# Patient Record
Sex: MALE | Race: WHITE | NOT HISPANIC OR LATINO
[De-identification: names, ages, dates, MRNs, and addresses within clinical notes are randomized per-mention and may not be internally consistent; named-entity substitution may affect disease eponyms.]

---

## 2017-01-09 ENCOUNTER — APPOINTMENT (OUTPATIENT)
Dept: SURGICAL ONCOLOGY | Facility: CLINIC | Age: 57
End: 2017-01-09

## 2017-01-09 VITALS
HEART RATE: 79 BPM | HEIGHT: 72 IN | DIASTOLIC BLOOD PRESSURE: 92 MMHG | WEIGHT: 200 LBS | SYSTOLIC BLOOD PRESSURE: 144 MMHG | BODY MASS INDEX: 27.09 KG/M2 | RESPIRATION RATE: 15 BRPM

## 2017-01-17 ENCOUNTER — APPOINTMENT (OUTPATIENT)
Dept: INTERNAL MEDICINE | Facility: CLINIC | Age: 57
End: 2017-01-17

## 2017-01-17 ENCOUNTER — NON-APPOINTMENT (OUTPATIENT)
Age: 57
End: 2017-01-17

## 2017-01-17 ENCOUNTER — FORM ENCOUNTER (OUTPATIENT)
Age: 57
End: 2017-01-17

## 2017-01-17 VITALS — DIASTOLIC BLOOD PRESSURE: 70 MMHG | SYSTOLIC BLOOD PRESSURE: 118 MMHG

## 2017-01-18 ENCOUNTER — OUTPATIENT (OUTPATIENT)
Dept: OUTPATIENT SERVICES | Facility: HOSPITAL | Age: 57
LOS: 1 days | End: 2017-01-18
Payer: MEDICAID

## 2017-01-18 ENCOUNTER — APPOINTMENT (OUTPATIENT)
Dept: ULTRASOUND IMAGING | Facility: CLINIC | Age: 57
End: 2017-01-18

## 2017-01-18 ENCOUNTER — RESULT REVIEW (OUTPATIENT)
Age: 57
End: 2017-01-18

## 2017-01-18 DIAGNOSIS — R19.00 INTRA-ABDOMINAL AND PELVIC SWELLING, MASS AND LUMP, UNSPECIFIED SITE: ICD-10-CM

## 2017-01-18 LAB
ALBUMIN SERPL ELPH-MCNC: 3.9 G/DL
ALP BLD-CCNC: 75 U/L
ALT SERPL-CCNC: 11 U/L
ANION GAP SERPL CALC-SCNC: 14 MMOL/L
AST SERPL-CCNC: 17 U/L
BASOPHILS # BLD AUTO: 0.04 K/UL
BASOPHILS NFR BLD AUTO: 0.5 %
BILIRUB SERPL-MCNC: 0.3 MG/DL
BUN SERPL-MCNC: 17 MG/DL
CALCIUM SERPL-MCNC: 9.7 MG/DL
CHLORIDE SERPL-SCNC: 101 MMOL/L
CO2 SERPL-SCNC: 26 MMOL/L
CREAT SERPL-MCNC: 1.02 MG/DL
EOSINOPHIL # BLD AUTO: 0.13 K/UL
EOSINOPHIL NFR BLD AUTO: 1.5 %
GLUCOSE SERPL-MCNC: 117 MG/DL
HCT VFR BLD CALC: 43.2 %
HGB BLD-MCNC: 14.3 G/DL
IMM GRANULOCYTES NFR BLD AUTO: 0.2 %
LYMPHOCYTES # BLD AUTO: 2.08 K/UL
LYMPHOCYTES NFR BLD AUTO: 24.2 %
MAN DIFF?: NORMAL
MCHC RBC-ENTMCNC: 31 PG
MCHC RBC-ENTMCNC: 33.1 GM/DL
MCV RBC AUTO: 93.5 FL
MONOCYTES # BLD AUTO: 0.65 K/UL
MONOCYTES NFR BLD AUTO: 7.5 %
NEUTROPHILS # BLD AUTO: 5.69 K/UL
NEUTROPHILS NFR BLD AUTO: 66.1 %
PLATELET # BLD AUTO: 367 K/UL
POTASSIUM SERPL-SCNC: 4.6 MMOL/L
PROT SERPL-MCNC: 6.3 G/DL
RBC # BLD: 4.62 M/UL
RBC # FLD: 12.7 %
SODIUM SERPL-SCNC: 141 MMOL/L
WBC # FLD AUTO: 8.61 K/UL

## 2017-01-18 PROCEDURE — 76705 ECHO EXAM OF ABDOMEN: CPT

## 2017-01-18 PROCEDURE — 76857 US EXAM PELVIC LIMITED: CPT

## 2017-01-23 ENCOUNTER — NON-APPOINTMENT (OUTPATIENT)
Age: 57
End: 2017-01-23

## 2017-02-02 ENCOUNTER — RX RENEWAL (OUTPATIENT)
Age: 57
End: 2017-02-02

## 2017-03-22 ENCOUNTER — APPOINTMENT (OUTPATIENT)
Dept: INTERNAL MEDICINE | Facility: CLINIC | Age: 57
End: 2017-03-22

## 2017-03-22 ENCOUNTER — OUTPATIENT (OUTPATIENT)
Dept: OUTPATIENT SERVICES | Facility: HOSPITAL | Age: 57
LOS: 1 days | End: 2017-03-22
Payer: MEDICAID

## 2017-03-22 VITALS — SYSTOLIC BLOOD PRESSURE: 130 MMHG | DIASTOLIC BLOOD PRESSURE: 80 MMHG

## 2017-03-22 VITALS
DIASTOLIC BLOOD PRESSURE: 97 MMHG | RESPIRATION RATE: 16 BRPM | TEMPERATURE: 98 F | SYSTOLIC BLOOD PRESSURE: 142 MMHG | WEIGHT: 203.05 LBS | HEIGHT: 71.5 IN

## 2017-03-22 DIAGNOSIS — K02.9 DENTAL CARIES, UNSPECIFIED: ICD-10-CM

## 2017-03-22 DIAGNOSIS — K63.5 POLYP OF COLON: ICD-10-CM

## 2017-03-22 DIAGNOSIS — R06.83 SNORING: ICD-10-CM

## 2017-03-22 DIAGNOSIS — N46.9 MALE INFERTILITY, UNSPECIFIED: Chronic | ICD-10-CM

## 2017-03-22 DIAGNOSIS — I10 ESSENTIAL (PRIMARY) HYPERTENSION: ICD-10-CM

## 2017-03-22 DIAGNOSIS — F10.129: ICD-10-CM

## 2017-03-22 DIAGNOSIS — F10.10 ALCOHOL ABUSE, UNCOMPLICATED: ICD-10-CM

## 2017-03-22 DIAGNOSIS — K63.5 POLYP OF COLON: Chronic | ICD-10-CM

## 2017-03-22 LAB
ALBUMIN SERPL ELPH-MCNC: 4.3 G/DL — SIGNIFICANT CHANGE UP (ref 3.3–5)
ALP SERPL-CCNC: 92 U/L — SIGNIFICANT CHANGE UP (ref 40–120)
ALT FLD-CCNC: 13 U/L — SIGNIFICANT CHANGE UP (ref 4–41)
APTT BLD: 32.2 SEC — SIGNIFICANT CHANGE UP (ref 27.5–37.4)
AST SERPL-CCNC: 17 U/L — SIGNIFICANT CHANGE UP (ref 4–40)
BASOPHILS # BLD AUTO: 0.05 K/UL — SIGNIFICANT CHANGE UP (ref 0–0.2)
BASOPHILS NFR BLD AUTO: 0.6 % — SIGNIFICANT CHANGE UP (ref 0–2)
BILIRUB DIRECT SERPL-MCNC: 0.1 MG/DL — SIGNIFICANT CHANGE UP (ref 0.1–0.2)
BILIRUB SERPL-MCNC: 0.5 MG/DL — SIGNIFICANT CHANGE UP (ref 0.2–1.2)
BLD GP AB SCN SERPL QL: NEGATIVE — SIGNIFICANT CHANGE UP
BUN SERPL-MCNC: 14 MG/DL — SIGNIFICANT CHANGE UP (ref 7–23)
CALCIUM SERPL-MCNC: 9.8 MG/DL — SIGNIFICANT CHANGE UP (ref 8.4–10.5)
CHLORIDE SERPL-SCNC: 103 MMOL/L — SIGNIFICANT CHANGE UP (ref 98–107)
CO2 SERPL-SCNC: 26 MMOL/L — SIGNIFICANT CHANGE UP (ref 22–31)
CREAT SERPL-MCNC: 1.14 MG/DL — SIGNIFICANT CHANGE UP (ref 0.5–1.3)
EOSINOPHIL # BLD AUTO: 0.13 K/UL — SIGNIFICANT CHANGE UP (ref 0–0.5)
EOSINOPHIL NFR BLD AUTO: 1.6 % — SIGNIFICANT CHANGE UP (ref 0–6)
GLUCOSE SERPL-MCNC: 102 MG/DL — HIGH (ref 70–99)
HCT VFR BLD CALC: 41.2 % — SIGNIFICANT CHANGE UP (ref 39–50)
HGB BLD-MCNC: 14.2 G/DL — SIGNIFICANT CHANGE UP (ref 13–17)
IMM GRANULOCYTES NFR BLD AUTO: 0.5 % — SIGNIFICANT CHANGE UP (ref 0–1.5)
INR BLD: 0.98 — SIGNIFICANT CHANGE UP (ref 0.88–1.17)
LYMPHOCYTES # BLD AUTO: 2.03 K/UL — SIGNIFICANT CHANGE UP (ref 1–3.3)
LYMPHOCYTES # BLD AUTO: 25 % — SIGNIFICANT CHANGE UP (ref 13–44)
MCHC RBC-ENTMCNC: 30.9 PG — SIGNIFICANT CHANGE UP (ref 27–34)
MCHC RBC-ENTMCNC: 34.5 % — SIGNIFICANT CHANGE UP (ref 32–36)
MCV RBC AUTO: 89.6 FL — SIGNIFICANT CHANGE UP (ref 80–100)
MONOCYTES # BLD AUTO: 0.7 K/UL — SIGNIFICANT CHANGE UP (ref 0–0.9)
MONOCYTES NFR BLD AUTO: 8.6 % — SIGNIFICANT CHANGE UP (ref 2–14)
NEUTROPHILS # BLD AUTO: 5.16 K/UL — SIGNIFICANT CHANGE UP (ref 1.8–7.4)
NEUTROPHILS NFR BLD AUTO: 63.7 % — SIGNIFICANT CHANGE UP (ref 43–77)
PLATELET # BLD AUTO: 311 K/UL — SIGNIFICANT CHANGE UP (ref 150–400)
PMV BLD: 10.6 FL — SIGNIFICANT CHANGE UP (ref 7–13)
POTASSIUM SERPL-MCNC: 4.3 MMOL/L — SIGNIFICANT CHANGE UP (ref 3.5–5.3)
POTASSIUM SERPL-SCNC: 4.3 MMOL/L — SIGNIFICANT CHANGE UP (ref 3.5–5.3)
PROT SERPL-MCNC: 6.7 G/DL — SIGNIFICANT CHANGE UP (ref 6–8.3)
PROTHROM AB SERPL-ACNC: 11 SEC — SIGNIFICANT CHANGE UP (ref 9.8–13.1)
RBC # BLD: 4.6 M/UL — SIGNIFICANT CHANGE UP (ref 4.2–5.8)
RBC # FLD: 13.3 % — SIGNIFICANT CHANGE UP (ref 10.3–14.5)
RH IG SCN BLD-IMP: POSITIVE — SIGNIFICANT CHANGE UP
SODIUM SERPL-SCNC: 141 MMOL/L — SIGNIFICANT CHANGE UP (ref 135–145)
WBC # BLD: 8.11 K/UL — SIGNIFICANT CHANGE UP (ref 3.8–10.5)
WBC # FLD AUTO: 8.11 K/UL — SIGNIFICANT CHANGE UP (ref 3.8–10.5)

## 2017-03-22 PROCEDURE — 93010 ELECTROCARDIOGRAM REPORT: CPT

## 2017-03-22 RX ORDER — SODIUM CHLORIDE 9 MG/ML
1000 INJECTION, SOLUTION INTRAVENOUS
Qty: 0 | Refills: 0 | Status: DISCONTINUED | OUTPATIENT
Start: 2017-03-28 | End: 2017-03-28

## 2017-03-22 NOTE — H&P PST ADULT - ATTENDING COMMENTS
Planned procedure including risks and benefits discussed with patient.      Past Medical History:  Alcohol abuse  relapse and last drink was 3-14-17. Had been sober for 168 days prior to drink on 3-14-17  Colon polyp  multiple  Hypertension  "labile" -- not on medication as of 30-22-17  Snoring  ANSON precautions -- responds affirmatively to STOP BANG questionnaire -- admits to loud snoring; age > 50; gender, male; elevated BP at PAST office.    Past Surgical History:  Colon polyp  colonoscopy -- negative biopsies in 2017  Infertility, male, severe  vascular procedure for infertility.

## 2017-03-22 NOTE — H&P PST ADULT - PROBLEM SELECTOR PLAN 1
This is a 57 y/o male who is scheduled for colonoscopy, lap robotic assisted extended right colectomy possible open on 3-28-17  * Given scrub cleanser This is a 55 y/o male who is scheduled for colonoscopy, lap robotic assisted extended right colectomy possible open on 3-28-17  * Given scrub cleanser  * Instructed to take normal am dose of disulfiram the am of surgery

## 2017-03-22 NOTE — H&P PST ADULT - LYMPHATIC
anterior cervical R/supraclavicular R/supraclavicular L/posterior cervical R/anterior cervical L/posterior cervical L

## 2017-03-22 NOTE — H&P PST ADULT - NSANTHOSAYNRD_GEN_A_CORE
No. ANSON screening performed.  STOP BANG Legend: 0-2 = LOW Risk; 3-4 = INTERMEDIATE Risk; 5-8 = HIGH Risk

## 2017-03-22 NOTE — H&P PST ADULT - PMH
Alcohol abuse  relapse and last drink was 3-14-17. Had been sober for 168 days prior to drink on 3-14-17  Colon polyp  multiple  Hypertension  "labile" -- not on medication as of 30-22-17  Snoring  ANSON precautions -- responds affirmatively to STOP BANG questionnaire -- admits to loud snoring; age > 50; gender, male; elevated BP at PAST office

## 2017-03-22 NOTE — H&P PST ADULT - HISTORY OF PRESENT ILLNESS
This is a 57 y/o male who presents with recent routine colonoscopy which revealed multiple polyps .Biopsies negative. Due to vast number, surgical intervention recommended. Scheduled for lap robotic assisted extended right colectomy, poss open on 3-28-17

## 2017-03-22 NOTE — H&P PST ADULT - PROBLEM SELECTOR PLAN 4
Await medical clearance from pcp due to elevated BP at PAST office  * Await old ekg for comparison  * Need to notify surgeon of pre op medical clearance Await medical clearance from pcp due to elevated BP at PAST office  * Await old ekg for comparison  * Need to notify surgeon of pre op medical clearance--notified Naomi at surgeon's service [phone]

## 2017-03-22 NOTE — H&P PST ADULT - PSH
Colon polyp  colonoscopy -- negative biopsies in 2017  Infertility, male, severe  vascular procedure for infertility

## 2017-03-22 NOTE — H&P PST ADULT - PROBLEM SELECTOR PLAN 5
Await dental clearance due to 4 loose bottom teeth  * Need to notify surgeon of pre op dental clearance Await dental clearance due to 4 loose bottom teeth  * Need to notify surgeon of pre op dental clearance--notified Naomi at surgeon's service [phone]

## 2017-03-28 ENCOUNTER — APPOINTMENT (OUTPATIENT)
Dept: SURGICAL ONCOLOGY | Facility: HOSPITAL | Age: 57
End: 2017-03-28

## 2017-03-28 ENCOUNTER — INPATIENT (INPATIENT)
Facility: HOSPITAL | Age: 57
LOS: 0 days | Discharge: HOME | End: 2017-03-28
Attending: SPECIALIST | Admitting: SPECIALIST

## 2017-03-28 VITALS
OXYGEN SATURATION: 97 % | HEIGHT: 71 IN | SYSTOLIC BLOOD PRESSURE: 126 MMHG | DIASTOLIC BLOOD PRESSURE: 91 MMHG | HEART RATE: 75 BPM | RESPIRATION RATE: 15 BRPM | WEIGHT: 203.05 LBS | TEMPERATURE: 98 F

## 2017-03-28 DIAGNOSIS — K63.5 POLYP OF COLON: Chronic | ICD-10-CM

## 2017-03-28 DIAGNOSIS — K63.5 POLYP OF COLON: ICD-10-CM

## 2017-03-28 DIAGNOSIS — N46.9 MALE INFERTILITY, UNSPECIFIED: Chronic | ICD-10-CM

## 2017-03-28 LAB — RH IG SCN BLD-IMP: POSITIVE — SIGNIFICANT CHANGE UP

## 2017-03-28 RX ORDER — HEPARIN SODIUM 5000 [USP'U]/ML
5000 INJECTION INTRAVENOUS; SUBCUTANEOUS ONCE
Qty: 0 | Refills: 0 | Status: COMPLETED | OUTPATIENT
Start: 2017-03-28 | End: 2017-03-28

## 2017-03-28 RX ADMIN — HEPARIN SODIUM 5000 UNIT(S): 5000 INJECTION INTRAVENOUS; SUBCUTANEOUS at 12:14

## 2017-03-28 NOTE — ASU PREOP CHECKLIST - TO WHOM
surgery cancelled by Dr. Cooley at 12:35 pm surgery cancelled by Dr. Cooley at 12:35 pm due to a family emergency

## 2017-04-06 ENCOUNTER — RESULT REVIEW (OUTPATIENT)
Age: 57
End: 2017-04-06

## 2017-04-07 ENCOUNTER — INPATIENT (INPATIENT)
Facility: HOSPITAL | Age: 57
LOS: 6 days | Discharge: HOME | DRG: 330 | End: 2017-04-14
Attending: SPECIALIST | Admitting: SPECIALIST
Payer: MEDICAID

## 2017-04-07 ENCOUNTER — APPOINTMENT (OUTPATIENT)
Dept: SURGICAL ONCOLOGY | Facility: HOSPITAL | Age: 57
End: 2017-04-07

## 2017-04-07 VITALS
OXYGEN SATURATION: 96 % | TEMPERATURE: 100 F | WEIGHT: 203.05 LBS | HEIGHT: 72 IN | HEART RATE: 94 BPM | SYSTOLIC BLOOD PRESSURE: 147 MMHG | DIASTOLIC BLOOD PRESSURE: 98 MMHG | RESPIRATION RATE: 18 BRPM

## 2017-04-07 DIAGNOSIS — K63.5 POLYP OF COLON: ICD-10-CM

## 2017-04-07 DIAGNOSIS — N46.9 MALE INFERTILITY, UNSPECIFIED: Chronic | ICD-10-CM

## 2017-04-07 DIAGNOSIS — K63.5 POLYP OF COLON: Chronic | ICD-10-CM

## 2017-04-07 LAB
ANION GAP SERPL CALC-SCNC: 13 MMOL/L — SIGNIFICANT CHANGE UP (ref 5–17)
BLD GP AB SCN SERPL QL: NEGATIVE — SIGNIFICANT CHANGE UP
BUN SERPL-MCNC: 8 MG/DL — SIGNIFICANT CHANGE UP (ref 7–23)
CALCIUM SERPL-MCNC: 8.3 MG/DL — LOW (ref 8.4–10.5)
CHLORIDE SERPL-SCNC: 103 MMOL/L — SIGNIFICANT CHANGE UP (ref 96–108)
CO2 SERPL-SCNC: 22 MMOL/L — SIGNIFICANT CHANGE UP (ref 22–31)
CREAT SERPL-MCNC: 1.36 MG/DL — HIGH (ref 0.5–1.3)
GLUCOSE SERPL-MCNC: 154 MG/DL — HIGH (ref 70–99)
HCT VFR BLD CALC: 41 % — SIGNIFICANT CHANGE UP (ref 39–50)
HGB BLD-MCNC: 13.8 G/DL — SIGNIFICANT CHANGE UP (ref 13–17)
MCHC RBC-ENTMCNC: 31.8 PG — SIGNIFICANT CHANGE UP (ref 27–34)
MCHC RBC-ENTMCNC: 33.7 GM/DL — SIGNIFICANT CHANGE UP (ref 32–36)
MCV RBC AUTO: 94.5 FL — SIGNIFICANT CHANGE UP (ref 80–100)
PLATELET # BLD AUTO: 288 K/UL — SIGNIFICANT CHANGE UP (ref 150–400)
POTASSIUM SERPL-MCNC: 4.3 MMOL/L — SIGNIFICANT CHANGE UP (ref 3.5–5.3)
POTASSIUM SERPL-SCNC: 4.3 MMOL/L — SIGNIFICANT CHANGE UP (ref 3.5–5.3)
RBC # BLD: 4.34 M/UL — SIGNIFICANT CHANGE UP (ref 4.2–5.8)
RBC # FLD: 12.4 % — SIGNIFICANT CHANGE UP (ref 10.3–14.5)
RH IG SCN BLD-IMP: POSITIVE — SIGNIFICANT CHANGE UP
RH IG SCN BLD-IMP: POSITIVE — SIGNIFICANT CHANGE UP
SODIUM SERPL-SCNC: 138 MMOL/L — SIGNIFICANT CHANGE UP (ref 135–145)
WBC # BLD: 13 K/UL — HIGH (ref 3.8–10.5)
WBC # FLD AUTO: 13 K/UL — HIGH (ref 3.8–10.5)

## 2017-04-07 PROCEDURE — 93010 ELECTROCARDIOGRAM REPORT: CPT

## 2017-04-07 PROCEDURE — 45381 COLONOSCOPY SUBMUCOUS NJX: CPT

## 2017-04-07 PROCEDURE — 44205 LAP COLECTOMY PART W/ILEUM: CPT

## 2017-04-07 PROCEDURE — 38570 LAPAROSCOPY LYMPH NODE BIOP: CPT

## 2017-04-07 RX ORDER — HEPARIN SODIUM 5000 [USP'U]/ML
5000 INJECTION INTRAVENOUS; SUBCUTANEOUS EVERY 8 HOURS
Qty: 0 | Refills: 0 | Status: DISCONTINUED | OUTPATIENT
Start: 2017-04-07 | End: 2017-04-07

## 2017-04-07 RX ORDER — NALOXONE HYDROCHLORIDE 4 MG/.1ML
0.1 SPRAY NASAL
Qty: 0 | Refills: 0 | Status: DISCONTINUED | OUTPATIENT
Start: 2017-04-07 | End: 2017-04-09

## 2017-04-07 RX ORDER — ACETAMINOPHEN 500 MG
1000 TABLET ORAL EVERY 6 HOURS
Qty: 0 | Refills: 0 | Status: COMPLETED | OUTPATIENT
Start: 2017-04-08 | End: 2017-04-08

## 2017-04-07 RX ORDER — HEPARIN SODIUM 5000 [USP'U]/ML
5000 INJECTION INTRAVENOUS; SUBCUTANEOUS ONCE
Qty: 0 | Refills: 0 | Status: COMPLETED | OUTPATIENT
Start: 2017-04-07 | End: 2017-04-07

## 2017-04-07 RX ORDER — METOPROLOL TARTRATE 50 MG
5 TABLET ORAL ONCE
Qty: 0 | Refills: 0 | Status: COMPLETED | OUTPATIENT
Start: 2017-04-07 | End: 2017-04-07

## 2017-04-07 RX ORDER — ENOXAPARIN SODIUM 100 MG/ML
40 INJECTION SUBCUTANEOUS DAILY
Qty: 0 | Refills: 0 | Status: DISCONTINUED | OUTPATIENT
Start: 2017-04-07 | End: 2017-04-14

## 2017-04-07 RX ORDER — ONDANSETRON 8 MG/1
4 TABLET, FILM COATED ORAL EVERY 6 HOURS
Qty: 0 | Refills: 0 | Status: DISCONTINUED | OUTPATIENT
Start: 2017-04-07 | End: 2017-04-09

## 2017-04-07 RX ORDER — HYDROMORPHONE HYDROCHLORIDE 2 MG/ML
30 INJECTION INTRAMUSCULAR; INTRAVENOUS; SUBCUTANEOUS
Qty: 0 | Refills: 0 | Status: DISCONTINUED | OUTPATIENT
Start: 2017-04-07 | End: 2017-04-09

## 2017-04-07 RX ORDER — LABETALOL HCL 100 MG
10 TABLET ORAL ONCE
Qty: 0 | Refills: 0 | Status: COMPLETED | OUTPATIENT
Start: 2017-04-07 | End: 2017-04-07

## 2017-04-07 RX ORDER — ALVIMOPAN 12 MG/1
12 CAPSULE ORAL ONCE
Qty: 0 | Refills: 0 | Status: COMPLETED | OUTPATIENT
Start: 2017-04-07 | End: 2017-04-07

## 2017-04-07 RX ORDER — SODIUM CHLORIDE 9 MG/ML
1000 INJECTION, SOLUTION INTRAVENOUS
Qty: 0 | Refills: 0 | Status: DISCONTINUED | OUTPATIENT
Start: 2017-04-07 | End: 2017-04-09

## 2017-04-07 RX ORDER — METOPROLOL TARTRATE 50 MG
5 TABLET ORAL EVERY 8 HOURS
Qty: 0 | Refills: 0 | Status: COMPLETED | OUTPATIENT
Start: 2017-04-07 | End: 2017-04-07

## 2017-04-07 RX ORDER — ONDANSETRON 8 MG/1
4 TABLET, FILM COATED ORAL
Qty: 0 | Refills: 0 | Status: DISCONTINUED | OUTPATIENT
Start: 2017-04-07 | End: 2017-04-07

## 2017-04-07 RX ORDER — HYDROMORPHONE HYDROCHLORIDE 2 MG/ML
1 INJECTION INTRAMUSCULAR; INTRAVENOUS; SUBCUTANEOUS
Qty: 0 | Refills: 0 | Status: DISCONTINUED | OUTPATIENT
Start: 2017-04-07 | End: 2017-04-07

## 2017-04-07 RX ORDER — ACETAMINOPHEN 500 MG
1000 TABLET ORAL ONCE
Qty: 0 | Refills: 0 | Status: COMPLETED | OUTPATIENT
Start: 2017-04-07 | End: 2017-04-07

## 2017-04-07 RX ORDER — ACETAMINOPHEN 500 MG
1000 TABLET ORAL EVERY 6 HOURS
Qty: 0 | Refills: 0 | Status: COMPLETED | OUTPATIENT
Start: 2017-04-07 | End: 2017-04-08

## 2017-04-07 RX ORDER — HYDROMORPHONE HYDROCHLORIDE 2 MG/ML
0.5 INJECTION INTRAMUSCULAR; INTRAVENOUS; SUBCUTANEOUS
Qty: 0 | Refills: 0 | Status: DISCONTINUED | OUTPATIENT
Start: 2017-04-07 | End: 2017-04-09

## 2017-04-07 RX ORDER — CEFOTETAN DISODIUM 1 G
2 VIAL (EA) INJECTION ONCE
Qty: 0 | Refills: 0 | Status: DISCONTINUED | OUTPATIENT
Start: 2017-04-07 | End: 2017-04-07

## 2017-04-07 RX ORDER — ALVIMOPAN 12 MG/1
12 CAPSULE ORAL
Qty: 0 | Refills: 0 | Status: DISCONTINUED | OUTPATIENT
Start: 2017-04-07 | End: 2017-04-10

## 2017-04-07 RX ADMIN — ALVIMOPAN 12 MILLIGRAM(S): 12 CAPSULE ORAL at 18:16

## 2017-04-07 RX ADMIN — HYDROMORPHONE HYDROCHLORIDE 1 MILLIGRAM(S): 2 INJECTION INTRAMUSCULAR; INTRAVENOUS; SUBCUTANEOUS at 13:40

## 2017-04-07 RX ADMIN — HYDROMORPHONE HYDROCHLORIDE 1 MILLIGRAM(S): 2 INJECTION INTRAMUSCULAR; INTRAVENOUS; SUBCUTANEOUS at 15:59

## 2017-04-07 RX ADMIN — Medication 400 MILLIGRAM(S): at 18:54

## 2017-04-07 RX ADMIN — SODIUM CHLORIDE 125 MILLILITER(S): 9 INJECTION, SOLUTION INTRAVENOUS at 15:34

## 2017-04-07 RX ADMIN — HYDROMORPHONE HYDROCHLORIDE 1 MILLIGRAM(S): 2 INJECTION INTRAMUSCULAR; INTRAVENOUS; SUBCUTANEOUS at 15:34

## 2017-04-07 RX ADMIN — Medication 110 MILLIGRAM(S): at 15:59

## 2017-04-07 RX ADMIN — HYDROMORPHONE HYDROCHLORIDE 1 MILLIGRAM(S): 2 INJECTION INTRAMUSCULAR; INTRAVENOUS; SUBCUTANEOUS at 14:00

## 2017-04-07 RX ADMIN — HYDROMORPHONE HYDROCHLORIDE 1 MILLIGRAM(S): 2 INJECTION INTRAMUSCULAR; INTRAVENOUS; SUBCUTANEOUS at 14:20

## 2017-04-07 RX ADMIN — ALVIMOPAN 12 MILLIGRAM(S): 12 CAPSULE ORAL at 08:30

## 2017-04-07 RX ADMIN — Medication 5 MILLIGRAM(S): at 18:04

## 2017-04-07 RX ADMIN — HYDROMORPHONE HYDROCHLORIDE 30 MILLILITER(S): 2 INJECTION INTRAMUSCULAR; INTRAVENOUS; SUBCUTANEOUS at 16:21

## 2017-04-07 RX ADMIN — HEPARIN SODIUM 5000 UNIT(S): 5000 INJECTION INTRAVENOUS; SUBCUTANEOUS at 08:30

## 2017-04-07 RX ADMIN — HYDROMORPHONE HYDROCHLORIDE 1 MILLIGRAM(S): 2 INJECTION INTRAMUSCULAR; INTRAVENOUS; SUBCUTANEOUS at 14:25

## 2017-04-07 RX ADMIN — HYDROMORPHONE HYDROCHLORIDE 0.5 MILLIGRAM(S): 2 INJECTION INTRAMUSCULAR; INTRAVENOUS; SUBCUTANEOUS at 17:16

## 2017-04-07 RX ADMIN — Medication 10 MILLIGRAM(S): at 15:00

## 2017-04-07 NOTE — H&P ADULT. - ASSESSMENT
55y/o male for robotic Right hemicolectomy for colonic dysplasia    - Patient for OR today; PST done previously at [facility], now scheduled for [facility]

## 2017-04-07 NOTE — H&P ADULT. - HISTORY OF PRESENT ILLNESS
This is a 57 y/o male who presents with recent routine colonoscopy which revealed multiple polyps .Biopsies negative. Due to vast number, surgical intervention recommended. Scheduled for lap robotic assisted extended right colectomy, poss open on 3-28-17. Patient initially scheduled at [facility]; rescheduled for surgery at [facility].

## 2017-04-07 NOTE — BRIEF OPERATIVE NOTE - OPERATION/FINDINGS
Robotic Extended Right tracie-colectomy for colonic dysplasia    Robot docked; cecum & Right ureter identified. Right colon mobilized from cecum to mid-transverse colon. Appropriate proximal & distal margins for resection identified, mesocolon then divided with vessel sealer. Intra-corporeal robotic resection & ileo-colonic hand sewn anastomosis performed. The robot was then undocked & specimen was removed through a small extended midline incision. Port sites closed; midline wound closed with staples & yon left in place. Patient brought to PACU extubated with bloom in place

## 2017-04-08 LAB
ANION GAP SERPL CALC-SCNC: 12 MMOL/L — SIGNIFICANT CHANGE UP (ref 5–17)
ANION GAP SERPL CALC-SCNC: 12 MMOL/L — SIGNIFICANT CHANGE UP (ref 5–17)
BASOPHILS # BLD AUTO: 0.01 K/UL — SIGNIFICANT CHANGE UP (ref 0–0.2)
BASOPHILS NFR BLD AUTO: 0.2 % — SIGNIFICANT CHANGE UP (ref 0–2)
BUN SERPL-MCNC: 9 MG/DL — SIGNIFICANT CHANGE UP (ref 7–23)
BUN SERPL-MCNC: 9 MG/DL — SIGNIFICANT CHANGE UP (ref 7–23)
CALCIUM SERPL-MCNC: 8.4 MG/DL — SIGNIFICANT CHANGE UP (ref 8.4–10.5)
CALCIUM SERPL-MCNC: 8.5 MG/DL — SIGNIFICANT CHANGE UP (ref 8.4–10.5)
CHLORIDE SERPL-SCNC: 96 MMOL/L — SIGNIFICANT CHANGE UP (ref 96–108)
CHLORIDE SERPL-SCNC: 97 MMOL/L — SIGNIFICANT CHANGE UP (ref 96–108)
CO2 SERPL-SCNC: 24 MMOL/L — SIGNIFICANT CHANGE UP (ref 22–31)
CO2 SERPL-SCNC: 24 MMOL/L — SIGNIFICANT CHANGE UP (ref 22–31)
CREAT SERPL-MCNC: 1.11 MG/DL — SIGNIFICANT CHANGE UP (ref 0.5–1.3)
CREAT SERPL-MCNC: 1.14 MG/DL — SIGNIFICANT CHANGE UP (ref 0.5–1.3)
EOSINOPHIL # BLD AUTO: 0 K/UL — SIGNIFICANT CHANGE UP (ref 0–0.5)
EOSINOPHIL NFR BLD AUTO: 0 % — SIGNIFICANT CHANGE UP (ref 0–6)
GAS PNL BLDA: SIGNIFICANT CHANGE UP
GLUCOSE SERPL-MCNC: 116 MG/DL — HIGH (ref 70–99)
GLUCOSE SERPL-MCNC: 116 MG/DL — HIGH (ref 70–99)
HCT VFR BLD CALC: 38.3 % — LOW (ref 39–50)
HGB BLD-MCNC: 13 G/DL — SIGNIFICANT CHANGE UP (ref 13–17)
IMM GRANULOCYTES NFR BLD AUTO: 0.2 % — SIGNIFICANT CHANGE UP (ref 0–1.5)
LYMPHOCYTES # BLD AUTO: 0.76 K/UL — LOW (ref 1–3.3)
LYMPHOCYTES # BLD AUTO: 12 % — LOW (ref 13–44)
MAGNESIUM SERPL-MCNC: 1.7 MG/DL — SIGNIFICANT CHANGE UP (ref 1.6–2.6)
MCHC RBC-ENTMCNC: 31.3 PG — SIGNIFICANT CHANGE UP (ref 27–34)
MCHC RBC-ENTMCNC: 33.9 GM/DL — SIGNIFICANT CHANGE UP (ref 32–36)
MCV RBC AUTO: 92.1 FL — SIGNIFICANT CHANGE UP (ref 80–100)
MONOCYTES # BLD AUTO: 0.4 K/UL — SIGNIFICANT CHANGE UP (ref 0–0.9)
MONOCYTES NFR BLD AUTO: 6.3 % — SIGNIFICANT CHANGE UP (ref 2–14)
NEUTROPHILS # BLD AUTO: 5.14 K/UL — SIGNIFICANT CHANGE UP (ref 1.8–7.4)
NEUTROPHILS NFR BLD AUTO: 81.3 % — HIGH (ref 43–77)
PHOSPHATE SERPL-MCNC: 3.8 MG/DL — SIGNIFICANT CHANGE UP (ref 2.5–4.5)
PLATELET # BLD AUTO: 266 K/UL — SIGNIFICANT CHANGE UP (ref 150–400)
POTASSIUM SERPL-MCNC: 4.4 MMOL/L — SIGNIFICANT CHANGE UP (ref 3.5–5.3)
POTASSIUM SERPL-MCNC: 4.4 MMOL/L — SIGNIFICANT CHANGE UP (ref 3.5–5.3)
POTASSIUM SERPL-SCNC: 4.4 MMOL/L — SIGNIFICANT CHANGE UP (ref 3.5–5.3)
POTASSIUM SERPL-SCNC: 4.4 MMOL/L — SIGNIFICANT CHANGE UP (ref 3.5–5.3)
RBC # BLD: 4.16 M/UL — LOW (ref 4.2–5.8)
RBC # FLD: 14 % — SIGNIFICANT CHANGE UP (ref 10.3–14.5)
SODIUM SERPL-SCNC: 132 MMOL/L — LOW (ref 135–145)
SODIUM SERPL-SCNC: 133 MMOL/L — LOW (ref 135–145)
WBC # BLD: 6.32 K/UL — SIGNIFICANT CHANGE UP (ref 3.8–10.5)
WBC # FLD AUTO: 6.32 K/UL — SIGNIFICANT CHANGE UP (ref 3.8–10.5)

## 2017-04-08 PROCEDURE — 93010 ELECTROCARDIOGRAM REPORT: CPT

## 2017-04-08 PROCEDURE — 99291 CRITICAL CARE FIRST HOUR: CPT

## 2017-04-08 RX ORDER — METOPROLOL TARTRATE 50 MG
5 TABLET ORAL ONCE
Qty: 0 | Refills: 0 | Status: COMPLETED | OUTPATIENT
Start: 2017-04-08 | End: 2017-04-08

## 2017-04-08 RX ORDER — ACETAMINOPHEN 500 MG
1000 TABLET ORAL ONCE
Qty: 0 | Refills: 0 | Status: COMPLETED | OUTPATIENT
Start: 2017-04-08 | End: 2017-04-09

## 2017-04-08 RX ORDER — MAGNESIUM SULFATE 500 MG/ML
2 VIAL (ML) INJECTION ONCE
Qty: 0 | Refills: 0 | Status: COMPLETED | OUTPATIENT
Start: 2017-04-08 | End: 2017-04-09

## 2017-04-08 RX ADMIN — ENOXAPARIN SODIUM 40 MILLIGRAM(S): 100 INJECTION SUBCUTANEOUS at 12:07

## 2017-04-08 RX ADMIN — Medication 2 MILLIGRAM(S): at 22:33

## 2017-04-08 RX ADMIN — Medication 400 MILLIGRAM(S): at 12:07

## 2017-04-08 RX ADMIN — ALVIMOPAN 12 MILLIGRAM(S): 12 CAPSULE ORAL at 06:40

## 2017-04-08 RX ADMIN — HYDROMORPHONE HYDROCHLORIDE 30 MILLILITER(S): 2 INJECTION INTRAMUSCULAR; INTRAVENOUS; SUBCUTANEOUS at 07:49

## 2017-04-08 RX ADMIN — Medication 400 MILLIGRAM(S): at 00:21

## 2017-04-08 RX ADMIN — ALVIMOPAN 12 MILLIGRAM(S): 12 CAPSULE ORAL at 18:39

## 2017-04-08 RX ADMIN — Medication 400 MILLIGRAM(S): at 06:39

## 2017-04-08 RX ADMIN — Medication 5 MILLIGRAM(S): at 00:21

## 2017-04-09 LAB
ALBUMIN SERPL ELPH-MCNC: 3.6 G/DL — SIGNIFICANT CHANGE UP (ref 3.3–5)
ALP SERPL-CCNC: 74 U/L — SIGNIFICANT CHANGE UP (ref 40–120)
ALT FLD-CCNC: 15 U/L RC — SIGNIFICANT CHANGE UP (ref 10–45)
ANION GAP SERPL CALC-SCNC: 13 MMOL/L — SIGNIFICANT CHANGE UP (ref 5–17)
ANION GAP SERPL CALC-SCNC: 15 MMOL/L — SIGNIFICANT CHANGE UP (ref 5–17)
ANION GAP SERPL CALC-SCNC: 15 MMOL/L — SIGNIFICANT CHANGE UP (ref 5–17)
APPEARANCE UR: CLEAR — SIGNIFICANT CHANGE UP
AST SERPL-CCNC: 18 U/L — SIGNIFICANT CHANGE UP (ref 10–40)
BASOPHILS # BLD AUTO: 0 K/UL — SIGNIFICANT CHANGE UP (ref 0–0.2)
BASOPHILS NFR BLD AUTO: 0.2 % — SIGNIFICANT CHANGE UP (ref 0–2)
BILIRUB DIRECT SERPL-MCNC: 0.1 MG/DL — SIGNIFICANT CHANGE UP (ref 0–0.2)
BILIRUB INDIRECT FLD-MCNC: 0.3 MG/DL — SIGNIFICANT CHANGE UP (ref 0.2–1)
BILIRUB SERPL-MCNC: 0.4 MG/DL — SIGNIFICANT CHANGE UP (ref 0.2–1.2)
BILIRUB UR-MCNC: NEGATIVE — SIGNIFICANT CHANGE UP
BUN SERPL-MCNC: 8 MG/DL — SIGNIFICANT CHANGE UP (ref 7–23)
BUN SERPL-MCNC: 8 MG/DL — SIGNIFICANT CHANGE UP (ref 7–23)
BUN SERPL-MCNC: 9 MG/DL — SIGNIFICANT CHANGE UP (ref 7–23)
CALCIUM SERPL-MCNC: 8.3 MG/DL — LOW (ref 8.4–10.5)
CALCIUM SERPL-MCNC: 8.4 MG/DL — SIGNIFICANT CHANGE UP (ref 8.4–10.5)
CALCIUM SERPL-MCNC: 8.6 MG/DL — SIGNIFICANT CHANGE UP (ref 8.4–10.5)
CHLORIDE SERPL-SCNC: 89 MMOL/L — LOW (ref 96–108)
CHLORIDE SERPL-SCNC: 92 MMOL/L — LOW (ref 96–108)
CHLORIDE SERPL-SCNC: 96 MMOL/L — SIGNIFICANT CHANGE UP (ref 96–108)
CK MB CFR SERPL CALC: 1 NG/ML — SIGNIFICANT CHANGE UP (ref 0–6.7)
CK MB CFR SERPL CALC: 1 NG/ML — SIGNIFICANT CHANGE UP (ref 0–6.7)
CO2 SERPL-SCNC: 25 MMOL/L — SIGNIFICANT CHANGE UP (ref 22–31)
CO2 SERPL-SCNC: 26 MMOL/L — SIGNIFICANT CHANGE UP (ref 22–31)
CO2 SERPL-SCNC: 26 MMOL/L — SIGNIFICANT CHANGE UP (ref 22–31)
COLOR SPEC: SIGNIFICANT CHANGE UP
CREAT ?TM UR-MCNC: 13 MG/DL — SIGNIFICANT CHANGE UP
CREAT ?TM UR-MCNC: 48 MG/DL — SIGNIFICANT CHANGE UP
CREAT SERPL-MCNC: 0.91 MG/DL — SIGNIFICANT CHANGE UP (ref 0.5–1.3)
CREAT SERPL-MCNC: 1.02 MG/DL — SIGNIFICANT CHANGE UP (ref 0.5–1.3)
CREAT SERPL-MCNC: 1.11 MG/DL — SIGNIFICANT CHANGE UP (ref 0.5–1.3)
DIFF PNL FLD: ABNORMAL
EOSINOPHIL # BLD AUTO: 0 K/UL — SIGNIFICANT CHANGE UP (ref 0–0.5)
EOSINOPHIL NFR BLD AUTO: 0.3 % — SIGNIFICANT CHANGE UP (ref 0–6)
GAS PNL BLDA: SIGNIFICANT CHANGE UP
GLUCOSE SERPL-MCNC: 118 MG/DL — HIGH (ref 70–99)
GLUCOSE SERPL-MCNC: 120 MG/DL — HIGH (ref 70–99)
GLUCOSE SERPL-MCNC: 92 MG/DL — SIGNIFICANT CHANGE UP (ref 70–99)
GLUCOSE UR QL: NEGATIVE — SIGNIFICANT CHANGE UP
HCT VFR BLD CALC: 39.5 % — SIGNIFICANT CHANGE UP (ref 39–50)
HCT VFR BLD CALC: 40.3 % — SIGNIFICANT CHANGE UP (ref 39–50)
HGB BLD-MCNC: 13.6 G/DL — SIGNIFICANT CHANGE UP (ref 13–17)
HGB BLD-MCNC: 13.7 G/DL — SIGNIFICANT CHANGE UP (ref 13–17)
KETONES UR-MCNC: ABNORMAL
LACTATE SERPL-SCNC: 1.1 MMOL/L — SIGNIFICANT CHANGE UP (ref 0.7–2)
LEUKOCYTE ESTERASE UR-ACNC: NEGATIVE — SIGNIFICANT CHANGE UP
LYMPHOCYTES # BLD AUTO: 0.6 K/UL — LOW (ref 1–3.3)
LYMPHOCYTES # BLD AUTO: 6.8 % — LOW (ref 13–44)
MAGNESIUM SERPL-MCNC: 1.5 MG/DL — LOW (ref 1.6–2.6)
MAGNESIUM SERPL-MCNC: 2.4 MG/DL — SIGNIFICANT CHANGE UP (ref 1.6–2.6)
MAGNESIUM SERPL-MCNC: 2.7 MG/DL — HIGH (ref 1.6–2.6)
MCHC RBC-ENTMCNC: 31.1 PG — SIGNIFICANT CHANGE UP (ref 27–34)
MCHC RBC-ENTMCNC: 32.2 PG — SIGNIFICANT CHANGE UP (ref 27–34)
MCHC RBC-ENTMCNC: 33.7 GM/DL — SIGNIFICANT CHANGE UP (ref 32–36)
MCHC RBC-ENTMCNC: 34.8 GM/DL — SIGNIFICANT CHANGE UP (ref 32–36)
MCV RBC AUTO: 92.3 FL — SIGNIFICANT CHANGE UP (ref 80–100)
MCV RBC AUTO: 92.6 FL — SIGNIFICANT CHANGE UP (ref 80–100)
MONOCYTES # BLD AUTO: 0.4 K/UL — SIGNIFICANT CHANGE UP (ref 0–0.9)
MONOCYTES NFR BLD AUTO: 5 % — SIGNIFICANT CHANGE UP (ref 2–14)
NEUTROPHILS # BLD AUTO: 7.5 K/UL — HIGH (ref 1.8–7.4)
NEUTROPHILS NFR BLD AUTO: 87.7 % — HIGH (ref 43–77)
NITRITE UR-MCNC: NEGATIVE — SIGNIFICANT CHANGE UP
OSMOLALITY SERPL: 269 MOS/KG — LOW (ref 275–300)
OSMOLALITY SERPL: 282 MOS/KG — SIGNIFICANT CHANGE UP (ref 275–300)
OSMOLALITY UR: 285 MOS/KG — LOW (ref 300–900)
OSMOLALITY UR: 72 MOS/KG — LOW (ref 300–900)
PH UR: 6.5 — SIGNIFICANT CHANGE UP (ref 4.8–8)
PHOSPHATE SERPL-MCNC: 1.9 MG/DL — LOW (ref 2.5–4.5)
PHOSPHATE SERPL-MCNC: 2.9 MG/DL — SIGNIFICANT CHANGE UP (ref 2.5–4.5)
PHOSPHATE SERPL-MCNC: 4 MG/DL — SIGNIFICANT CHANGE UP (ref 2.5–4.5)
PLATELET # BLD AUTO: 205 K/UL — SIGNIFICANT CHANGE UP (ref 150–400)
PLATELET # BLD AUTO: 222 K/UL — SIGNIFICANT CHANGE UP (ref 150–400)
POTASSIUM SERPL-MCNC: 3.8 MMOL/L — SIGNIFICANT CHANGE UP (ref 3.5–5.3)
POTASSIUM SERPL-MCNC: 4 MMOL/L — SIGNIFICANT CHANGE UP (ref 3.5–5.3)
POTASSIUM SERPL-MCNC: 4.3 MMOL/L — SIGNIFICANT CHANGE UP (ref 3.5–5.3)
POTASSIUM SERPL-SCNC: 3.8 MMOL/L — SIGNIFICANT CHANGE UP (ref 3.5–5.3)
POTASSIUM SERPL-SCNC: 4 MMOL/L — SIGNIFICANT CHANGE UP (ref 3.5–5.3)
POTASSIUM SERPL-SCNC: 4.3 MMOL/L — SIGNIFICANT CHANGE UP (ref 3.5–5.3)
PROT SERPL-MCNC: 6.1 G/DL — SIGNIFICANT CHANGE UP (ref 6–8.3)
PROT UR-MCNC: 30 MG/DL
RBC # BLD: 4.27 M/UL — SIGNIFICANT CHANGE UP (ref 4.2–5.8)
RBC # BLD: 4.37 M/UL — SIGNIFICANT CHANGE UP (ref 4.2–5.8)
RBC # FLD: 12.1 % — SIGNIFICANT CHANGE UP (ref 10.3–14.5)
RBC # FLD: 12.4 % — SIGNIFICANT CHANGE UP (ref 10.3–14.5)
SODIUM SERPL-SCNC: 129 MMOL/L — LOW (ref 135–145)
SODIUM SERPL-SCNC: 133 MMOL/L — LOW (ref 135–145)
SODIUM SERPL-SCNC: 135 MMOL/L — SIGNIFICANT CHANGE UP (ref 135–145)
SODIUM UR-SCNC: 65 MMOL/L — SIGNIFICANT CHANGE UP
SODIUM UR-SCNC: <20 MMOL/L — SIGNIFICANT CHANGE UP
SP GR SPEC: 1.02 — SIGNIFICANT CHANGE UP (ref 1.01–1.02)
TROPONIN T SERPL-MCNC: <0.01 NG/ML — SIGNIFICANT CHANGE UP (ref 0–0.06)
TROPONIN T SERPL-MCNC: <0.01 NG/ML — SIGNIFICANT CHANGE UP (ref 0–0.06)
UROBILINOGEN FLD QL: NEGATIVE — SIGNIFICANT CHANGE UP
WBC # BLD: 6.6 K/UL — SIGNIFICANT CHANGE UP (ref 3.8–10.5)
WBC # BLD: 8.6 K/UL — SIGNIFICANT CHANGE UP (ref 3.8–10.5)
WBC # FLD AUTO: 6.6 K/UL — SIGNIFICANT CHANGE UP (ref 3.8–10.5)
WBC # FLD AUTO: 8.6 K/UL — SIGNIFICANT CHANGE UP (ref 3.8–10.5)

## 2017-04-09 PROCEDURE — 99291 CRITICAL CARE FIRST HOUR: CPT

## 2017-04-09 PROCEDURE — 93010 ELECTROCARDIOGRAM REPORT: CPT

## 2017-04-09 PROCEDURE — 71010: CPT | Mod: 26

## 2017-04-09 RX ORDER — HYDROMORPHONE HYDROCHLORIDE 2 MG/ML
0.5 INJECTION INTRAMUSCULAR; INTRAVENOUS; SUBCUTANEOUS
Qty: 0 | Refills: 0 | Status: DISCONTINUED | OUTPATIENT
Start: 2017-04-09 | End: 2017-04-10

## 2017-04-09 RX ORDER — ACETAMINOPHEN 500 MG
1000 TABLET ORAL ONCE
Qty: 0 | Refills: 0 | Status: COMPLETED | OUTPATIENT
Start: 2017-04-09 | End: 2017-04-09

## 2017-04-09 RX ORDER — SODIUM CHLORIDE 9 MG/ML
1000 INJECTION INTRAMUSCULAR; INTRAVENOUS; SUBCUTANEOUS
Qty: 0 | Refills: 0 | Status: DISCONTINUED | OUTPATIENT
Start: 2017-04-09 | End: 2017-04-09

## 2017-04-09 RX ORDER — SODIUM CHLORIDE 9 MG/ML
1000 INJECTION, SOLUTION INTRAVENOUS
Qty: 0 | Refills: 0 | Status: DISCONTINUED | OUTPATIENT
Start: 2017-04-09 | End: 2017-04-10

## 2017-04-09 RX ORDER — METOPROLOL TARTRATE 50 MG
5 TABLET ORAL EVERY 4 HOURS
Qty: 0 | Refills: 0 | Status: DISCONTINUED | OUTPATIENT
Start: 2017-04-09 | End: 2017-04-10

## 2017-04-09 RX ORDER — ACETAMINOPHEN 500 MG
1000 TABLET ORAL ONCE
Qty: 0 | Refills: 0 | Status: DISCONTINUED | OUTPATIENT
Start: 2017-04-09 | End: 2017-04-09

## 2017-04-09 RX ORDER — SODIUM CHLORIDE 9 MG/ML
1000 INJECTION INTRAMUSCULAR; INTRAVENOUS; SUBCUTANEOUS ONCE
Qty: 0 | Refills: 0 | Status: COMPLETED | OUTPATIENT
Start: 2017-04-09 | End: 2017-04-09

## 2017-04-09 RX ORDER — CALCIUM GLUCONATE 100 MG/ML
1 VIAL (ML) INTRAVENOUS ONCE
Qty: 0 | Refills: 0 | Status: COMPLETED | OUTPATIENT
Start: 2017-04-09 | End: 2017-04-09

## 2017-04-09 RX ORDER — METOPROLOL TARTRATE 50 MG
5 TABLET ORAL EVERY 6 HOURS
Qty: 0 | Refills: 0 | Status: DISCONTINUED | OUTPATIENT
Start: 2017-04-09 | End: 2017-04-09

## 2017-04-09 RX ORDER — POTASSIUM PHOSPHATE, MONOBASIC POTASSIUM PHOSPHATE, DIBASIC 236; 224 MG/ML; MG/ML
15 INJECTION, SOLUTION INTRAVENOUS ONCE
Qty: 0 | Refills: 0 | Status: COMPLETED | OUTPATIENT
Start: 2017-04-09 | End: 2017-04-09

## 2017-04-09 RX ORDER — HYDROMORPHONE HYDROCHLORIDE 2 MG/ML
0.5 INJECTION INTRAMUSCULAR; INTRAVENOUS; SUBCUTANEOUS ONCE
Qty: 0 | Refills: 0 | Status: DISCONTINUED | OUTPATIENT
Start: 2017-04-09 | End: 2017-04-09

## 2017-04-09 RX ORDER — MAGNESIUM SULFATE 500 MG/ML
2 VIAL (ML) INJECTION ONCE
Qty: 0 | Refills: 0 | Status: COMPLETED | OUTPATIENT
Start: 2017-04-09 | End: 2017-04-09

## 2017-04-09 RX ADMIN — SODIUM CHLORIDE 125 MILLILITER(S): 9 INJECTION INTRAMUSCULAR; INTRAVENOUS; SUBCUTANEOUS at 09:30

## 2017-04-09 RX ADMIN — ALVIMOPAN 12 MILLIGRAM(S): 12 CAPSULE ORAL at 17:39

## 2017-04-09 RX ADMIN — HYDROMORPHONE HYDROCHLORIDE 0.5 MILLIGRAM(S): 2 INJECTION INTRAMUSCULAR; INTRAVENOUS; SUBCUTANEOUS at 09:10

## 2017-04-09 RX ADMIN — Medication 5 MILLIGRAM(S): at 12:14

## 2017-04-09 RX ADMIN — Medication 50 GRAM(S): at 00:10

## 2017-04-09 RX ADMIN — Medication 1000 MILLIGRAM(S): at 17:10

## 2017-04-09 RX ADMIN — Medication 200 GRAM(S): at 09:32

## 2017-04-09 RX ADMIN — POTASSIUM PHOSPHATE, MONOBASIC POTASSIUM PHOSPHATE, DIBASIC 62.5 MILLIMOLE(S): 236; 224 INJECTION, SOLUTION INTRAVENOUS at 01:16

## 2017-04-09 RX ADMIN — Medication 2 MILLIGRAM(S): at 04:05

## 2017-04-09 RX ADMIN — HYDROMORPHONE HYDROCHLORIDE 0.5 MILLIGRAM(S): 2 INJECTION INTRAMUSCULAR; INTRAVENOUS; SUBCUTANEOUS at 11:20

## 2017-04-09 RX ADMIN — Medication 400 MILLIGRAM(S): at 16:50

## 2017-04-09 RX ADMIN — Medication 2 MILLIGRAM(S): at 12:14

## 2017-04-09 RX ADMIN — HYDROMORPHONE HYDROCHLORIDE 30 MILLILITER(S): 2 INJECTION INTRAMUSCULAR; INTRAVENOUS; SUBCUTANEOUS at 01:07

## 2017-04-09 RX ADMIN — Medication 400 MILLIGRAM(S): at 01:07

## 2017-04-09 RX ADMIN — Medication 50 GRAM(S): at 01:14

## 2017-04-09 RX ADMIN — ALVIMOPAN 12 MILLIGRAM(S): 12 CAPSULE ORAL at 05:57

## 2017-04-09 RX ADMIN — Medication 5 MILLIGRAM(S): at 17:41

## 2017-04-09 RX ADMIN — SODIUM CHLORIDE 2000 MILLILITER(S): 9 INJECTION INTRAMUSCULAR; INTRAVENOUS; SUBCUTANEOUS at 11:30

## 2017-04-09 RX ADMIN — Medication 400 MILLIGRAM(S): at 05:19

## 2017-04-09 RX ADMIN — Medication 2 MILLIGRAM(S): at 01:00

## 2017-04-09 RX ADMIN — ENOXAPARIN SODIUM 40 MILLIGRAM(S): 100 INJECTION SUBCUTANEOUS at 12:18

## 2017-04-09 RX ADMIN — HYDROMORPHONE HYDROCHLORIDE 0.5 MILLIGRAM(S): 2 INJECTION INTRAMUSCULAR; INTRAVENOUS; SUBCUTANEOUS at 11:50

## 2017-04-09 RX ADMIN — SODIUM CHLORIDE 125 MILLILITER(S): 9 INJECTION, SOLUTION INTRAVENOUS at 01:32

## 2017-04-10 LAB
ANION GAP SERPL CALC-SCNC: 15 MMOL/L — SIGNIFICANT CHANGE UP (ref 5–17)
APTT BLD: 32.9 SEC — SIGNIFICANT CHANGE UP (ref 27.5–37.4)
BUN SERPL-MCNC: 9 MG/DL — SIGNIFICANT CHANGE UP (ref 7–23)
CALCIUM SERPL-MCNC: 8.1 MG/DL — LOW (ref 8.4–10.5)
CHLORIDE SERPL-SCNC: 96 MMOL/L — SIGNIFICANT CHANGE UP (ref 96–108)
CO2 SERPL-SCNC: 22 MMOL/L — SIGNIFICANT CHANGE UP (ref 22–31)
CREAT SERPL-MCNC: 0.86 MG/DL — SIGNIFICANT CHANGE UP (ref 0.5–1.3)
GLUCOSE SERPL-MCNC: 133 MG/DL — HIGH (ref 70–99)
HCT VFR BLD CALC: 38.7 % — LOW (ref 39–50)
HGB BLD-MCNC: 13.7 G/DL — SIGNIFICANT CHANGE UP (ref 13–17)
INR BLD: 1.19 RATIO — HIGH (ref 0.88–1.16)
MAGNESIUM SERPL-MCNC: 1.8 MG/DL — SIGNIFICANT CHANGE UP (ref 1.6–2.6)
MCHC RBC-ENTMCNC: 32.3 PG — SIGNIFICANT CHANGE UP (ref 27–34)
MCHC RBC-ENTMCNC: 35.3 GM/DL — SIGNIFICANT CHANGE UP (ref 32–36)
MCV RBC AUTO: 91.5 FL — SIGNIFICANT CHANGE UP (ref 80–100)
PHOSPHATE SERPL-MCNC: 1.5 MG/DL — LOW (ref 2.5–4.5)
PLATELET # BLD AUTO: 187 K/UL — SIGNIFICANT CHANGE UP (ref 150–400)
POTASSIUM SERPL-MCNC: 3.8 MMOL/L — SIGNIFICANT CHANGE UP (ref 3.5–5.3)
POTASSIUM SERPL-SCNC: 3.8 MMOL/L — SIGNIFICANT CHANGE UP (ref 3.5–5.3)
PROTHROM AB SERPL-ACNC: 12.9 SEC — HIGH (ref 9.8–12.7)
RBC # BLD: 4.23 M/UL — SIGNIFICANT CHANGE UP (ref 4.2–5.8)
RBC # FLD: 12.2 % — SIGNIFICANT CHANGE UP (ref 10.3–14.5)
SODIUM SERPL-SCNC: 133 MMOL/L — LOW (ref 135–145)
WBC # BLD: 7 K/UL — SIGNIFICANT CHANGE UP (ref 3.8–10.5)
WBC # FLD AUTO: 7 K/UL — SIGNIFICANT CHANGE UP (ref 3.8–10.5)

## 2017-04-10 PROCEDURE — 99233 SBSQ HOSP IP/OBS HIGH 50: CPT

## 2017-04-10 PROCEDURE — 71010: CPT | Mod: 26

## 2017-04-10 RX ORDER — ACETAMINOPHEN 500 MG
650 TABLET ORAL ONCE
Qty: 0 | Refills: 0 | Status: COMPLETED | OUTPATIENT
Start: 2017-04-10 | End: 2017-04-10

## 2017-04-10 RX ORDER — MAGNESIUM SULFATE 500 MG/ML
2 VIAL (ML) INJECTION ONCE
Qty: 0 | Refills: 0 | Status: COMPLETED | OUTPATIENT
Start: 2017-04-10 | End: 2017-04-10

## 2017-04-10 RX ORDER — THIAMINE MONONITRATE (VIT B1) 100 MG
100 TABLET ORAL DAILY
Qty: 0 | Refills: 0 | Status: DISCONTINUED | OUTPATIENT
Start: 2017-04-10 | End: 2017-04-11

## 2017-04-10 RX ORDER — POTASSIUM PHOSPHATE, MONOBASIC POTASSIUM PHOSPHATE, DIBASIC 236; 224 MG/ML; MG/ML
15 INJECTION, SOLUTION INTRAVENOUS ONCE
Qty: 0 | Refills: 0 | Status: COMPLETED | OUTPATIENT
Start: 2017-04-10 | End: 2017-04-10

## 2017-04-10 RX ORDER — OXYCODONE HYDROCHLORIDE 5 MG/1
10 TABLET ORAL EVERY 4 HOURS
Qty: 0 | Refills: 0 | Status: DISCONTINUED | OUTPATIENT
Start: 2017-04-10 | End: 2017-04-14

## 2017-04-10 RX ORDER — ACETAMINOPHEN 500 MG
1000 TABLET ORAL ONCE
Qty: 0 | Refills: 0 | Status: COMPLETED | OUTPATIENT
Start: 2017-04-10 | End: 2017-04-10

## 2017-04-10 RX ORDER — FOLIC ACID 0.8 MG
1 TABLET ORAL DAILY
Qty: 0 | Refills: 0 | Status: DISCONTINUED | OUTPATIENT
Start: 2017-04-10 | End: 2017-04-11

## 2017-04-10 RX ORDER — OXYCODONE HYDROCHLORIDE 5 MG/1
5 TABLET ORAL EVERY 4 HOURS
Qty: 0 | Refills: 0 | Status: DISCONTINUED | OUTPATIENT
Start: 2017-04-10 | End: 2017-04-14

## 2017-04-10 RX ORDER — METOPROLOL TARTRATE 50 MG
25 TABLET ORAL EVERY 12 HOURS
Qty: 0 | Refills: 0 | Status: DISCONTINUED | OUTPATIENT
Start: 2017-04-10 | End: 2017-04-10

## 2017-04-10 RX ORDER — HYDROMORPHONE HYDROCHLORIDE 2 MG/ML
0.5 INJECTION INTRAMUSCULAR; INTRAVENOUS; SUBCUTANEOUS
Qty: 0 | Refills: 0 | Status: DISCONTINUED | OUTPATIENT
Start: 2017-04-10 | End: 2017-04-13

## 2017-04-10 RX ADMIN — POTASSIUM PHOSPHATE, MONOBASIC POTASSIUM PHOSPHATE, DIBASIC 62.5 MILLIMOLE(S): 236; 224 INJECTION, SOLUTION INTRAVENOUS at 01:57

## 2017-04-10 RX ADMIN — Medication 400 MILLIGRAM(S): at 00:56

## 2017-04-10 RX ADMIN — Medication 50 GRAM(S): at 01:57

## 2017-04-10 RX ADMIN — OXYCODONE HYDROCHLORIDE 5 MILLIGRAM(S): 5 TABLET ORAL at 16:50

## 2017-04-10 RX ADMIN — Medication 255 MILLIMOLE(S): at 02:22

## 2017-04-10 RX ADMIN — OXYCODONE HYDROCHLORIDE 5 MILLIGRAM(S): 5 TABLET ORAL at 17:49

## 2017-04-10 RX ADMIN — Medication 650 MILLIGRAM(S): at 23:27

## 2017-04-10 RX ADMIN — OXYCODONE HYDROCHLORIDE 5 MILLIGRAM(S): 5 TABLET ORAL at 10:45

## 2017-04-10 RX ADMIN — Medication 1 MILLIGRAM(S): at 11:33

## 2017-04-10 RX ADMIN — Medication 100 MILLIGRAM(S): at 11:33

## 2017-04-10 RX ADMIN — Medication 5 MILLIGRAM(S): at 04:27

## 2017-04-10 RX ADMIN — Medication 650 MILLIGRAM(S): at 10:45

## 2017-04-10 RX ADMIN — ENOXAPARIN SODIUM 40 MILLIGRAM(S): 100 INJECTION SUBCUTANEOUS at 11:33

## 2017-04-10 RX ADMIN — Medication 2 MILLIGRAM(S): at 03:00

## 2017-04-10 RX ADMIN — ALVIMOPAN 12 MILLIGRAM(S): 12 CAPSULE ORAL at 06:41

## 2017-04-10 RX ADMIN — OXYCODONE HYDROCHLORIDE 5 MILLIGRAM(S): 5 TABLET ORAL at 10:37

## 2017-04-10 RX ADMIN — Medication 650 MILLIGRAM(S): at 10:00

## 2017-04-10 RX ADMIN — Medication 255 MILLIMOLE(S): at 04:23

## 2017-04-10 RX ADMIN — Medication 1 TABLET(S): at 11:33

## 2017-04-10 RX ADMIN — SODIUM CHLORIDE 50 MILLILITER(S): 9 INJECTION, SOLUTION INTRAVENOUS at 11:33

## 2017-04-10 NOTE — DIETITIAN INITIAL EVALUATION ADULT. - NS AS NUTRI INTERV MEDICAL AND FOOD SUPPLEMENTS
Recommend provided Promote x 3 daily to promote PO intake Recommend provided Promote x 3 daily to promote po intake

## 2017-04-10 NOTE — DIETITIAN INITIAL EVALUATION ADULT. - PERTINENT MEDS FT
Dextrose 5% + Sodium Chloride 0.9% @ 50mL/hour, Folic Acid, Dilaudid prn, Lopressor, MVI, Thiamine, Ativan prn

## 2017-04-10 NOTE — DIETITIAN INITIAL EVALUATION ADULT. - OTHER INFO
Pt seen for length of stay on SICU. Reports lack of appetite currently due to constant pain from surgical incision and some dizziness. Denies nausea/emesis. Pt hasn't started clear liquid tray however tried Promote shake at time of interview and is amenable to receiving 3 x Promote daily while appetite is poor. BM x1 this morning. Reports NKFA. Pt seen for length of stay on SICU. Reports lack of appetite currently due to constant pain from surgical incision and some dizziness. Denies nausea/emesis. Pt hasn't started first clear liquid tray however tried Promote shake at time of interview and is amenable to receiving 3 x Promote shakes daily while appetite is poor. BM x1 this morning. Reports NKFA.

## 2017-04-10 NOTE — DIETITIAN INITIAL EVALUATION ADULT. - NS AS NUTRI INTERV FEED ASSISTANCE
Provide pt preferences when compliant with diet. Continue to monitor weight, labs, PO intake, and diet tolerance. RD remains available as needed. Provide pt preferences when compliant with diet. Continue to monitor weight, labs, po intake, and diet tolerance. RD remains available as needed.

## 2017-04-10 NOTE — DIETITIAN INITIAL EVALUATION ADULT. - NS FNS WEIGHT USED FOR CALC
dosing/Dosing weight (202.6 pounds) used for energy and fluid needs; ideal body weight (178 pounds) used for protein needs

## 2017-04-10 NOTE — DIETITIAN INITIAL EVALUATION ADULT. - ORAL INTAKE PTA
Pt reports good appetite PTA consuming at least 3 meals daily. Pt experiencing pain at time of interview and declines providing diet recall. Pt noted with ETOH abuse. Reports taking MVI daily./good

## 2017-04-10 NOTE — DIETITIAN INITIAL EVALUATION ADULT. - NS AS NUTRI INTERV ED CONTENT
Encouraged PO intake of clear liquid tray and advised pt to drink Promote shake when appetite is poor. RD remains available to provide Low Fiber nutrition education when appropriate. Encouraged po intake of clear liquid tray and advised pt to drink Promote shake when appetite is poor. RD remains available to provide Low Fiber nutrition education when appropriate.

## 2017-04-10 NOTE — DIETITIAN INITIAL EVALUATION ADULT. - SOURCE
other (specify)/patient/Comprehensive medical record review; discussed at rounds patient/Comprehensive medical record review; pt discussed at rounds/other (specify)

## 2017-04-10 NOTE — DIETITIAN INITIAL EVALUATION ADULT. - ENERGY NEEDS
Height: 72 inches, Weight: 202.6 pounds, BMI: 27.5, IBW: 178 pounds (+/-10%), 114%IBW  Pertinent Information: Height: 72 inches, Weight: 202.6 pounds, BMI: 27.5, IBW: 178 pounds (+/-10%), 114%IBW  Pertinent Information:  S/p Laparoscopic robotic assisted extended right hemicolectomy for colonic dysplasia POD#3. Pt with history of HTN and ETOH abuse; transferred to SICU for alcohol withdrawal.  No edema or pressure ulcers noted at this time. Height: 72 inches, Weight: 202.6 pounds, BMI: 27.5, IBW: 178 pounds (+/-10%), 114%IBW  Pertinent Information:  S/P Laparoscopic robotic assisted extended right hemicolectomy for colonic dysplasia POD#3. Pt with history of HTN and ETOH abuse; transferred to SICU for alcohol withdrawal.  No edema or pressure ulcers noted at this time.

## 2017-04-11 LAB
ANION GAP SERPL CALC-SCNC: 13 MMOL/L — SIGNIFICANT CHANGE UP (ref 5–17)
APPEARANCE UR: CLEAR — SIGNIFICANT CHANGE UP
BILIRUB UR-MCNC: NEGATIVE — SIGNIFICANT CHANGE UP
BUN SERPL-MCNC: 10 MG/DL — SIGNIFICANT CHANGE UP (ref 7–23)
CALCIUM SERPL-MCNC: 8.7 MG/DL — SIGNIFICANT CHANGE UP (ref 8.4–10.5)
CHLORIDE SERPL-SCNC: 97 MMOL/L — SIGNIFICANT CHANGE UP (ref 96–108)
CO2 SERPL-SCNC: 26 MMOL/L — SIGNIFICANT CHANGE UP (ref 22–31)
COLOR SPEC: YELLOW — SIGNIFICANT CHANGE UP
CREAT SERPL-MCNC: 1.1 MG/DL — SIGNIFICANT CHANGE UP (ref 0.5–1.3)
CULTURE RESULTS: NO GROWTH — SIGNIFICANT CHANGE UP
DIFF PNL FLD: NEGATIVE — SIGNIFICANT CHANGE UP
GLUCOSE SERPL-MCNC: 107 MG/DL — HIGH (ref 70–99)
GLUCOSE UR QL: NEGATIVE — SIGNIFICANT CHANGE UP
HCT VFR BLD CALC: 40.7 % — SIGNIFICANT CHANGE UP (ref 39–50)
HGB BLD-MCNC: 14.1 G/DL — SIGNIFICANT CHANGE UP (ref 13–17)
KETONES UR-MCNC: ABNORMAL
LACTATE BLDV-MCNC: 1.2 MMOL/L — SIGNIFICANT CHANGE UP (ref 0.7–2)
LEUKOCYTE ESTERASE UR-ACNC: NEGATIVE — SIGNIFICANT CHANGE UP
MAGNESIUM SERPL-MCNC: 2.3 MG/DL — SIGNIFICANT CHANGE UP (ref 1.6–2.6)
MCHC RBC-ENTMCNC: 32 PG — SIGNIFICANT CHANGE UP (ref 27–34)
MCHC RBC-ENTMCNC: 34.7 GM/DL — SIGNIFICANT CHANGE UP (ref 32–36)
MCV RBC AUTO: 92.3 FL — SIGNIFICANT CHANGE UP (ref 80–100)
NITRITE UR-MCNC: NEGATIVE — SIGNIFICANT CHANGE UP
PH UR: 6 — SIGNIFICANT CHANGE UP (ref 4.8–8)
PHOSPHATE SERPL-MCNC: 3.1 MG/DL — SIGNIFICANT CHANGE UP (ref 2.5–4.5)
PLATELET # BLD AUTO: 174 K/UL — SIGNIFICANT CHANGE UP (ref 150–400)
POTASSIUM SERPL-MCNC: 3.7 MMOL/L — SIGNIFICANT CHANGE UP (ref 3.5–5.3)
POTASSIUM SERPL-SCNC: 3.7 MMOL/L — SIGNIFICANT CHANGE UP (ref 3.5–5.3)
PROT UR-MCNC: 100 MG/DL
RBC # BLD: 4.41 M/UL — SIGNIFICANT CHANGE UP (ref 4.2–5.8)
RBC # FLD: 12.6 % — SIGNIFICANT CHANGE UP (ref 10.3–14.5)
RBC CASTS # UR COMP ASSIST: SIGNIFICANT CHANGE UP /HPF (ref 0–2)
SODIUM SERPL-SCNC: 136 MMOL/L — SIGNIFICANT CHANGE UP (ref 135–145)
SP GR SPEC: 1.02 — SIGNIFICANT CHANGE UP (ref 1.01–1.02)
SPECIMEN SOURCE: SIGNIFICANT CHANGE UP
UROBILINOGEN FLD QL: 1
WBC # BLD: 6 K/UL — SIGNIFICANT CHANGE UP (ref 3.8–10.5)
WBC # FLD AUTO: 6 K/UL — SIGNIFICANT CHANGE UP (ref 3.8–10.5)
WBC UR QL: SIGNIFICANT CHANGE UP /HPF (ref 0–5)

## 2017-04-11 PROCEDURE — 71010: CPT | Mod: 26

## 2017-04-11 PROCEDURE — 99233 SBSQ HOSP IP/OBS HIGH 50: CPT

## 2017-04-11 PROCEDURE — 74177 CT ABD & PELVIS W/CONTRAST: CPT | Mod: 26

## 2017-04-11 RX ORDER — ACETAMINOPHEN 500 MG
650 TABLET ORAL EVERY 6 HOURS
Qty: 0 | Refills: 0 | Status: DISCONTINUED | OUTPATIENT
Start: 2017-04-11 | End: 2017-04-14

## 2017-04-11 RX ORDER — POTASSIUM CHLORIDE 20 MEQ
20 PACKET (EA) ORAL
Qty: 0 | Refills: 0 | Status: DISCONTINUED | OUTPATIENT
Start: 2017-04-11 | End: 2017-04-11

## 2017-04-11 RX ORDER — ACETAMINOPHEN 500 MG
650 TABLET ORAL ONCE
Qty: 0 | Refills: 0 | Status: COMPLETED | OUTPATIENT
Start: 2017-04-11 | End: 2017-04-11

## 2017-04-11 RX ORDER — ACETAMINOPHEN 500 MG
650 TABLET ORAL EVERY 6 HOURS
Qty: 0 | Refills: 0 | Status: DISCONTINUED | OUTPATIENT
Start: 2017-04-11 | End: 2017-04-12

## 2017-04-11 RX ORDER — POTASSIUM CHLORIDE 20 MEQ
20 PACKET (EA) ORAL
Qty: 0 | Refills: 0 | Status: COMPLETED | OUTPATIENT
Start: 2017-04-11 | End: 2017-04-11

## 2017-04-11 RX ADMIN — Medication 650 MILLIGRAM(S): at 06:45

## 2017-04-11 RX ADMIN — Medication 650 MILLIGRAM(S): at 00:00

## 2017-04-11 RX ADMIN — ENOXAPARIN SODIUM 40 MILLIGRAM(S): 100 INJECTION SUBCUTANEOUS at 12:23

## 2017-04-11 RX ADMIN — Medication 1 TABLET(S): at 12:23

## 2017-04-11 RX ADMIN — OXYCODONE HYDROCHLORIDE 5 MILLIGRAM(S): 5 TABLET ORAL at 12:45

## 2017-04-11 RX ADMIN — OXYCODONE HYDROCHLORIDE 10 MILLIGRAM(S): 5 TABLET ORAL at 18:15

## 2017-04-11 RX ADMIN — Medication 20 MILLIEQUIVALENT(S): at 07:59

## 2017-04-11 RX ADMIN — OXYCODONE HYDROCHLORIDE 10 MILLIGRAM(S): 5 TABLET ORAL at 17:45

## 2017-04-11 RX ADMIN — OXYCODONE HYDROCHLORIDE 5 MILLIGRAM(S): 5 TABLET ORAL at 08:00

## 2017-04-11 RX ADMIN — Medication 20 MILLIEQUIVALENT(S): at 06:42

## 2017-04-11 RX ADMIN — OXYCODONE HYDROCHLORIDE 5 MILLIGRAM(S): 5 TABLET ORAL at 12:15

## 2017-04-11 RX ADMIN — Medication 20 MILLIEQUIVALENT(S): at 11:48

## 2017-04-11 RX ADMIN — Medication 650 MILLIGRAM(S): at 19:30

## 2017-04-11 RX ADMIN — OXYCODONE HYDROCHLORIDE 5 MILLIGRAM(S): 5 TABLET ORAL at 08:30

## 2017-04-11 RX ADMIN — Medication 650 MILLIGRAM(S): at 14:51

## 2017-04-11 RX ADMIN — Medication 650 MILLIGRAM(S): at 07:15

## 2017-04-12 LAB
ALBUMIN SERPL ELPH-MCNC: 3.5 G/DL — SIGNIFICANT CHANGE UP (ref 3.3–5)
ALP SERPL-CCNC: 50 U/L — SIGNIFICANT CHANGE UP (ref 40–120)
ALT FLD-CCNC: 10 U/L RC — SIGNIFICANT CHANGE UP (ref 10–45)
AMYLASE P1 CFR SERPL: 49 U/L — SIGNIFICANT CHANGE UP (ref 25–125)
ANION GAP SERPL CALC-SCNC: 13 MMOL/L — SIGNIFICANT CHANGE UP (ref 5–17)
AST SERPL-CCNC: 18 U/L — SIGNIFICANT CHANGE UP (ref 10–40)
BILIRUB DIRECT SERPL-MCNC: 0.2 MG/DL — SIGNIFICANT CHANGE UP (ref 0–0.2)
BILIRUB INDIRECT FLD-MCNC: 0.4 MG/DL — SIGNIFICANT CHANGE UP (ref 0.2–1)
BILIRUB SERPL-MCNC: 0.6 MG/DL — SIGNIFICANT CHANGE UP (ref 0.2–1.2)
BUN SERPL-MCNC: 12 MG/DL — SIGNIFICANT CHANGE UP (ref 7–23)
CALCIUM SERPL-MCNC: 8.4 MG/DL — SIGNIFICANT CHANGE UP (ref 8.4–10.5)
CHLORIDE SERPL-SCNC: 95 MMOL/L — LOW (ref 96–108)
CO2 SERPL-SCNC: 24 MMOL/L — SIGNIFICANT CHANGE UP (ref 22–31)
CREAT SERPL-MCNC: 1.1 MG/DL — SIGNIFICANT CHANGE UP (ref 0.5–1.3)
GAS PNL BLDV: SIGNIFICANT CHANGE UP
GLUCOSE SERPL-MCNC: 117 MG/DL — HIGH (ref 70–99)
HCT VFR BLD CALC: 37 % — LOW (ref 39–50)
HGB BLD-MCNC: 12.9 G/DL — LOW (ref 13–17)
LIDOCAIN IGE QN: 40 U/L — SIGNIFICANT CHANGE UP (ref 7–60)
MAGNESIUM SERPL-MCNC: 1.7 MG/DL — SIGNIFICANT CHANGE UP (ref 1.6–2.6)
MCHC RBC-ENTMCNC: 31.8 PG — SIGNIFICANT CHANGE UP (ref 27–34)
MCHC RBC-ENTMCNC: 35 GM/DL — SIGNIFICANT CHANGE UP (ref 32–36)
MCV RBC AUTO: 90.9 FL — SIGNIFICANT CHANGE UP (ref 80–100)
PHOSPHATE SERPL-MCNC: 2.4 MG/DL — LOW (ref 2.5–4.5)
PLATELET # BLD AUTO: 164 K/UL — SIGNIFICANT CHANGE UP (ref 150–400)
POTASSIUM SERPL-MCNC: 4 MMOL/L — SIGNIFICANT CHANGE UP (ref 3.5–5.3)
POTASSIUM SERPL-SCNC: 4 MMOL/L — SIGNIFICANT CHANGE UP (ref 3.5–5.3)
PROCALCITONIN SERPL-MCNC: 1.2 NG/ML — HIGH (ref 0–0.05)
PROT SERPL-MCNC: 6 G/DL — SIGNIFICANT CHANGE UP (ref 6–8.3)
RBC # BLD: 4.07 M/UL — LOW (ref 4.2–5.8)
RBC # FLD: 12.3 % — SIGNIFICANT CHANGE UP (ref 10.3–14.5)
SODIUM SERPL-SCNC: 132 MMOL/L — LOW (ref 135–145)
SURGICAL PATHOLOGY STUDY: SIGNIFICANT CHANGE UP
WBC # BLD: 5.1 K/UL — SIGNIFICANT CHANGE UP (ref 3.8–10.5)
WBC # FLD AUTO: 5.1 K/UL — SIGNIFICANT CHANGE UP (ref 3.8–10.5)

## 2017-04-12 PROCEDURE — 93970 EXTREMITY STUDY: CPT | Mod: 26

## 2017-04-12 PROCEDURE — 99233 SBSQ HOSP IP/OBS HIGH 50: CPT

## 2017-04-12 RX ORDER — MAGNESIUM SULFATE 500 MG/ML
2 VIAL (ML) INJECTION ONCE
Qty: 0 | Refills: 0 | Status: COMPLETED | OUTPATIENT
Start: 2017-04-12 | End: 2017-04-12

## 2017-04-12 RX ADMIN — OXYCODONE HYDROCHLORIDE 5 MILLIGRAM(S): 5 TABLET ORAL at 08:45

## 2017-04-12 RX ADMIN — Medication 127.5 MILLIMOLE(S): at 06:29

## 2017-04-12 RX ADMIN — OXYCODONE HYDROCHLORIDE 10 MILLIGRAM(S): 5 TABLET ORAL at 00:32

## 2017-04-12 RX ADMIN — Medication 650 MILLIGRAM(S): at 07:55

## 2017-04-12 RX ADMIN — OXYCODONE HYDROCHLORIDE 10 MILLIGRAM(S): 5 TABLET ORAL at 21:30

## 2017-04-12 RX ADMIN — OXYCODONE HYDROCHLORIDE 10 MILLIGRAM(S): 5 TABLET ORAL at 16:45

## 2017-04-12 RX ADMIN — Medication 650 MILLIGRAM(S): at 01:10

## 2017-04-12 RX ADMIN — OXYCODONE HYDROCHLORIDE 10 MILLIGRAM(S): 5 TABLET ORAL at 22:00

## 2017-04-12 RX ADMIN — OXYCODONE HYDROCHLORIDE 5 MILLIGRAM(S): 5 TABLET ORAL at 07:55

## 2017-04-12 RX ADMIN — OXYCODONE HYDROCHLORIDE 10 MILLIGRAM(S): 5 TABLET ORAL at 16:03

## 2017-04-12 RX ADMIN — OXYCODONE HYDROCHLORIDE 10 MILLIGRAM(S): 5 TABLET ORAL at 00:02

## 2017-04-12 RX ADMIN — ENOXAPARIN SODIUM 40 MILLIGRAM(S): 100 INJECTION SUBCUTANEOUS at 11:37

## 2017-04-12 RX ADMIN — Medication 1 TABLET(S): at 11:37

## 2017-04-12 RX ADMIN — Medication 127.5 MILLIMOLE(S): at 07:47

## 2017-04-12 RX ADMIN — Medication 50 GRAM(S): at 05:34

## 2017-04-13 LAB
ANION GAP SERPL CALC-SCNC: 16 MMOL/L — SIGNIFICANT CHANGE UP (ref 5–17)
BUN SERPL-MCNC: 15 MG/DL — SIGNIFICANT CHANGE UP (ref 7–23)
CALCIUM SERPL-MCNC: 8.6 MG/DL — SIGNIFICANT CHANGE UP (ref 8.4–10.5)
CHLORIDE SERPL-SCNC: 96 MMOL/L — SIGNIFICANT CHANGE UP (ref 96–108)
CO2 SERPL-SCNC: 22 MMOL/L — SIGNIFICANT CHANGE UP (ref 22–31)
CREAT SERPL-MCNC: 0.95 MG/DL — SIGNIFICANT CHANGE UP (ref 0.5–1.3)
GLUCOSE SERPL-MCNC: 115 MG/DL — HIGH (ref 70–99)
HCT VFR BLD CALC: 37.7 % — LOW (ref 39–50)
HGB BLD-MCNC: 13 G/DL — SIGNIFICANT CHANGE UP (ref 13–17)
MAGNESIUM SERPL-MCNC: 2.2 MG/DL — SIGNIFICANT CHANGE UP (ref 1.6–2.6)
MCHC RBC-ENTMCNC: 31.7 PG — SIGNIFICANT CHANGE UP (ref 27–34)
MCHC RBC-ENTMCNC: 34.6 GM/DL — SIGNIFICANT CHANGE UP (ref 32–36)
MCV RBC AUTO: 91.6 FL — SIGNIFICANT CHANGE UP (ref 80–100)
PHOSPHATE SERPL-MCNC: 3.8 MG/DL — SIGNIFICANT CHANGE UP (ref 2.5–4.5)
PLATELET # BLD AUTO: 172 K/UL — SIGNIFICANT CHANGE UP (ref 150–400)
POTASSIUM SERPL-MCNC: 3.9 MMOL/L — SIGNIFICANT CHANGE UP (ref 3.5–5.3)
POTASSIUM SERPL-SCNC: 3.9 MMOL/L — SIGNIFICANT CHANGE UP (ref 3.5–5.3)
RBC # BLD: 4.11 M/UL — LOW (ref 4.2–5.8)
RBC # FLD: 12.8 % — SIGNIFICANT CHANGE UP (ref 10.3–14.5)
SODIUM SERPL-SCNC: 134 MMOL/L — LOW (ref 135–145)
WBC # BLD: 6.4 K/UL — SIGNIFICANT CHANGE UP (ref 3.8–10.5)
WBC # FLD AUTO: 6.4 K/UL — SIGNIFICANT CHANGE UP (ref 3.8–10.5)

## 2017-04-13 PROCEDURE — 99233 SBSQ HOSP IP/OBS HIGH 50: CPT

## 2017-04-13 RX ORDER — BENZOCAINE AND MENTHOL 5; 1 G/100ML; G/100ML
1 LIQUID ORAL EVERY 4 HOURS
Qty: 0 | Refills: 0 | Status: DISCONTINUED | OUTPATIENT
Start: 2017-04-13 | End: 2017-04-14

## 2017-04-13 RX ORDER — DOCUSATE SODIUM 100 MG
100 CAPSULE ORAL THREE TIMES A DAY
Qty: 0 | Refills: 0 | Status: DISCONTINUED | OUTPATIENT
Start: 2017-04-13 | End: 2017-04-14

## 2017-04-13 RX ADMIN — OXYCODONE HYDROCHLORIDE 10 MILLIGRAM(S): 5 TABLET ORAL at 01:09

## 2017-04-13 RX ADMIN — ENOXAPARIN SODIUM 40 MILLIGRAM(S): 100 INJECTION SUBCUTANEOUS at 11:33

## 2017-04-13 RX ADMIN — BENZOCAINE AND MENTHOL 1 LOZENGE: 5; 1 LIQUID ORAL at 20:01

## 2017-04-13 RX ADMIN — OXYCODONE HYDROCHLORIDE 10 MILLIGRAM(S): 5 TABLET ORAL at 20:12

## 2017-04-13 RX ADMIN — OXYCODONE HYDROCHLORIDE 10 MILLIGRAM(S): 5 TABLET ORAL at 01:39

## 2017-04-13 RX ADMIN — Medication 1 TABLET(S): at 11:33

## 2017-04-13 RX ADMIN — OXYCODONE HYDROCHLORIDE 10 MILLIGRAM(S): 5 TABLET ORAL at 19:42

## 2017-04-13 RX ADMIN — OXYCODONE HYDROCHLORIDE 10 MILLIGRAM(S): 5 TABLET ORAL at 08:00

## 2017-04-13 RX ADMIN — OXYCODONE HYDROCHLORIDE 10 MILLIGRAM(S): 5 TABLET ORAL at 12:00

## 2017-04-13 RX ADMIN — OXYCODONE HYDROCHLORIDE 10 MILLIGRAM(S): 5 TABLET ORAL at 08:48

## 2017-04-14 ENCOUNTER — TRANSCRIPTION ENCOUNTER (OUTPATIENT)
Age: 57
End: 2017-04-14

## 2017-04-14 VITALS
DIASTOLIC BLOOD PRESSURE: 82 MMHG | HEART RATE: 99 BPM | RESPIRATION RATE: 18 BRPM | SYSTOLIC BLOOD PRESSURE: 125 MMHG | OXYGEN SATURATION: 96 % | TEMPERATURE: 99 F

## 2017-04-14 LAB
ANION GAP SERPL CALC-SCNC: 15 MMOL/L — SIGNIFICANT CHANGE UP (ref 5–17)
BUN SERPL-MCNC: 16 MG/DL — SIGNIFICANT CHANGE UP (ref 7–23)
CA-I BLD-SCNC: 1.17 MMOL/L — SIGNIFICANT CHANGE UP (ref 1.05–1.34)
CALCIUM SERPL-MCNC: 8.9 MG/DL — SIGNIFICANT CHANGE UP (ref 8.4–10.5)
CHLORIDE SERPL-SCNC: 92 MMOL/L — LOW (ref 96–108)
CO2 SERPL-SCNC: 26 MMOL/L — SIGNIFICANT CHANGE UP (ref 22–31)
CREAT SERPL-MCNC: 1.02 MG/DL — SIGNIFICANT CHANGE UP (ref 0.5–1.3)
CULTURE RESULTS: SIGNIFICANT CHANGE UP
GLUCOSE SERPL-MCNC: 102 MG/DL — HIGH (ref 70–99)
HCT VFR BLD CALC: 37.7 % — LOW (ref 39–50)
HGB BLD-MCNC: 12.8 G/DL — LOW (ref 13–17)
MAGNESIUM SERPL-MCNC: 2.2 MG/DL — SIGNIFICANT CHANGE UP (ref 1.6–2.6)
MCHC RBC-ENTMCNC: 30 PG — SIGNIFICANT CHANGE UP (ref 27–34)
MCHC RBC-ENTMCNC: 34 GM/DL — SIGNIFICANT CHANGE UP (ref 32–36)
MCV RBC AUTO: 88.3 FL — SIGNIFICANT CHANGE UP (ref 80–100)
PHOSPHATE SERPL-MCNC: 3.2 MG/DL — SIGNIFICANT CHANGE UP (ref 2.5–4.5)
PLATELET # BLD AUTO: 253 K/UL — SIGNIFICANT CHANGE UP (ref 150–400)
POTASSIUM SERPL-MCNC: 4.1 MMOL/L — SIGNIFICANT CHANGE UP (ref 3.5–5.3)
POTASSIUM SERPL-SCNC: 4.1 MMOL/L — SIGNIFICANT CHANGE UP (ref 3.5–5.3)
RBC # BLD: 4.27 M/UL — SIGNIFICANT CHANGE UP (ref 4.2–5.8)
RBC # FLD: 13.8 % — SIGNIFICANT CHANGE UP (ref 10.3–14.5)
SODIUM SERPL-SCNC: 133 MMOL/L — LOW (ref 135–145)
SPECIMEN SOURCE: SIGNIFICANT CHANGE UP
WBC # BLD: 6.31 K/UL — SIGNIFICANT CHANGE UP (ref 3.8–10.5)
WBC # FLD AUTO: 6.31 K/UL — SIGNIFICANT CHANGE UP (ref 3.8–10.5)

## 2017-04-14 PROCEDURE — 83690 ASSAY OF LIPASE: CPT

## 2017-04-14 PROCEDURE — 85610 PROTHROMBIN TIME: CPT

## 2017-04-14 PROCEDURE — 83735 ASSAY OF MAGNESIUM: CPT

## 2017-04-14 PROCEDURE — 74177 CT ABD & PELVIS W/CONTRAST: CPT

## 2017-04-14 PROCEDURE — 86901 BLOOD TYPING SEROLOGIC RH(D): CPT

## 2017-04-14 PROCEDURE — 82435 ASSAY OF BLOOD CHLORIDE: CPT

## 2017-04-14 PROCEDURE — 82947 ASSAY GLUCOSE BLOOD QUANT: CPT

## 2017-04-14 PROCEDURE — 86850 RBC ANTIBODY SCREEN: CPT

## 2017-04-14 PROCEDURE — 84100 ASSAY OF PHOSPHORUS: CPT

## 2017-04-14 PROCEDURE — 82150 ASSAY OF AMYLASE: CPT

## 2017-04-14 PROCEDURE — 85027 COMPLETE CBC AUTOMATED: CPT

## 2017-04-14 PROCEDURE — 86923 COMPATIBILITY TEST ELECTRIC: CPT

## 2017-04-14 PROCEDURE — 82553 CREATINE MB FRACTION: CPT

## 2017-04-14 PROCEDURE — 80048 BASIC METABOLIC PNL TOTAL CA: CPT

## 2017-04-14 PROCEDURE — 80076 HEPATIC FUNCTION PANEL: CPT

## 2017-04-14 PROCEDURE — 81001 URINALYSIS AUTO W/SCOPE: CPT

## 2017-04-14 PROCEDURE — 87040 BLOOD CULTURE FOR BACTERIA: CPT

## 2017-04-14 PROCEDURE — 83605 ASSAY OF LACTIC ACID: CPT

## 2017-04-14 PROCEDURE — 93005 ELECTROCARDIOGRAM TRACING: CPT

## 2017-04-14 PROCEDURE — 83930 ASSAY OF BLOOD OSMOLALITY: CPT

## 2017-04-14 PROCEDURE — 85730 THROMBOPLASTIN TIME PARTIAL: CPT

## 2017-04-14 PROCEDURE — 84145 PROCALCITONIN (PCT): CPT

## 2017-04-14 PROCEDURE — 84300 ASSAY OF URINE SODIUM: CPT

## 2017-04-14 PROCEDURE — 82330 ASSAY OF CALCIUM: CPT

## 2017-04-14 PROCEDURE — 82570 ASSAY OF URINE CREATININE: CPT

## 2017-04-14 PROCEDURE — 84295 ASSAY OF SERUM SODIUM: CPT

## 2017-04-14 PROCEDURE — 99221 1ST HOSP IP/OBS SF/LOW 40: CPT

## 2017-04-14 PROCEDURE — 87086 URINE CULTURE/COLONY COUNT: CPT

## 2017-04-14 PROCEDURE — 71045 X-RAY EXAM CHEST 1 VIEW: CPT

## 2017-04-14 PROCEDURE — 84484 ASSAY OF TROPONIN QUANT: CPT

## 2017-04-14 PROCEDURE — 84132 ASSAY OF SERUM POTASSIUM: CPT

## 2017-04-14 PROCEDURE — 85014 HEMATOCRIT: CPT

## 2017-04-14 PROCEDURE — 82803 BLOOD GASES ANY COMBINATION: CPT

## 2017-04-14 PROCEDURE — 83935 ASSAY OF URINE OSMOLALITY: CPT

## 2017-04-14 PROCEDURE — P9016: CPT

## 2017-04-14 PROCEDURE — 93970 EXTREMITY STUDY: CPT

## 2017-04-14 PROCEDURE — 86900 BLOOD TYPING SEROLOGIC ABO: CPT

## 2017-04-14 RX ORDER — PANTOPRAZOLE SODIUM 20 MG/1
1 TABLET, DELAYED RELEASE ORAL
Qty: 0 | Refills: 0 | COMMUNITY

## 2017-04-14 RX ORDER — PANTOPRAZOLE SODIUM 20 MG/1
1 TABLET, DELAYED RELEASE ORAL
Qty: 30 | Refills: 0 | OUTPATIENT
Start: 2017-04-14

## 2017-04-14 RX ORDER — OXYCODONE HYDROCHLORIDE 5 MG/1
1 TABLET ORAL
Qty: 0 | Refills: 0 | COMMUNITY
Start: 2017-04-14

## 2017-04-14 RX ORDER — ACETAMINOPHEN 500 MG
2 TABLET ORAL
Qty: 0 | Refills: 0 | COMMUNITY
Start: 2017-04-14

## 2017-04-14 RX ORDER — DOCUSATE SODIUM 100 MG
1 CAPSULE ORAL
Qty: 0 | Refills: 0 | COMMUNITY
Start: 2017-04-14

## 2017-04-14 RX ORDER — PANTOPRAZOLE SODIUM 20 MG/1
40 TABLET, DELAYED RELEASE ORAL
Qty: 0 | Refills: 0 | Status: DISCONTINUED | OUTPATIENT
Start: 2017-04-14 | End: 2017-04-14

## 2017-04-14 RX ORDER — ENOXAPARIN SODIUM 100 MG/ML
40 INJECTION SUBCUTANEOUS
Qty: 56 | Refills: 0 | OUTPATIENT
Start: 2017-04-14

## 2017-04-14 RX ORDER — DIPHENHYDRAMINE HYDROCHLORIDE AND LIDOCAINE HYDROCHLORIDE AND ALUMINUM HYDROXIDE AND MAGNESIUM HYDRO
30 KIT
Qty: 300 | Refills: 0 | OUTPATIENT
Start: 2017-04-14 | End: 2017-04-21

## 2017-04-14 RX ORDER — OXYCODONE HYDROCHLORIDE 5 MG/1
1 TABLET ORAL
Qty: 30 | Refills: 0 | OUTPATIENT
Start: 2017-04-14

## 2017-04-14 RX ORDER — DIPHENHYDRAMINE HYDROCHLORIDE AND LIDOCAINE HYDROCHLORIDE AND ALUMINUM HYDROXIDE AND MAGNESIUM HYDRO
30 KIT THREE TIMES A DAY
Qty: 0 | Refills: 0 | Status: DISCONTINUED | OUTPATIENT
Start: 2017-04-14 | End: 2017-04-14

## 2017-04-14 RX ADMIN — Medication 1 TABLET(S): at 11:21

## 2017-04-14 RX ADMIN — PANTOPRAZOLE SODIUM 40 MILLIGRAM(S): 20 TABLET, DELAYED RELEASE ORAL at 09:01

## 2017-04-14 RX ADMIN — ENOXAPARIN SODIUM 40 MILLIGRAM(S): 100 INJECTION SUBCUTANEOUS at 11:21

## 2017-04-14 RX ADMIN — Medication 30 MILLILITER(S): at 09:44

## 2017-04-14 RX ADMIN — DIPHENHYDRAMINE HYDROCHLORIDE AND LIDOCAINE HYDROCHLORIDE AND ALUMINUM HYDROXIDE AND MAGNESIUM HYDRO 30 MILLILITER(S): KIT at 13:44

## 2017-04-14 NOTE — PROVIDER CONTACT NOTE (OTHER) - BACKGROUND
Pt states, last drink was "a couple days ago." s/p lap R colon resxn post opt day #1
s/p lap R colon rsxn post opt day #1 PMH of ETOH - last drink as per patient, "a couple days ago."
4/7- lap right colon rsxn
Pt s/p laparoscopic procedure with left hemicolectomy.
S/ P Rt hemicolectomy

## 2017-04-14 NOTE — DISCHARGE NOTE ADULT - PLAN OF CARE
return to daily living activity prior to surgery, postoperative recovery WOUND CARE:   BATHING: Please do not submerge wound underwater. You may shower and/or sponge bathe.  ACTIVITY: No heavy lifting or straining. Otherwise, you may return to your usual level of physical activity. If you are taking narcotic pain medication (such as Percocet), do NOT drive a car, operate machinery or make important decisions.  DIET: Return to your usual diet.  NOTIFY YOUR SURGEON IF: You have any bleeding that does not stop, any pus draining from your wound, any fever (over 100.4 F) or chills, persistent nausea/vomiting, persistent diarrhea, or if your pain is not controlled on your discharge pain medications.  FOLLOW-UP:  1. Please call to make a follow-up appointment within one week of discharge   2. Please follow up with your primary care physician in one week regarding your hospitalization. WOUND CARE: Staples will be removed at follow up office visit.    BATHING: Please do not submerge wound underwater. You may shower and/or sponge bathe.  ACTIVITY: No heavy lifting or straining. Otherwise, you may return to your usual level of physical activity. If you are taking narcotic pain medication (such as Percocet), do NOT drive a car, operate machinery or make important decisions.  DIET: Low residue diet  NOTIFY YOUR SURGEON IF: You have any bleeding that does not stop, any pus draining from your wound, any fever (over 100.4 F) or chills, persistent nausea/vomiting, persistent diarrhea, or if your pain is not controlled on your discharge pain medications.  FOLLOW-UP:  1. Please call to make a follow-up appointment within one week of discharge with Dr. Cooley (See below for office information to call for an appointment)   2. Please follow up with your primary care physician in one week regarding your hospitalization.

## 2017-04-14 NOTE — PROVIDER CONTACT NOTE (OTHER) - SITUATION
Pt w/high temp of 101.5 + high blood pressure of 192/120 w/hr of 113. Pt on CIWA, 0 until 2200 - scoring 28 - 2mg ativan given iv push.
Pt with high temp of 102.5, HR of 117, BP of 162/92
Pt /101 electronic, 160/100 manual, , Temp 100.6 degrees F. Pt does c/o abd pain 6/10 w/ occasional grimacing. [facility] any chest pain, HA, dizziness, SOB. Denies feeling hot or chills
Pt c/o of sore throat and pain inside the mouth
Pt has an oral temp. of 102.8 F. Pt is requiring opioids for pain relief in LLQ abdomen which he has been refusing. Pt is tachycardic when sleeping with HR between 115-120 beats/minute.

## 2017-04-14 NOTE — PROVIDER CONTACT NOTE (OTHER) - ASSESSMENT
Pt agitated, pulled out 2 iv's, sweating profusely, states "I don't feel good"
pt very restless, attempting to take off gown, moderately sweating, denies hallucinations or headache
Pt /101 electronic, 160/100 manual, , Temp 100.6 degrees F. Pt does c/o abd pain 6/10 w/ occasional grimacing. [facility] any chest pain, HA, dizziness, SOB. Denies chills or feeling hot. No perspiration noticed
Pt has an oral temp. of 102.8 F. Pt is requiring opioids for pain relief in LLQ abdomen which he has been refusing. Pt is tachycardic when sleeping with HR between 115-120 beats/minute.
Pt with no redness or swelling inside the mouth.

## 2017-04-14 NOTE — DISCHARGE NOTE ADULT - NS AS ACTIVITY OBS
Do not make important decisions/not while taking narcotic pain medication/Do not drive or operate machinery/No Heavy lifting/straining

## 2017-04-14 NOTE — DISCHARGE NOTE ADULT - CARE PROVIDER_API CALL
Dean Cooley), ColonRectal Surgery; Surgery  [address] [place] [address]  Phone: [phone]  Fax: [phone]

## 2017-04-14 NOTE — DISCHARGE NOTE ADULT - HOSPITAL COURSE
55 y/o male who recently had a routine colonoscopy which revealed multiple polyps underwent a robotic extended right colectomy. The patient tolerated the procedure well (see operative report for full details). Postoperatively, he was transferred to PACU in stable condition.  He had a PCA for pain control. He was given sips of clears liquids.  He was on CIWA protocol for history of alcohol abuse.  He went to the SICU for monitoring after ativan requirements increased.  He also had afib with RVR which responded to beta blockers.  He had hypophosphatemia and hyponatremia and was repleted and treated appropriately. 55 y/o male who recently had a routine colonoscopy which revealed multiple polyps underwent a robotic extended right colectomy. The patient tolerated the procedure well (see operative report for full details). Postoperatively, he was transferred to PACU in stable condition.  He had a PCA for pain control. He was given sips of clears liquids.  He was on CIWA protocol for history of alcohol abuse.  He went to the SICU for monitoring after ativan requirements increased.  He also had afib with RVR which responded to beta blockers.  He had hypophosphatemia and hyponatremia and was repleted and treated appropriately.  He was febrile and underwent a CT scan which was negative for collection.  He also had a lower extremity duplex which was negative for DVT.  Once he stabilized, he was transferred to a regular floor. Diet was advanced as tolerated.  He complained of sores on the roof of his mouth and ENT was consulted and recommended....  On day of discharge, the patient was tolerating diet, ambulating well and pain controlled. Social work followed patient during admission and he will follow up with Dr. Cooley in 1 week and then return to his inpatient alcohol facility.

## 2017-04-14 NOTE — PROVIDER CONTACT NOTE (OTHER) - ACTION/TREATMENT ORDERED:
MD came to bedside, ICU consult completed - C7, lactate, blood cx, UA sent. Pt transferred to MICU. Pt w/IV tylenol ordered, as per MD Tovar, does not want to administer.
MD made aware. Md will consult SICU. Will reassess vital signs Q1 as per CIWA.
Blood cultures x 2, tylenol for febrile temperature, UA, and will order CT of abdomen. Will contact primary team as well and come see patient.
MD Tovar aware. Will order 5mg Lopressor IVP, reassess in 30mins, encourage incentive spirometer for low grade fever. Will also admin IV Tylenol as ordered for pain. Will continue to monitor.
will continue to monitor

## 2017-04-14 NOTE — DISCHARGE NOTE ADULT - CARE PLAN
Principal Discharge DX:	Colon polyp  Goal:	return to daily living activity prior to surgery, postoperative recovery  Instructions for follow-up, activity and diet:	WOUND CARE:   BATHING: Please do not submerge wound underwater. You may shower and/or sponge bathe.  ACTIVITY: No heavy lifting or straining. Otherwise, you may return to your usual level of physical activity. If you are taking narcotic pain medication (such as Percocet), do NOT drive a car, operate machinery or make important decisions.  DIET: Return to your usual diet.  NOTIFY YOUR SURGEON IF: You have any bleeding that does not stop, any pus draining from your wound, any fever (over 100.4 F) or chills, persistent nausea/vomiting, persistent diarrhea, or if your pain is not controlled on your discharge pain medications.  FOLLOW-UP:  1. Please call to make a follow-up appointment within one week of discharge   2. Please follow up with your primary care physician in one week regarding your hospitalization. Principal Discharge DX:	Colon polyp  Goal:	return to daily living activity prior to surgery, postoperative recovery  Instructions for follow-up, activity and diet:	WOUND CARE: Staples will be removed at follow up office visit.    BATHING: Please do not submerge wound underwater. You may shower and/or sponge bathe.  ACTIVITY: No heavy lifting or straining. Otherwise, you may return to your usual level of physical activity. If you are taking narcotic pain medication (such as Percocet), do NOT drive a car, operate machinery or make important decisions.  DIET: Low residue diet  NOTIFY YOUR SURGEON IF: You have any bleeding that does not stop, any pus draining from your wound, any fever (over 100.4 F) or chills, persistent nausea/vomiting, persistent diarrhea, or if your pain is not controlled on your discharge pain medications.  FOLLOW-UP:  1. Please call to make a follow-up appointment within one week of discharge with Dr. Cooley (See below for office information to call for an appointment)   2. Please follow up with your primary care physician in one week regarding your hospitalization.

## 2017-04-14 NOTE — DISCHARGE NOTE ADULT - MEDICATION SUMMARY - MEDICATIONS TO TAKE
I will START or STAY ON the medications listed below when I get home from the hospital:    enoxaparin 40 mg/0.4 mL injectable solution  -- 40 milligram(s) injectable once a day  -- It is very important that you take or use this exactly as directed.  Do not skip doses or discontinue unless directed by your doctor.    -- Indication: For Prevent clot    oxyCODONE 5 mg oral tablet  -- 1-2  tab(s) by mouth every 4 hours, As needed, Moderate Pain (4 - 6) MDD:6  -- Indication: For Pain    acetaminophen 325 mg oral tablet  -- 2 tab(s) by mouth every 6 hours, As needed, Mild Pain (1 - 3) and/or headache  -- Indication: For Pain    aluminum hydroxide-magnesium hydroxide 200 mg-200 mg/5 mL oral suspension  -- 30 milliliter(s) by mouth every 6 hours, As needed, Dyspepsia  -- Indication: For reflux    disulfiram 250 mg oral tablet  -- 1 tab(s) by mouth once a day in the morning  -- Indication: For alcohol abuse    disulfiram 250 mg oral tablet  -- 1 tab(s) by mouth once a day  -- Indication: For alcohol abuse    docusate sodium 100 mg oral capsule  -- 1 cap(s) by mouth 3 times a day  -- Indication: For Prevent constipation     Protonix 40 mg oral delayed release tablet  -- 1 tab(s) by mouth once a day  -- Indication: For reflux    multivitamin  --   once a day  -- Indication: For Supplement I will START or STAY ON the medications listed below when I get home from the hospital:    enoxaparin 40 mg/0.4 mL injectable solution  -- 40 milligram(s) injectable once a day  -- It is very important that you take or use this exactly as directed.  Do not skip doses or discontinue unless directed by your doctor.    -- Indication: For Prevent clot    oxyCODONE 5 mg oral tablet  -- 1-2  tab(s) by mouth every 4 hours, As needed, Moderate Pain (4 - 6) MDD:6  -- Indication: For Pain    acetaminophen 325 mg oral tablet  -- 2 tab(s) by mouth every 6 hours, As needed, Mild Pain (1 - 3) and/or headache  -- Indication: For Pain    disulfiram 250 mg oral tablet  -- 1 tab(s) by mouth once a day in the morning  -- Indication: For alcohol abuse    disulfiram 250 mg oral tablet  -- 1 tab(s) by mouth once a day  -- Indication: For alcohol abuse    docusate sodium 100 mg oral capsule  -- 1 cap(s) by mouth 3 times a day  -- Indication: For Prevent constipation     aluminum hydroxide/diphenhydrAMINE/lidocaine/MgOH/simethicone mucous membrane suspension  -- 30 milliliter(s) mucous membrane 3 times a day, As Needed for 7 days  -- Indication: For oral sores    Protonix 40 mg oral delayed release tablet  -- 1 tab(s) by mouth once a day  -- Indication: For reflux    multivitamin  --   once a day  -- Indication: For Supplement

## 2017-04-14 NOTE — DISCHARGE NOTE ADULT - CARE PROVIDERS DIRECT ADDRESSES
[email or web address]

## 2017-04-14 NOTE — PROVIDER CONTACT NOTE (OTHER) - RECOMMENDATIONS
recommend MICU transfer
recommend Md come assess pt.
Blood cultures x 2, tylenol for febrile temperature and CT scan.
MD Tovar notified
Throat lozenger  was given

## 2017-04-14 NOTE — DISCHARGE NOTE ADULT - PATIENT PORTAL LINK FT
“You can access the Follow[facility] Patient Portal, offered by [facility], by registering with the following website: [email or web address]”

## 2017-04-15 LAB
CULTURE RESULTS: SIGNIFICANT CHANGE UP
CULTURE RESULTS: SIGNIFICANT CHANGE UP
SPECIMEN SOURCE: SIGNIFICANT CHANGE UP
SPECIMEN SOURCE: SIGNIFICANT CHANGE UP

## 2017-04-18 ENCOUNTER — TRANSCRIPTION ENCOUNTER (OUTPATIENT)
Age: 57
End: 2017-04-18

## 2017-04-27 ENCOUNTER — APPOINTMENT (OUTPATIENT)
Dept: SURGICAL ONCOLOGY | Facility: CLINIC | Age: 57
End: 2017-04-27

## 2017-04-27 VITALS
HEART RATE: 87 BPM | BODY MASS INDEX: 25.87 KG/M2 | WEIGHT: 191 LBS | RESPIRATION RATE: 14 BRPM | DIASTOLIC BLOOD PRESSURE: 84 MMHG | TEMPERATURE: 98.2 F | HEIGHT: 72 IN | SYSTOLIC BLOOD PRESSURE: 128 MMHG

## 2017-05-16 ENCOUNTER — EMERGENCY (EMERGENCY)
Facility: HOSPITAL | Age: 57
LOS: 1 days | Discharge: HOME | End: 2017-05-16
Attending: EMERGENCY MEDICINE | Admitting: EMERGENCY MEDICINE
Payer: MEDICAID

## 2017-05-16 VITALS
OXYGEN SATURATION: 97 % | TEMPERATURE: 98 F | HEART RATE: 113 BPM | SYSTOLIC BLOOD PRESSURE: 140 MMHG | DIASTOLIC BLOOD PRESSURE: 94 MMHG

## 2017-05-16 DIAGNOSIS — K63.5 POLYP OF COLON: Chronic | ICD-10-CM

## 2017-05-16 DIAGNOSIS — F10.129 ALCOHOL ABUSE WITH INTOXICATION, UNSPECIFIED: ICD-10-CM

## 2017-05-16 DIAGNOSIS — N46.9 MALE INFERTILITY, UNSPECIFIED: Chronic | ICD-10-CM

## 2017-05-16 LAB
ALBUMIN SERPL ELPH-MCNC: 4.4 G/DL — SIGNIFICANT CHANGE UP (ref 3.3–5)
ALP SERPL-CCNC: 80 U/L — SIGNIFICANT CHANGE UP (ref 40–120)
ALT FLD-CCNC: 16 U/L RC — SIGNIFICANT CHANGE UP (ref 10–45)
ANION GAP SERPL CALC-SCNC: 21 MMOL/L — HIGH (ref 5–17)
AST SERPL-CCNC: 16 U/L — SIGNIFICANT CHANGE UP (ref 10–40)
BASOPHILS # BLD AUTO: 0.1 K/UL — SIGNIFICANT CHANGE UP (ref 0–0.2)
BASOPHILS NFR BLD AUTO: 0.5 % — SIGNIFICANT CHANGE UP (ref 0–2)
BILIRUB DIRECT SERPL-MCNC: 0.1 MG/DL — SIGNIFICANT CHANGE UP (ref 0–0.2)
BILIRUB INDIRECT FLD-MCNC: 0.2 MG/DL — SIGNIFICANT CHANGE UP (ref 0.2–1)
BILIRUB SERPL-MCNC: 0.3 MG/DL — SIGNIFICANT CHANGE UP (ref 0.2–1.2)
BUN SERPL-MCNC: 15 MG/DL — SIGNIFICANT CHANGE UP (ref 7–23)
CALCIUM SERPL-MCNC: 8.9 MG/DL — SIGNIFICANT CHANGE UP (ref 8.4–10.5)
CHLORIDE SERPL-SCNC: 101 MMOL/L — SIGNIFICANT CHANGE UP (ref 96–108)
CO2 SERPL-SCNC: 22 MMOL/L — SIGNIFICANT CHANGE UP (ref 22–31)
CREAT SERPL-MCNC: 1.09 MG/DL — SIGNIFICANT CHANGE UP (ref 0.5–1.3)
EOSINOPHIL # BLD AUTO: 0.1 K/UL — SIGNIFICANT CHANGE UP (ref 0–0.5)
EOSINOPHIL NFR BLD AUTO: 0.5 % — SIGNIFICANT CHANGE UP (ref 0–6)
ETHANOL SERPL-MCNC: 154 MG/DL — HIGH (ref 0–10)
ETHANOL SERPL-MCNC: 343 MG/DL — HIGH (ref 0–10)
GLUCOSE SERPL-MCNC: 142 MG/DL — HIGH (ref 70–99)
HCT VFR BLD CALC: 43.6 % — SIGNIFICANT CHANGE UP (ref 39–50)
HGB BLD-MCNC: 15.2 G/DL — SIGNIFICANT CHANGE UP (ref 13–17)
LYMPHOCYTES # BLD AUTO: 3.7 K/UL — HIGH (ref 1–3.3)
LYMPHOCYTES # BLD AUTO: 34.5 % — SIGNIFICANT CHANGE UP (ref 13–44)
MAGNESIUM SERPL-MCNC: 2 MG/DL — SIGNIFICANT CHANGE UP (ref 1.6–2.6)
MCHC RBC-ENTMCNC: 33 PG — SIGNIFICANT CHANGE UP (ref 27–34)
MCHC RBC-ENTMCNC: 34.9 GM/DL — SIGNIFICANT CHANGE UP (ref 32–36)
MCV RBC AUTO: 94.3 FL — SIGNIFICANT CHANGE UP (ref 80–100)
MONOCYTES # BLD AUTO: 0.4 K/UL — SIGNIFICANT CHANGE UP (ref 0–0.9)
MONOCYTES NFR BLD AUTO: 3.9 % — SIGNIFICANT CHANGE UP (ref 2–14)
NEUTROPHILS # BLD AUTO: 6.6 K/UL — SIGNIFICANT CHANGE UP (ref 1.8–7.4)
NEUTROPHILS NFR BLD AUTO: 60.6 % — SIGNIFICANT CHANGE UP (ref 43–77)
PHOSPHATE SERPL-MCNC: 2.2 MG/DL — LOW (ref 2.5–4.5)
PLATELET # BLD AUTO: 340 K/UL — SIGNIFICANT CHANGE UP (ref 150–400)
POTASSIUM SERPL-MCNC: 3.9 MMOL/L — SIGNIFICANT CHANGE UP (ref 3.5–5.3)
POTASSIUM SERPL-SCNC: 3.9 MMOL/L — SIGNIFICANT CHANGE UP (ref 3.5–5.3)
PROT SERPL-MCNC: 7.2 G/DL — SIGNIFICANT CHANGE UP (ref 6–8.3)
RBC # BLD: 4.62 M/UL — SIGNIFICANT CHANGE UP (ref 4.2–5.8)
RBC # FLD: 12.9 % — SIGNIFICANT CHANGE UP (ref 10.3–14.5)
SODIUM SERPL-SCNC: 144 MMOL/L — SIGNIFICANT CHANGE UP (ref 135–145)
WBC # BLD: 10.8 K/UL — HIGH (ref 3.8–10.5)
WBC # FLD AUTO: 10.8 K/UL — HIGH (ref 3.8–10.5)

## 2017-05-16 PROCEDURE — 99285 EMERGENCY DEPT VISIT HI MDM: CPT | Mod: 25

## 2017-05-16 PROCEDURE — 85027 COMPLETE CBC AUTOMATED: CPT

## 2017-05-16 PROCEDURE — 96374 THER/PROPH/DIAG INJ IV PUSH: CPT

## 2017-05-16 PROCEDURE — 93010 ELECTROCARDIOGRAM REPORT: CPT

## 2017-05-16 PROCEDURE — 80048 BASIC METABOLIC PNL TOTAL CA: CPT

## 2017-05-16 PROCEDURE — 83735 ASSAY OF MAGNESIUM: CPT

## 2017-05-16 PROCEDURE — 70450 CT HEAD/BRAIN W/O DYE: CPT

## 2017-05-16 PROCEDURE — 84100 ASSAY OF PHOSPHORUS: CPT

## 2017-05-16 PROCEDURE — 93005 ELECTROCARDIOGRAM TRACING: CPT

## 2017-05-16 PROCEDURE — 70450 CT HEAD/BRAIN W/O DYE: CPT | Mod: 26

## 2017-05-16 PROCEDURE — 80076 HEPATIC FUNCTION PANEL: CPT

## 2017-05-16 PROCEDURE — 99284 EMERGENCY DEPT VISIT MOD MDM: CPT | Mod: 25

## 2017-05-16 PROCEDURE — 80307 DRUG TEST PRSMV CHEM ANLYZR: CPT

## 2017-05-16 PROCEDURE — 96375 TX/PRO/DX INJ NEW DRUG ADDON: CPT

## 2017-05-16 RX ORDER — SODIUM CHLORIDE 9 MG/ML
1000 INJECTION, SOLUTION INTRAVENOUS
Qty: 0 | Refills: 0 | Status: DISCONTINUED | OUTPATIENT
Start: 2017-05-16 | End: 2017-05-16

## 2017-05-16 RX ORDER — HALOPERIDOL DECANOATE 100 MG/ML
5 INJECTION INTRAMUSCULAR ONCE
Qty: 0 | Refills: 0 | Status: COMPLETED | OUTPATIENT
Start: 2017-05-16 | End: 2017-05-16

## 2017-05-16 RX ORDER — SODIUM CHLORIDE 9 MG/ML
1000 INJECTION, SOLUTION INTRAVENOUS
Qty: 0 | Refills: 0 | Status: COMPLETED | OUTPATIENT
Start: 2017-05-16 | End: 2017-05-16

## 2017-05-16 RX ADMIN — SODIUM CHLORIDE 1000 MILLILITER(S): 9 INJECTION, SOLUTION INTRAVENOUS at 15:04

## 2017-05-16 RX ADMIN — HALOPERIDOL DECANOATE 5 MILLIGRAM(S): 100 INJECTION INTRAMUSCULAR at 14:15

## 2017-05-16 RX ADMIN — Medication 2 MILLIGRAM(S): at 14:15

## 2017-05-16 NOTE — ED PROVIDER NOTE - OBJECTIVE STATEMENT
Patient is 57 y M with PMH htn, ETOH, colon polyp presenting with intoxication, dad found him passed out in the basement he lives in, called 911, per father patient made "veiled threats about hurting himself" and [place]" but has no history of suicide attempt. Recently d/cd from rehab program in the [facility], was supposed to f/u at a local program but did not. Per father drinks etoh and uses marijuana, last drink was "earlier today" per patient. Remembers passing out this morning after drinking a pint of vodka last night, denies drinking every day states he quit drinking for 6 days and then started again 2 days ago, denies SI/HI, endorses some depression. Denies pain. Was previously taking antabuse, quit 60 days ago.    PMD:  ROS: Denies fever, palpitations, chills, recent sickness, HA, vision changes, cough, SOB, chest pain, abdominal pain, n/v/d/c, dysuria, hematuria, rash, new joint aches, sick contacts, and recent travel.

## 2017-05-16 NOTE — ED ADULT NURSE NOTE - OBJECTIVE STATEMENT
Pt was found intoxicated on floor at home as per family.  Pt AA and Ox2 at this time.  (+)Intoxicated with slurred speech noted intermittently.  Pt denies cp, ha, or sob.  Pt denies visual or auditory hallucinations.  Pt speaking in complete sentences at this time. No tremors noted at this time.

## 2017-05-16 NOTE — ED PROVIDER NOTE - MEDICAL DECISION MAKING DETAILS
Pt with h/o alcohol abuse has been drinking also mentioned SI to hid father unable to do detailed psych eval initially 2/2 intoxication will monitor 1.1 obs prevent withdrawal iv hydration re eval

## 2017-05-16 NOTE — ED PROVIDER NOTE - PHYSICAL EXAMINATION
Gen: NAD, AOx3  Head: NCAT  HEENT: PERRL, oral mucosa moist, normal conjunctiva  Lung: CTAB, no respiratory distress  CV: rrr, no murmurs, Normal perfusion  Abd: soft, NTND, no CVA tenderness  MSK: No edema, no visible deformities  Neuro: No focal neurologic deficits  Skin: No rash   Psych: normal affect Gen: NAD, AOx3, intermittently compliant with exam  Head: NCAT  HEENT: PERRL, oral mucosa moist, normal conjunctiva  Lung: CTAB, no respiratory distress  CV: regular tachy, no murmurs, Normal perfusion  Abd: soft, NTND, no CVA tenderness  MSK: No edema, no visible deformities, entire spine without midline tenderness and with full ROM, no paravertebral tenderness, no stepoffs or masses, negative straight leg raise bilaterally   Neuro: No focal neurologic deficits, motor and sensation intact, no tremor or past pointing  Skin: No rash, avulsion tear to left pretibial surface  Psych: normal affect

## 2017-05-16 NOTE — ED PROVIDER NOTE - PMH
Alcohol abuse  relapse and last drink was 3-14-17. Had been sober for 168 days prior to drink on 3-14-17  Alcohol abuse    Colon polyp  multiple  Colon polyps    HTN (hypertension)    Hypertension  "labile" -- not on medication as of 30-22-17  Snoring    Snoring  ANSON precautions -- responds affirmatively to STOP BANG questionnaire -- admits to loud snoring; age > 50; gender, male; elevated BP at PAST office

## 2017-05-16 NOTE — ED PROVIDER NOTE - ATTENDING CONTRIBUTION TO CARE
I have seen and evaluated this patient with the resident.   I agree with the findings  unless other wise stated.  I have made appropriate changes in documentations where needed, After my face to face bedside evaluation, I am further  noting: Pt with h/o alcohol abuse has been drinking also mentioned SI to hid father unable to do detailed psych eval initially 2/2 intoxication will monitor 1.1 obs prevent withdrawal iv hydration re eval

## 2017-05-16 NOTE — ED PROVIDER NOTE - PROGRESS NOTE DETAILS
Patient continues to deny SI/HI, states he feels depressed a few days out of the month but has no thoughts that he would like to die or walk into traffic as his father said earlier in the day. Awaiting repeat ETOH level, psych, SW Dr. Pitts Note: spoke to patient at length..pt not suicidal, states he said those things in anger, contracts for safety.  Spoke to his father, Justin, will  patient, detox centers given, will f/u detox within 3 days or get kicked out of father's place.  Pt knows to return to ER if feels in crisis.  Does not meet criteria for inpatient psych.  Stable for dc.

## 2017-05-16 NOTE — ED PROVIDER NOTE - PSH
Colon polyp  colonoscopy -- negative biopsies in 2017  Infertility, male, severe  vascular procedure for infertility  No significant past surgical history

## 2017-05-17 VITALS
DIASTOLIC BLOOD PRESSURE: 107 MMHG | HEART RATE: 104 BPM | OXYGEN SATURATION: 97 % | SYSTOLIC BLOOD PRESSURE: 147 MMHG | TEMPERATURE: 98 F | RESPIRATION RATE: 17 BRPM

## 2017-05-23 ENCOUNTER — APPOINTMENT (OUTPATIENT)
Dept: INTERNAL MEDICINE | Facility: CLINIC | Age: 57
End: 2017-05-23

## 2017-05-23 VITALS
DIASTOLIC BLOOD PRESSURE: 80 MMHG | HEART RATE: 90 BPM | HEIGHT: 72 IN | TEMPERATURE: 98.1 F | SYSTOLIC BLOOD PRESSURE: 120 MMHG | WEIGHT: 191 LBS | BODY MASS INDEX: 25.87 KG/M2

## 2017-05-23 DIAGNOSIS — Z01.818 ENCOUNTER FOR OTHER PREPROCEDURAL EXAMINATION: ICD-10-CM

## 2017-05-23 RX ORDER — POTASSIUM CHLORIDE 1500 MG/1
20 TABLET, FILM COATED, EXTENDED RELEASE ORAL AS DIRECTED
Qty: 4 | Refills: 0 | Status: DISCONTINUED | COMMUNITY
Start: 2017-02-02 | End: 2017-05-23

## 2017-05-23 RX ORDER — POTASSIUM CHLORIDE 1500 MG/1
20 TABLET, FILM COATED, EXTENDED RELEASE ORAL TWICE DAILY
Qty: 2 | Refills: 0 | Status: DISCONTINUED | COMMUNITY
Start: 2017-04-05 | End: 2017-05-23

## 2017-05-23 RX ORDER — POLYETHYLENE GLYCOL 3350, SODIUM SULFATE, SODIUM CHLORIDE, POTASSIUM CHLORIDE, ASCORBIC ACID, SODIUM ASCORBATE 7.5-2.691G
100 KIT ORAL
Qty: 1 | Refills: 0 | Status: DISCONTINUED | COMMUNITY
Start: 2017-02-02 | End: 2017-05-23

## 2017-05-25 ENCOUNTER — APPOINTMENT (OUTPATIENT)
Dept: UROLOGY | Facility: CLINIC | Age: 57
End: 2017-05-25

## 2017-05-28 LAB
PSA FREE FLD-MCNC: 13.4
PSA FREE SERPL-MCNC: 0.39 NG/ML
PSA SERPL-MCNC: 2.92 NG/ML

## 2017-07-17 ENCOUNTER — APPOINTMENT (OUTPATIENT)
Dept: SURGICAL ONCOLOGY | Facility: CLINIC | Age: 57
End: 2017-07-17

## 2017-08-21 ENCOUNTER — APPOINTMENT (OUTPATIENT)
Dept: INTERNAL MEDICINE | Facility: CLINIC | Age: 57
End: 2017-08-21

## 2017-09-22 ENCOUNTER — INPATIENT (INPATIENT)
Facility: HOSPITAL | Age: 57
LOS: 2 days | Discharge: HOME | DRG: 897 | End: 2017-09-25
Attending: STUDENT IN AN ORGANIZED HEALTH CARE EDUCATION/TRAINING PROGRAM | Admitting: INTERNAL MEDICINE
Payer: MEDICAID

## 2017-09-22 VITALS — OXYGEN SATURATION: 96 % | DIASTOLIC BLOOD PRESSURE: 104 MMHG | HEART RATE: 126 BPM | SYSTOLIC BLOOD PRESSURE: 145 MMHG

## 2017-09-22 DIAGNOSIS — R56.9 UNSPECIFIED CONVULSIONS: ICD-10-CM

## 2017-09-22 DIAGNOSIS — N46.9 MALE INFERTILITY, UNSPECIFIED: Chronic | ICD-10-CM

## 2017-09-22 DIAGNOSIS — F10.239 ALCOHOL DEPENDENCE WITH WITHDRAWAL, UNSPECIFIED: ICD-10-CM

## 2017-09-22 DIAGNOSIS — K63.5 POLYP OF COLON: Chronic | ICD-10-CM

## 2017-09-22 LAB
ALBUMIN SERPL ELPH-MCNC: 4.4 G/DL — SIGNIFICANT CHANGE UP (ref 3.3–5)
ALP SERPL-CCNC: 92 U/L — SIGNIFICANT CHANGE UP (ref 40–120)
ALT FLD-CCNC: 21 U/L RC — SIGNIFICANT CHANGE UP (ref 10–45)
ANION GAP SERPL CALC-SCNC: 15 MMOL/L — SIGNIFICANT CHANGE UP (ref 5–17)
ANION GAP SERPL CALC-SCNC: 24 MMOL/L — HIGH (ref 5–17)
AST SERPL-CCNC: 25 U/L — SIGNIFICANT CHANGE UP (ref 10–40)
BASOPHILS # BLD AUTO: 0.1 K/UL — SIGNIFICANT CHANGE UP (ref 0–0.2)
BASOPHILS NFR BLD AUTO: 0.8 % — SIGNIFICANT CHANGE UP (ref 0–2)
BILIRUB SERPL-MCNC: 1 MG/DL — SIGNIFICANT CHANGE UP (ref 0.2–1.2)
BUN SERPL-MCNC: 14 MG/DL — SIGNIFICANT CHANGE UP (ref 7–23)
BUN SERPL-MCNC: 17 MG/DL — SIGNIFICANT CHANGE UP (ref 7–23)
CALCIUM SERPL-MCNC: 10 MG/DL — SIGNIFICANT CHANGE UP (ref 8.4–10.5)
CALCIUM SERPL-MCNC: 8.5 MG/DL — SIGNIFICANT CHANGE UP (ref 8.4–10.5)
CHLORIDE SERPL-SCNC: 94 MMOL/L — LOW (ref 96–108)
CHLORIDE SERPL-SCNC: 98 MMOL/L — SIGNIFICANT CHANGE UP (ref 96–108)
CO2 SERPL-SCNC: 19 MMOL/L — LOW (ref 22–31)
CO2 SERPL-SCNC: 22 MMOL/L — SIGNIFICANT CHANGE UP (ref 22–31)
CREAT SERPL-MCNC: 0.81 MG/DL — SIGNIFICANT CHANGE UP (ref 0.5–1.3)
CREAT SERPL-MCNC: 1.16 MG/DL — SIGNIFICANT CHANGE UP (ref 0.5–1.3)
EOSINOPHIL # BLD AUTO: 0.2 K/UL — SIGNIFICANT CHANGE UP (ref 0–0.5)
EOSINOPHIL NFR BLD AUTO: 1.6 % — SIGNIFICANT CHANGE UP (ref 0–6)
ETHANOL SERPL-MCNC: SIGNIFICANT CHANGE UP MG/DL (ref 0–10)
GAS PNL BLDV: SIGNIFICANT CHANGE UP
GLUCOSE SERPL-MCNC: 103 MG/DL — HIGH (ref 70–99)
GLUCOSE SERPL-MCNC: 207 MG/DL — HIGH (ref 70–99)
HCT VFR BLD CALC: 42.4 % — SIGNIFICANT CHANGE UP (ref 39–50)
HGB BLD-MCNC: 14.9 G/DL — SIGNIFICANT CHANGE UP (ref 13–17)
LACTATE SERPL-SCNC: 0.8 MMOL/L — SIGNIFICANT CHANGE UP (ref 0.7–2)
LYMPHOCYTES # BLD AUTO: 2.8 K/UL — SIGNIFICANT CHANGE UP (ref 1–3.3)
LYMPHOCYTES # BLD AUTO: 28.1 % — SIGNIFICANT CHANGE UP (ref 13–44)
MAGNESIUM SERPL-MCNC: 2 MG/DL — SIGNIFICANT CHANGE UP (ref 1.6–2.6)
MAGNESIUM SERPL-MCNC: 2.2 MG/DL — SIGNIFICANT CHANGE UP (ref 1.6–2.6)
MCHC RBC-ENTMCNC: 33.7 PG — SIGNIFICANT CHANGE UP (ref 27–34)
MCHC RBC-ENTMCNC: 35.2 GM/DL — SIGNIFICANT CHANGE UP (ref 32–36)
MCV RBC AUTO: 95.7 FL — SIGNIFICANT CHANGE UP (ref 80–100)
MONOCYTES # BLD AUTO: 0.8 K/UL — SIGNIFICANT CHANGE UP (ref 0–0.9)
MONOCYTES NFR BLD AUTO: 7.8 % — SIGNIFICANT CHANGE UP (ref 2–14)
NEUTROPHILS # BLD AUTO: 6.3 K/UL — SIGNIFICANT CHANGE UP (ref 1.8–7.4)
NEUTROPHILS NFR BLD AUTO: 61.7 % — SIGNIFICANT CHANGE UP (ref 43–77)
PCP SPEC-MCNC: SIGNIFICANT CHANGE UP
PHOSPHATE SERPL-MCNC: 2.2 MG/DL — LOW (ref 2.5–4.5)
PHOSPHATE SERPL-MCNC: 2.5 MG/DL — SIGNIFICANT CHANGE UP (ref 2.5–4.5)
PLATELET # BLD AUTO: 380 K/UL — SIGNIFICANT CHANGE UP (ref 150–400)
POTASSIUM SERPL-MCNC: 4 MMOL/L — SIGNIFICANT CHANGE UP (ref 3.5–5.3)
POTASSIUM SERPL-MCNC: 4.1 MMOL/L — SIGNIFICANT CHANGE UP (ref 3.5–5.3)
POTASSIUM SERPL-SCNC: 4 MMOL/L — SIGNIFICANT CHANGE UP (ref 3.5–5.3)
POTASSIUM SERPL-SCNC: 4.1 MMOL/L — SIGNIFICANT CHANGE UP (ref 3.5–5.3)
PROT SERPL-MCNC: 7.3 G/DL — SIGNIFICANT CHANGE UP (ref 6–8.3)
RBC # BLD: 4.43 M/UL — SIGNIFICANT CHANGE UP (ref 4.2–5.8)
RBC # FLD: 14 % — SIGNIFICANT CHANGE UP (ref 10.3–14.5)
SODIUM SERPL-SCNC: 135 MMOL/L — SIGNIFICANT CHANGE UP (ref 135–145)
SODIUM SERPL-SCNC: 137 MMOL/L — SIGNIFICANT CHANGE UP (ref 135–145)
TSH SERPL-MCNC: 4.07 UIU/ML — SIGNIFICANT CHANGE UP (ref 0.27–4.2)
WBC # BLD: 10.2 K/UL — SIGNIFICANT CHANGE UP (ref 3.8–10.5)
WBC # FLD AUTO: 10.2 K/UL — SIGNIFICANT CHANGE UP (ref 3.8–10.5)

## 2017-09-22 PROCEDURE — 93010 ELECTROCARDIOGRAM REPORT: CPT

## 2017-09-22 PROCEDURE — 99285 EMERGENCY DEPT VISIT HI MDM: CPT | Mod: 25

## 2017-09-22 PROCEDURE — 70450 CT HEAD/BRAIN W/O DYE: CPT | Mod: 26

## 2017-09-22 PROCEDURE — 99223 1ST HOSP IP/OBS HIGH 75: CPT

## 2017-09-22 RX ORDER — SODIUM CHLORIDE 9 MG/ML
1000 INJECTION INTRAMUSCULAR; INTRAVENOUS; SUBCUTANEOUS ONCE
Qty: 0 | Refills: 0 | Status: COMPLETED | OUTPATIENT
Start: 2017-09-22 | End: 2017-09-22

## 2017-09-22 RX ORDER — HYDRALAZINE HCL 50 MG
10 TABLET ORAL ONCE
Qty: 0 | Refills: 0 | Status: COMPLETED | OUTPATIENT
Start: 2017-09-22 | End: 2017-09-22

## 2017-09-22 RX ORDER — POTASSIUM PHOSPHATE, MONOBASIC POTASSIUM PHOSPHATE, DIBASIC 236; 224 MG/ML; MG/ML
15 INJECTION, SOLUTION INTRAVENOUS ONCE
Qty: 0 | Refills: 0 | Status: COMPLETED | OUTPATIENT
Start: 2017-09-22 | End: 2017-09-22

## 2017-09-22 RX ORDER — DISULFIRAM 500 MG
1 TABLET ORAL
Qty: 0 | Refills: 0 | COMMUNITY

## 2017-09-22 RX ORDER — INFLUENZA VIRUS VACCINE 15; 15; 15; 15 UG/.5ML; UG/.5ML; UG/.5ML; UG/.5ML
0.5 SUSPENSION INTRAMUSCULAR ONCE
Qty: 0 | Refills: 0 | Status: COMPLETED | OUTPATIENT
Start: 2017-09-22 | End: 2017-09-25

## 2017-09-22 RX ORDER — SODIUM CHLORIDE 9 MG/ML
1000 INJECTION, SOLUTION INTRAVENOUS
Qty: 0 | Refills: 0 | Status: DISCONTINUED | OUTPATIENT
Start: 2017-09-22 | End: 2017-09-24

## 2017-09-22 RX ORDER — FOLIC ACID 0.8 MG
1 TABLET ORAL DAILY
Qty: 0 | Refills: 0 | Status: DISCONTINUED | OUTPATIENT
Start: 2017-09-22 | End: 2017-09-25

## 2017-09-22 RX ORDER — THIAMINE MONONITRATE (VIT B1) 100 MG
100 TABLET ORAL DAILY
Qty: 0 | Refills: 0 | Status: COMPLETED | OUTPATIENT
Start: 2017-09-22 | End: 2017-09-24

## 2017-09-22 RX ADMIN — Medication 1 MILLIGRAM(S): at 04:03

## 2017-09-22 RX ADMIN — Medication 1 TABLET(S): at 15:08

## 2017-09-22 RX ADMIN — Medication 2 MILLIGRAM(S): at 20:07

## 2017-09-22 RX ADMIN — POTASSIUM PHOSPHATE, MONOBASIC POTASSIUM PHOSPHATE, DIBASIC 62.5 MILLIMOLE(S): 236; 224 INJECTION, SOLUTION INTRAVENOUS at 05:49

## 2017-09-22 RX ADMIN — Medication 2 MILLIGRAM(S): at 10:21

## 2017-09-22 RX ADMIN — Medication 2 MILLIGRAM(S): at 16:40

## 2017-09-22 RX ADMIN — SODIUM CHLORIDE 2000 MILLILITER(S): 9 INJECTION INTRAMUSCULAR; INTRAVENOUS; SUBCUTANEOUS at 03:19

## 2017-09-22 RX ADMIN — SODIUM CHLORIDE 75 MILLILITER(S): 9 INJECTION, SOLUTION INTRAVENOUS at 05:49

## 2017-09-22 RX ADMIN — Medication 10 MILLIGRAM(S): at 12:17

## 2017-09-22 RX ADMIN — Medication 2 MILLIGRAM(S): at 08:21

## 2017-09-22 RX ADMIN — Medication 100 MILLIGRAM(S): at 15:08

## 2017-09-22 RX ADMIN — Medication 2 MILLIGRAM(S): at 15:07

## 2017-09-22 RX ADMIN — SODIUM CHLORIDE 1000 MILLILITER(S): 9 INJECTION INTRAMUSCULAR; INTRAVENOUS; SUBCUTANEOUS at 05:10

## 2017-09-22 RX ADMIN — Medication 2 MILLIGRAM(S): at 12:53

## 2017-09-22 RX ADMIN — Medication 1 MILLIGRAM(S): at 15:07

## 2017-09-22 RX ADMIN — Medication 1 MILLIGRAM(S): at 06:34

## 2017-09-22 NOTE — ED PROVIDER NOTE - PROGRESS NOTE DETAILS
Pt states he drank a Pint a day of alcohol for the past week did not drink today. Concern for alcohol withdrawal seizures. Lactate 7.2 likely secondary to seizure.

## 2017-09-22 NOTE — H&P ADULT - PROBLEM SELECTOR PLAN 1
We don't yet have a description of the seizure.   Given his history of trying to cut down on alcohol use yesterday and a negative CT head, this is likely due to alcohol withdrawal.  Seizure precautions. Monitoring symptoms of CIWA protocol.

## 2017-09-22 NOTE — H&P ADULT - NSHPLABSRESULTS_GEN_ALL_CORE
Labs reviewed: No leukocytosis, no anemia, platelets okays, high anion gap acidosis AG 24, lactate 7.7 (high), EtOH level negative.    CT head personally reviewed: No acute findings, no masses/bleeding

## 2017-09-22 NOTE — ED PROVIDER NOTE - CONSTITUTIONAL, MLM
normal... Well appearing, well nourished, awake, alert, oriented to person, place, time/situation and in no apparent distress. awake, alert, oriented to person, place, time/situation slightly confused and in no apparent distress.

## 2017-09-22 NOTE — H&P ADULT - ASSESSMENT
58yo man with PMH of alcoholism (no prior seizures during withdrawals), h/o right hemicolectomy in April 2017 due to polyps with post-op alcohol withdrawal and an episode of atrial fibrillation with RVR, presents today from home s/p a seizure episode.

## 2017-09-22 NOTE — ED PROVIDER NOTE - ATTENDING CONTRIBUTION TO CARE
57 yom pmhx heavy alcohol abuse in past, presents after witnessed tonic clonic seizure in bed today. witnessed by pt's parents who state episode lasted aprox 1 min then was post ictal x15 min. in ED pt unable to recall details of what happened but states "I feel better now." no prior sz d/o or hx of prior sz in past. per father at bedside, pt is living in their home and feels pt has not been drinking recently  - states he's been "sober" for 3 months. pt denies any recent illnesses or pain.     ROS:   constitutional - no fever, no chills  eyes - no visual changes, no redness  eent - no sore throat, no nasal congestion  cvs - no chest pain, no leg swelling  resp - no shortness of breath, no cough  gi - no abdominal pain, no vomiting, no diarrhea  gu - no dysuria, no hematuria  msk - no acute back pain, no joint swelling  skin - no rashes, no jaundice  neuro - no headache, no focal weakness, + sz  psych - no acute mental health issue     Physical Exam:   constitutional - well appearing, awake and alert, oriented x2, family at bedside states pt is mildly more confused than baseline  head - no external evidence of trauma  cvs - sinus tach, no murmurs, no peripheral edema  resp - breath sounds clear and equal bilat  gi - abdomen soft and nontender, no rigidity, guarding or rebound, bowel sounds present  msk - moving all extremities spontaneously  neuro - alert and oriented x2, no focal deficits, CNs 2-12 grossly intact  skin- no jaundice, warm and dry  psych - mood and affect wnl, no apparent risk to self or others     after initial exam pt admitted to ED staff and parents that he had been drinking very heavily over last week but has not had anything to drink x24 hrs. concern for etoh withdrawal - fluids and benzos initiated, ct neg for intracranial pathology, will admit for withdrawal sz vs seizure of other etiology. RUDDY Bragg MD

## 2017-09-22 NOTE — ED ADULT NURSE REASSESSMENT NOTE - NS ED NURSE REASSESS COMMENT FT1
md alva aware of ciwa score.
md aware of pressure, states no interventions at this time.
pt out to ct.
pt medicated as per md cobian. safety maintained. md aware of vitals. pt admitted, waiting for bed upstairs. pt denies pain at this time. pt aox3, asking repetitive questions. pt now states "I last drank alcohol one day ago it was a pint of vodka my family does not know".  will continue to monitor.

## 2017-09-22 NOTE — ED ADULT NURSE NOTE - OBJECTIVE STATEMENT
56 yo male pt with history of etoh abuse last drink was six months ago as per father, presents to ed via ambulance s/p seizure while in bed at home had witnessed tonic clonic seizure for one minute, post ictal lasted 15 minutes. as per pt's girlfriend "he was shaking his whole body and we couldn't wake him up after and when he finally woke up he was confused, and during the seizure he peed himself". upon arrival to ed pt is confused and agitated. pt states "I am fine I want to go home". pt alert and oriented to self and place only. pupils equal round and reactive to light. face symmetrical speech clear. pt follows some commands. pt asking repetitive questions. no arm or leg drift. slight tremors noted in upper extremities. no arm or leg drift. pt not diaphoretic. pt denies headache/vision changes/photophobia/numbness/tingling/n/v/chest pain/sob/weakness/dizziness at this time. breath sounds clear and equal bilaterally. abd nontender and nondistended. skin warm dry and intact. no bruising or deformities noted. pt denies pain of any kind. safety maintained, bed in lowest position family at bedside. as per pt's father "I am leaving call me for an emergency cell [phone], home- [phone]".

## 2017-09-22 NOTE — ED PROVIDER NOTE - OBJECTIVE STATEMENT
57 YOM pmh alcohol abuse, presents to ED s/p seizure while in bed at home then had full tonic clonic sz for 1 minute with post ictal confusion for 15 minutes. PT feels slightly agitated and confused now. No pmh of sz. Pt has not had a drink of alcohol for 3 months, pt is no longer taking Anti-abuse. Pt denies headaches, abd pain, n/v/d. +witnessed, + urinary incontinence.

## 2017-09-22 NOTE — ED PROVIDER NOTE - ENMT, MLM
Airway patent, Nasal mucosa clear. Mouth with normal mucosa. Throat has no vesicles, no oropharyngeal exudates and uvula is midline.  Tongue fasciculations present

## 2017-09-22 NOTE — H&P ADULT - NSHPSOCIALHISTORY_GEN_ALL_CORE
The patient lives with his girlfriend. 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  Denies smoking.  Admit to smoking marijuana.   Alcohol use: 2 pints of vodka daily. +CAGE questioner. Has gone to AA meetings in the past. Thinking about quitting.

## 2017-09-22 NOTE — H&P ADULT - HISTORY OF PRESENT ILLNESS
56yo man with PMH of alcoholism (no prior seizures during withdrawals), h/o right hemicolectomy in April 2017 due to polyps with post-op alcohol withdrawal and an episode of atrial fibrillation with RVR, presents today from home s/p a seizure episode. The patient states that he is a long time alcoholic, drinks about 2 pints of vodka daily, and has tried to self detox yesterday "only sipping about 1 pint throughout the day." The patient does not really remember the episode, but states that he got up last night to urinate and that's when his girlfriend witnessed him having a seizure. We have no more details of the seizure. Denies injuries. In the ED the patient complained of "brain blanks" for several minutes which have resolved now. Currently complaining of a headache. Vital signs in the ED: T 97.8, , /104, RR 20, 98% RA. 56yo man with PMH of alcoholism (no prior seizures during withdrawals), h/o right hemicolectomy in April 2017 due to polyps with post-op alcohol withdrawal and an episode of atrial fibrillation with RVR, presents today from home s/p a seizure episode. The patient states that he is a long time alcoholic, drinks about 2 pints of vodka daily, and has tried to self detox yesterday "only sipping about 1 pint throughout the day." The patient does not really remember the episode, but states that he got up last night to urinate and that's when his girlfriend witnessed him having a seizure. We have no more details of the seizure. Denies injuries. In the ED the patient complained of "brain blanks" for several minutes which have resolved now. Currently complaining of a headache. Vital signs in the ED: T 97.8, , /104, RR 20, 98% RA. He was given ativan 1mg IV x2, and admitted to medicine.

## 2017-09-22 NOTE — H&P ADULT - PROBLEM SELECTOR PLAN 2
High Risk CIWA protocol.  Start Ativan 2mg IV q4h (with taper over the next few days).  Ativan IV and PO made available PRN per CIWA scores.  Low threshold for MICU consult if his CIWA scores climb.   SW consult High Risk CIWA protocol.  Start Ativan 2mg IV q4h (with taper over the next few days).  Ativan IV and PO made available PRN per CIWA scores.  Low threshold for MICU consult if his CIWA scores climb.   Being given Banana bag now for IV hydration.   Start MVI, thiamine and folate.  SW consult

## 2017-09-23 LAB
ALBUMIN SERPL ELPH-MCNC: 3.9 G/DL — SIGNIFICANT CHANGE UP (ref 3.3–5)
ALP SERPL-CCNC: 80 U/L — SIGNIFICANT CHANGE UP (ref 40–120)
ALT FLD-CCNC: 18 U/L — SIGNIFICANT CHANGE UP (ref 10–45)
ANION GAP SERPL CALC-SCNC: 14 MMOL/L — SIGNIFICANT CHANGE UP (ref 5–17)
AST SERPL-CCNC: 29 U/L — SIGNIFICANT CHANGE UP (ref 10–40)
BASOPHILS # BLD AUTO: 0.04 K/UL — SIGNIFICANT CHANGE UP (ref 0–0.2)
BASOPHILS NFR BLD AUTO: 0.6 % — SIGNIFICANT CHANGE UP (ref 0–2)
BILIRUB SERPL-MCNC: 1 MG/DL — SIGNIFICANT CHANGE UP (ref 0.2–1.2)
BUN SERPL-MCNC: 13 MG/DL — SIGNIFICANT CHANGE UP (ref 7–23)
CALCIUM SERPL-MCNC: 9.4 MG/DL — SIGNIFICANT CHANGE UP (ref 8.4–10.5)
CHLORIDE SERPL-SCNC: 102 MMOL/L — SIGNIFICANT CHANGE UP (ref 96–108)
CO2 SERPL-SCNC: 22 MMOL/L — SIGNIFICANT CHANGE UP (ref 22–31)
CREAT SERPL-MCNC: 0.93 MG/DL — SIGNIFICANT CHANGE UP (ref 0.5–1.3)
EOSINOPHIL # BLD AUTO: 0.06 K/UL — SIGNIFICANT CHANGE UP (ref 0–0.5)
EOSINOPHIL NFR BLD AUTO: 0.9 % — SIGNIFICANT CHANGE UP (ref 0–6)
GLUCOSE SERPL-MCNC: 107 MG/DL — HIGH (ref 70–99)
HBA1C BLD-MCNC: 5.4 % — SIGNIFICANT CHANGE UP (ref 4–5.6)
HCT VFR BLD CALC: 38.9 % — LOW (ref 39–50)
HCV AB S/CO SERPL IA: 0.11 S/CO — SIGNIFICANT CHANGE UP
HCV AB SERPL-IMP: SIGNIFICANT CHANGE UP
HGB BLD-MCNC: 13.5 G/DL — SIGNIFICANT CHANGE UP (ref 13–17)
HIV 1+2 AB+HIV1 P24 AG SERPL QL IA: SIGNIFICANT CHANGE UP
IMM GRANULOCYTES NFR BLD AUTO: 0.3 % — SIGNIFICANT CHANGE UP (ref 0–1.5)
LYMPHOCYTES # BLD AUTO: 1.68 K/UL — SIGNIFICANT CHANGE UP (ref 1–3.3)
LYMPHOCYTES # BLD AUTO: 24.4 % — SIGNIFICANT CHANGE UP (ref 13–44)
MAGNESIUM SERPL-MCNC: 2.2 MG/DL — SIGNIFICANT CHANGE UP (ref 1.6–2.6)
MCHC RBC-ENTMCNC: 31.8 PG — SIGNIFICANT CHANGE UP (ref 27–34)
MCHC RBC-ENTMCNC: 34.7 GM/DL — SIGNIFICANT CHANGE UP (ref 32–36)
MCV RBC AUTO: 91.7 FL — SIGNIFICANT CHANGE UP (ref 80–100)
MONOCYTES # BLD AUTO: 0.56 K/UL — SIGNIFICANT CHANGE UP (ref 0–0.9)
MONOCYTES NFR BLD AUTO: 8.1 % — SIGNIFICANT CHANGE UP (ref 2–14)
NEUTROPHILS # BLD AUTO: 4.52 K/UL — SIGNIFICANT CHANGE UP (ref 1.8–7.4)
NEUTROPHILS NFR BLD AUTO: 65.7 % — SIGNIFICANT CHANGE UP (ref 43–77)
PHOSPHATE SERPL-MCNC: 3 MG/DL — SIGNIFICANT CHANGE UP (ref 2.5–4.5)
PLATELET # BLD AUTO: 299 K/UL — SIGNIFICANT CHANGE UP (ref 150–400)
POTASSIUM SERPL-MCNC: 4.1 MMOL/L — SIGNIFICANT CHANGE UP (ref 3.5–5.3)
POTASSIUM SERPL-SCNC: 4.1 MMOL/L — SIGNIFICANT CHANGE UP (ref 3.5–5.3)
PROT SERPL-MCNC: 6.4 G/DL — SIGNIFICANT CHANGE UP (ref 6–8.3)
RBC # BLD: 4.24 M/UL — SIGNIFICANT CHANGE UP (ref 4.2–5.8)
RBC # FLD: 16 % — HIGH (ref 10.3–14.5)
SODIUM SERPL-SCNC: 138 MMOL/L — SIGNIFICANT CHANGE UP (ref 135–145)
WBC # BLD: 6.88 K/UL — SIGNIFICANT CHANGE UP (ref 3.8–10.5)
WBC # FLD AUTO: 6.88 K/UL — SIGNIFICANT CHANGE UP (ref 3.8–10.5)

## 2017-09-23 PROCEDURE — 99233 SBSQ HOSP IP/OBS HIGH 50: CPT

## 2017-09-23 RX ADMIN — Medication 100 MILLIGRAM(S): at 11:34

## 2017-09-23 RX ADMIN — Medication 1 TABLET(S): at 11:34

## 2017-09-23 RX ADMIN — Medication 1.5 MILLIGRAM(S): at 09:05

## 2017-09-23 RX ADMIN — Medication 2 MILLIGRAM(S): at 00:28

## 2017-09-23 RX ADMIN — Medication 1.5 MILLIGRAM(S): at 16:30

## 2017-09-23 RX ADMIN — Medication 1 MILLIGRAM(S): at 11:35

## 2017-09-23 RX ADMIN — Medication 1.5 MILLIGRAM(S): at 20:09

## 2017-09-23 RX ADMIN — Medication 2 MILLIGRAM(S): at 04:06

## 2017-09-23 RX ADMIN — Medication 1.5 MILLIGRAM(S): at 12:22

## 2017-09-23 RX ADMIN — SODIUM CHLORIDE 75 MILLILITER(S): 9 INJECTION, SOLUTION INTRAVENOUS at 15:08

## 2017-09-23 RX ADMIN — Medication 1.5 MILLIGRAM(S): at 23:01

## 2017-09-23 NOTE — PROGRESS NOTE ADULT - PROBLEM SELECTOR PLAN 2
High Risk CIWA protocol.  Start Ativan 1.5mg IV q4h (with taper over the next few days).  Ativan IV and PO made available PRN per CIWA scores.  Continue MVI, thiamine and folate.  SW consult aware

## 2017-09-23 NOTE — PROGRESS NOTE ADULT - SUBJECTIVE AND OBJECTIVE BOX
Patient is a 57y old  Male who presents with a chief complaint of seizure (22 Sep 2017 08:54)      SUBJECTIVE / OVERNIGHT EVENTS: He feels better today. Doesn't remember me from yesterday. States he wants to quit drinking (although has failed on several occasions in the past).     MEDICATIONS  (STANDING):  multivitamin/thiamine/folic acid in sodium chloride 0.9% 1000 milliLiter(s) (75 mL/Hr) IV Continuous <Continuous>  influenza   Vaccine 0.5 milliLiter(s) IntraMuscular once  LORazepam   Injectable   IV Push   folic acid 1 milliGRAM(s) Oral daily  multivitamin 1 Tablet(s) Oral daily  thiamine 100 milliGRAM(s) Oral daily  LORazepam   Injectable 1.5 milliGRAM(s) IV Push every 4 hours    MEDICATIONS  (PRN):  LORazepam     Tablet 2 milliGRAM(s) Oral every 2 hours PRN Symptom-triggered 2 point increase in CIWA-Ar  LORazepam   Injectable 2 milliGRAM(s) IV Push every 1 hour PRN Symptom-triggered: each CIWA -Ar score 8 or GREATER      Vital Signs Last 24 Hrs  T(C): 36.9 (23 Sep 2017 15:05), Max: 36.9 (22 Sep 2017 18:36)  T(F): 98.4 (23 Sep 2017 15:05), Max: 98.5 (22 Sep 2017 20:16)  HR: 97 (23 Sep 2017 15:05) (88 - 108)  BP: 159/108 (23 Sep 2017 15:05) (126/85 - 159/108)  BP(mean): --  RR: 19 (23 Sep 2017 15:05) (17 - 19)  SpO2: 100% (23 Sep 2017 15:05) (95% - 100%)  CAPILLARY BLOOD GLUCOSE        I&O's Summary    22 Sep 2017 07:01  -  23 Sep 2017 07:00  --------------------------------------------------------  IN: 360 mL / OUT: 400 mL / NET: -40 mL    23 Sep 2017 07:01  -  23 Sep 2017 17:28  --------------------------------------------------------  IN: 400 mL / OUT: 0 mL / NET: 400 mL        PHYSICAL EXAM:  GENERAL: NAD, well-developed  HEAD:  Atraumatic, Normocephalic  EYES: EOMI, PERRLA, conjunctiva and sclera clear  NECK: Supple, No JVD  CHEST/LUNG: Clear to auscultation bilaterally; No wheeze  HEART: Regular rate and rhythm; No murmurs, rubs, or gallops  ABDOMEN: Soft, Nontender, Nondistended; Bowel sounds present  EXTREMITIES:  2+ Peripheral Pulses, No clubbing, cyanosis, or edema  PSYCH: AAOx4  NEUROLOGY: non-focal, no tremors  SKIN: No rashes or lesions    LABS:                        13.5   6.88  )-----------( 299      ( 23 Sep 2017 08:27 )             38.9     09-23    138  |  102  |  13  ----------------------------<  107<H>  4.1   |  22  |  0.93    Ca    9.4      23 Sep 2017 08:32  Phos  3.0     09-23  Mg     2.2     09-23    TPro  6.4  /  Alb  3.9  /  TBili  1.0  /  DBili  x   /  AST  29  /  ALT  18  /  AlkPhos  80  09-23

## 2017-09-23 NOTE — PROGRESS NOTE ADULT - ASSESSMENT
58yo man with PMH of alcoholism (no prior seizures during withdrawals), h/o right hemicolectomy in April 2017 due to polyps with post-op alcohol withdrawal and an episode of atrial fibrillation with RVR, presents today from home s/p a seizure episode.  Per discussion with the father at bedside, he had a tonic-clonic seizure, foaming at the mouth. The seizure broke spontaneously after about 10-15 minutes. After the seizure he was difficult to arouse and was post-ictal. He never had seizures before, but has significantly decreased his alcohol intake the day prior.

## 2017-09-23 NOTE — PROGRESS NOTE ADULT - PROBLEM SELECTOR PLAN 1
Likely due to alcohol withdrawal. CT head normal.  Seizure precautions.   Monitoring symptoms on CIWA protocol.  We discussed not driving for at least several months until we know for sure the seizures do not return.

## 2017-09-24 DIAGNOSIS — I10 ESSENTIAL (PRIMARY) HYPERTENSION: ICD-10-CM

## 2017-09-24 PROCEDURE — 99233 SBSQ HOSP IP/OBS HIGH 50: CPT

## 2017-09-24 RX ADMIN — Medication 1 MILLIGRAM(S): at 11:59

## 2017-09-24 RX ADMIN — SODIUM CHLORIDE 75 MILLILITER(S): 9 INJECTION, SOLUTION INTRAVENOUS at 12:00

## 2017-09-24 RX ADMIN — Medication 100 MILLIGRAM(S): at 11:59

## 2017-09-24 RX ADMIN — Medication 0.5 MILLIGRAM(S): at 23:42

## 2017-09-24 RX ADMIN — Medication 0.5 MILLIGRAM(S): at 14:57

## 2017-09-24 RX ADMIN — Medication 1 MILLIGRAM(S): at 10:56

## 2017-09-24 RX ADMIN — Medication 0.5 MILLIGRAM(S): at 18:09

## 2017-09-24 RX ADMIN — Medication 1.5 MILLIGRAM(S): at 02:35

## 2017-09-24 RX ADMIN — Medication 1 TABLET(S): at 11:59

## 2017-09-24 RX ADMIN — Medication 1 MILLIGRAM(S): at 06:21

## 2017-09-24 NOTE — PROGRESS NOTE ADULT - PROBLEM SELECTOR PLAN 1
Today the patient's father said that this is not the first time the patient had a seizure - last one was in the Spring time this year. Never worked up. The patient denies this - says it's from "blacking out."  If true, this would be a second time seizure. Still likely alcohol withdrawal related, but will need to do a more extensive work up.  CT head was negative.   Check EEG.  Seizure precautions.   Continue to monitor on High Risk CIWA protocol.  Changed Ativan to a more rapid taper.

## 2017-09-24 NOTE — PROVIDER CONTACT NOTE (OTHER) - ACTION/TREATMENT ORDERED:
md aware, pt agrees to stay on unit at this time, monitor BP, may prescribe medication, will continue monitoring
pt returned on own, insisted wants to go outside
recheck BP in one hour. continue to monitor

## 2017-09-24 NOTE — PROGRESS NOTE ADULT - PROBLEM SELECTOR PLAN 3
HTN may be due to alcohol withdrawal, but may be his baseline (he doesn't see doctors).  Start norvasc 5mg PO daily

## 2017-09-24 NOTE — PROGRESS NOTE ADULT - PROBLEM SELECTOR PLAN 2
High Risk CIWA protocol.  Ativan 0.5mg q6h (with taper)  Ativan IV and PO made available PRN per CIWA scores.  Continue MVI, thiamine and folate.  SW consult aware

## 2017-09-24 NOTE — PROGRESS NOTE ADULT - SUBJECTIVE AND OBJECTIVE BOX
Patient is a 57y old  Male who presents with a chief complaint of seizure (22 Sep 2017 08:54)      SUBJECTIVE / OVERNIGHT EVENTS: [employer] flight was called on the patient this afternoon - he was found by security downstairs. Apparently he wanted to enjoy some fresh air. Came back up - agreed not to leave again.     MEDICATIONS  (STANDING):  influenza   Vaccine 0.5 milliLiter(s) IntraMuscular once  folic acid 1 milliGRAM(s) Oral daily  multivitamin 1 Tablet(s) Oral daily  LORazepam     Tablet   Oral   LORazepam     Tablet 0.5 milliGRAM(s) Oral every 6 hours    MEDICATIONS  (PRN):  LORazepam     Tablet 2 milliGRAM(s) Oral every 2 hours PRN Symptom-triggered 2 point increase in CIWA-Ar  LORazepam   Injectable 2 milliGRAM(s) IV Push every 1 hour PRN Symptom-triggered: each CIWA -Ar score 8 or GREATER      Vital Signs Last 24 Hrs  T(C): 36 (24 Sep 2017 11:57), Max: 36.9 (23 Sep 2017 15:05)  T(F): 96.8 (24 Sep 2017 11:57), Max: 98.4 (23 Sep 2017 15:05)  HR: 83 (24 Sep 2017 11:57) (72 - 108)  BP: 148/102 (24 Sep 2017 12:37) (136/94 - 159/108)  BP(mean): --  RR: 18 (24 Sep 2017 11:57) (18 - 19)  SpO2: 97% (24 Sep 2017 11:57) (96% - 100%)  CAPILLARY BLOOD GLUCOSE        I&O's Summary    23 Sep 2017 07:01  -  24 Sep 2017 07:00  --------------------------------------------------------  IN: 1100 mL / OUT: 0 mL / NET: 1100 mL    24 Sep 2017 07:01  -  24 Sep 2017 14:59  --------------------------------------------------------  IN: 420 mL / OUT: 0 mL / NET: 420 mL        PHYSICAL EXAM:  GENERAL: NAD, well-developed  HEAD:  Atraumatic, Normocephalic  EYES: EOMI, PERRLA, conjunctiva and sclera clear  NECK: Supple, No JVD  CHEST/LUNG: Clear to auscultation bilaterally; No wheeze  HEART: Regular rate and rhythm; No murmurs, rubs, or gallops  ABDOMEN: Soft, Nontender, Nondistended; Bowel sounds present  EXTREMITIES:  2+ Peripheral Pulses, No clubbing, cyanosis, or edema  PSYCH: AAOx4  NEUROLOGY: non-focal, no tremors  SKIN: No rashes or lesions      LABS:                        13.5   6.88  )-----------( 299      ( 23 Sep 2017 08:27 )             38.9     09-23    138  |  102  |  13  ----------------------------<  107<H>  4.1   |  22  |  0.93    Ca    9.4      23 Sep 2017 08:32  Phos  3.0     09-23  Mg     2.2     09-23    TPro  6.4  /  Alb  3.9  /  TBili  1.0  /  DBili  x   /  AST  29  /  ALT  18  /  AlkPhos  80  09-23

## 2017-09-25 ENCOUNTER — TRANSCRIPTION ENCOUNTER (OUTPATIENT)
Age: 57
End: 2017-09-25

## 2017-09-25 VITALS
TEMPERATURE: 98 F | DIASTOLIC BLOOD PRESSURE: 93 MMHG | SYSTOLIC BLOOD PRESSURE: 132 MMHG | OXYGEN SATURATION: 97 % | HEART RATE: 79 BPM | RESPIRATION RATE: 18 BRPM

## 2017-09-25 LAB
ANION GAP SERPL CALC-SCNC: 12 MMOL/L — SIGNIFICANT CHANGE UP (ref 5–17)
BUN SERPL-MCNC: 13 MG/DL — SIGNIFICANT CHANGE UP (ref 7–23)
CALCIUM SERPL-MCNC: 9.9 MG/DL — SIGNIFICANT CHANGE UP (ref 8.4–10.5)
CHLORIDE SERPL-SCNC: 100 MMOL/L — SIGNIFICANT CHANGE UP (ref 96–108)
CO2 SERPL-SCNC: 24 MMOL/L — SIGNIFICANT CHANGE UP (ref 22–31)
CREAT SERPL-MCNC: 0.94 MG/DL — SIGNIFICANT CHANGE UP (ref 0.5–1.3)
GLUCOSE SERPL-MCNC: 116 MG/DL — HIGH (ref 70–99)
HCT VFR BLD CALC: 39.3 % — SIGNIFICANT CHANGE UP (ref 39–50)
HGB BLD-MCNC: 13.5 G/DL — SIGNIFICANT CHANGE UP (ref 13–17)
MCHC RBC-ENTMCNC: 31.5 PG — SIGNIFICANT CHANGE UP (ref 27–34)
MCHC RBC-ENTMCNC: 34.4 GM/DL — SIGNIFICANT CHANGE UP (ref 32–36)
MCV RBC AUTO: 91.6 FL — SIGNIFICANT CHANGE UP (ref 80–100)
PLATELET # BLD AUTO: 267 K/UL — SIGNIFICANT CHANGE UP (ref 150–400)
POTASSIUM SERPL-MCNC: 4.2 MMOL/L — SIGNIFICANT CHANGE UP (ref 3.5–5.3)
POTASSIUM SERPL-SCNC: 4.2 MMOL/L — SIGNIFICANT CHANGE UP (ref 3.5–5.3)
RBC # BLD: 4.29 M/UL — SIGNIFICANT CHANGE UP (ref 4.2–5.8)
RBC # FLD: 15.6 % — HIGH (ref 10.3–14.5)
SODIUM SERPL-SCNC: 136 MMOL/L — SIGNIFICANT CHANGE UP (ref 135–145)
WBC # BLD: 7.22 K/UL — SIGNIFICANT CHANGE UP (ref 3.8–10.5)
WBC # FLD AUTO: 7.22 K/UL — SIGNIFICANT CHANGE UP (ref 3.8–10.5)

## 2017-09-25 PROCEDURE — 83036 HEMOGLOBIN GLYCOSYLATED A1C: CPT

## 2017-09-25 PROCEDURE — 80048 BASIC METABOLIC PNL TOTAL CA: CPT

## 2017-09-25 PROCEDURE — 82330 ASSAY OF CALCIUM: CPT

## 2017-09-25 PROCEDURE — 96374 THER/PROPH/DIAG INJ IV PUSH: CPT

## 2017-09-25 PROCEDURE — 84295 ASSAY OF SERUM SODIUM: CPT

## 2017-09-25 PROCEDURE — 85014 HEMATOCRIT: CPT

## 2017-09-25 PROCEDURE — 83605 ASSAY OF LACTIC ACID: CPT

## 2017-09-25 PROCEDURE — 99239 HOSP IP/OBS DSCHRG MGMT >30: CPT

## 2017-09-25 PROCEDURE — 82435 ASSAY OF BLOOD CHLORIDE: CPT

## 2017-09-25 PROCEDURE — 80053 COMPREHEN METABOLIC PANEL: CPT

## 2017-09-25 PROCEDURE — 80307 DRUG TEST PRSMV CHEM ANLYZR: CPT

## 2017-09-25 PROCEDURE — 84443 ASSAY THYROID STIM HORMONE: CPT

## 2017-09-25 PROCEDURE — 99285 EMERGENCY DEPT VISIT HI MDM: CPT | Mod: 25

## 2017-09-25 PROCEDURE — 70450 CT HEAD/BRAIN W/O DYE: CPT

## 2017-09-25 PROCEDURE — 85027 COMPLETE CBC AUTOMATED: CPT

## 2017-09-25 PROCEDURE — 82803 BLOOD GASES ANY COMBINATION: CPT

## 2017-09-25 PROCEDURE — 96375 TX/PRO/DX INJ NEW DRUG ADDON: CPT

## 2017-09-25 PROCEDURE — 84132 ASSAY OF SERUM POTASSIUM: CPT

## 2017-09-25 PROCEDURE — 87389 HIV-1 AG W/HIV-1&-2 AB AG IA: CPT

## 2017-09-25 PROCEDURE — 90686 IIV4 VACC NO PRSV 0.5 ML IM: CPT

## 2017-09-25 PROCEDURE — 86803 HEPATITIS C AB TEST: CPT

## 2017-09-25 PROCEDURE — 82947 ASSAY GLUCOSE BLOOD QUANT: CPT

## 2017-09-25 PROCEDURE — 84100 ASSAY OF PHOSPHORUS: CPT

## 2017-09-25 PROCEDURE — 83735 ASSAY OF MAGNESIUM: CPT

## 2017-09-25 PROCEDURE — 93005 ELECTROCARDIOGRAM TRACING: CPT

## 2017-09-25 RX ADMIN — INFLUENZA VIRUS VACCINE 0.5 MILLILITER(S): 15; 15; 15; 15 SUSPENSION INTRAMUSCULAR at 10:52

## 2017-09-25 RX ADMIN — Medication 0.5 MILLIGRAM(S): at 06:27

## 2017-09-25 NOTE — DISCHARGE NOTE ADULT - CARE PLAN
Principal Discharge DX:	Alcohol withdrawal syndrome, with unspecified complication Principal Discharge DX:	Alcohol withdrawal syndrome, with unspecified complication  Goal:	Remain free of symptoms  Instructions for follow-up, activity and diet:	Abstain from use of alcohol  Follow up with your primary care provider Principal Discharge DX:	Alcohol withdrawal syndrome, with unspecified complication  Goal:	Remain free of symptoms  Instructions for follow-up, activity and diet:	Abstain from use of alcohol  Follow up with your primary care provider  Secondary Diagnosis:	Seizure  Instructions for follow-up, activity and diet:	Follow up with neurology

## 2017-09-25 NOTE — PROGRESS NOTE ADULT - PROBLEM SELECTOR PLAN 3
HTN may be due to alcohol withdrawal, but may be his baseline (he doesn't see doctors).  BP now better controlled.  - recommended to have PMD

## 2017-09-25 NOTE — DISCHARGE NOTE ADULT - HOSPITAL COURSE
57 year old male with pmh alcohol abuse, presents to ED s/p seizure while in bed at home. Was admitted for alcohol withdrawal, given ativan kait, symptoms have improved , he is stable for discharge with out pt follow up 57 year old male with pmh alcohol abuse, presents to ED s/p witnessed seizure while in bed at home. CT head was negative for any intracranial lesions. Lab works were negative as well for possible etiology of his seizure. He was admitted for alcohol withdrawal, given ativan taper. He has remained hemodynamically stable and no scoring on CIWA. Patient himself reported improvement in his symptoms, Given that his seizure likely related to his alcohol intake, he was given extensive education on cessation of alcohol. In addition, as this was not the first episode, he was advised to make an appointment with neurology for seizure workup upon discharge. He is to continue attending AA meeting and to seek rehab upon discharge. He is stable for discharge with out pt follow up

## 2017-09-25 NOTE — PROGRESS NOTE ADULT - PROBLEM SELECTOR PLAN 2
s/p ativan taper course. Not scoring CIWA > 24 hrs.   Ativan 0.5mg q6h (with taper)  Ativan IV and PO made available PRN per CIWA scores.  Continue MVI, thiamine and folate.  SW consult aware

## 2017-09-25 NOTE — DISCHARGE NOTE ADULT - PLAN OF CARE
Remain free of symptoms Abstain from use of alcohol  Follow up with your primary care provider Follow up with neurology

## 2017-09-25 NOTE — DISCHARGE NOTE ADULT - PROVIDER TOKENS
FREE:[LAST:[PMD],PHONE:[(   )    -],FAX:[(   )    -]] FREE:[LAST:[PMD],PHONE:[(   )    -],FAX:[(   )    -]],FREE:[LAST:[Neurology],PHONE:[phone]],FAX:[(   )    -]]

## 2017-09-25 NOTE — PROGRESS NOTE ADULT - ATTENDING COMMENTS
Safe discharge planning discussed with LINDA Duong and patient. Give the risk of recurrence of alcohol related seizures, he was strongly advised to quit EtOH abuse. He already attends AA and plans to seek for rehab upon discharge. He was also advised to follow neurology as outpatient for seizure workups. He agrees with the plan and to be discharged home today. All questions and concerns were addressed.

## 2017-09-25 NOTE — PROGRESS NOTE ADULT - SUBJECTIVE AND OBJECTIVE BOX
Patient is a 57y old  Male who presents with a chief complaint of seizure (25 Sep 2017 10:38)      SUBJECTIVE / OVERNIGHT EVENTS:  no acute events overnight. vss, afebrile. Pt feels like he is not withdrawing anymore - he has been tapering his alcohol intake for the past weeks and thinks that might have triggered the withdrawal symptoms. He already follows with AA and is going to look for the rehab upon discharge. He has not scored at all in CIWA and has been ambulating well. He feels ready to go home.     MEDICATIONS  (STANDING):  folic acid 1 milliGRAM(s) Oral daily  multivitamin 1 Tablet(s) Oral daily  LORazepam     Tablet   Oral   LORazepam     Tablet 0.5 milliGRAM(s) Oral every 12 hours    MEDICATIONS  (PRN):  LORazepam     Tablet 2 milliGRAM(s) Oral every 2 hours PRN Symptom-triggered 2 point increase in CIWA-Ar  LORazepam   Injectable 2 milliGRAM(s) IV Push every 1 hour PRN Symptom-triggered: each CIWA -Ar score 8 or GREATER      CAPILLARY BLOOD GLUCOSE        I&O's Summary    24 Sep 2017 07:01  -  25 Sep 2017 07:00  --------------------------------------------------------  IN: 420 mL / OUT: 0 mL / NET: 420 mL        PHYSICAL EXAM:  Vital Signs Last 24 Hrs  T(C): 36.7 (25 Sep 2017 03:54), Max: 36.8 (24 Sep 2017 18:11)  T(F): 98.1 (25 Sep 2017 03:54), Max: 98.2 (24 Sep 2017 18:11)  HR: 79 (25 Sep 2017 03:54) (78 - 86)  BP: 132/93 (25 Sep 2017 03:54) (132/93 - 157/109)  BP(mean): --  RR: 18 (25 Sep 2017 03:54) (18 - 18)  SpO2: 97% (25 Sep 2017 03:54) (95% - 98%)    GENERAL: NAD, well-developed  HEAD:  Atraumatic, Normocephalic  EYES: EOMI, PERRLA, conjunctiva and sclera clear  NECK: Supple, No JVD  CHEST/LUNG: Clear to auscultation bilaterally; No wheeze  HEART: Regular rate and rhythm; No murmurs, rubs, or gallops  ABDOMEN: Soft, Nontender, Nondistended; Bowel sounds present  EXTREMITIES:  2+ Peripheral Pulses, No clubbing, cyanosis, or edema  NEUROLOGY: aaox3; non-focal; not tremulous, gait wnl; good balance  SKIN: No rashes or lesions    LABS:                        13.5   7.22  )-----------( 267      ( 25 Sep 2017 07:29 )             39.3     09-25    136  |  100  |  13  ----------------------------<  116<H>  4.2   |  24  |  0.94    Ca    9.9      25 Sep 2017 07:27        Personally reviewed the labs  - H/H stable  - BMP wnl        RADIOLOGY & ADDITIONAL TESTS:    Imaging Personally Reviewed:    Consultant(s) Notes Reviewed:      Care Discussed with Consultants/Other Providers:

## 2017-09-25 NOTE — DISCHARGE NOTE ADULT - MEDICATION SUMMARY - MEDICATIONS TO TAKE
I will START or STAY ON the medications listed below when I get home from the hospital:    Centrum oral tablet  -- 1 tab(s) by mouth once a day  -- Indication: For Supplement

## 2017-09-25 NOTE — PROGRESS NOTE ADULT - PROBLEM SELECTOR PLAN 1
As per patient's father, this is not the first time the patient had a seizure - last one was in the Spring time this year. Never worked up. The patient denies this - says it's from "blacking out." If true, this would be a second time seizure. Still likely alcohol withdrawal related, but will need to do a more extensive work up. CT head was negative. Extensive education was provided to the patient especially given that this was not the first episode, putting him at higher risk of recurrence if he goes back to etoh. He has not scored any CIWA > 24hrs. Demonstrated stable gait, no signs/symptoms of withdrawal at this time. Hemodynamically stable to be discharged home with follow up with neuro    - educated on etoh cessation  - neuro as outpatient for seizure workup

## 2017-09-25 NOTE — DISCHARGE NOTE ADULT - CARE PROVIDER_API CALL
RAKAN,   Phone: (   )    -  Fax: (   )    - PMD,   Phone: (   )    -  Fax: (   )    -    Neurology,   Phone: [phone]  Fax: (   )    -

## 2018-08-13 ENCOUNTER — APPOINTMENT (OUTPATIENT)
Dept: UROLOGY | Facility: CLINIC | Age: 58
End: 2018-08-13

## 2018-11-21 PROBLEM — K63.5 POLYP OF COLON: Chronic | Status: ACTIVE | Noted: 2017-04-07

## 2018-12-10 NOTE — H&P ADULT. - PRO ARRIVE FROM
Patient came in today for a bp check. His blood pressure today was 134/79. BP las week was 138/69, the week before was 185/82. I advised patient to come by next week and we could recheck and to let us know if he has any problems until then. home

## 2018-12-11 ENCOUNTER — APPOINTMENT (OUTPATIENT)
Dept: INTERNAL MEDICINE | Facility: CLINIC | Age: 58
End: 2018-12-11
Payer: MEDICAID

## 2018-12-11 ENCOUNTER — NON-APPOINTMENT (OUTPATIENT)
Age: 58
End: 2018-12-11

## 2018-12-11 VITALS
HEIGHT: 71 IN | BODY MASS INDEX: 29.54 KG/M2 | WEIGHT: 211 LBS | DIASTOLIC BLOOD PRESSURE: 88 MMHG | SYSTOLIC BLOOD PRESSURE: 120 MMHG

## 2018-12-11 DIAGNOSIS — Z86.010 PERSONAL HISTORY OF COLONIC POLYPS: ICD-10-CM

## 2018-12-11 DIAGNOSIS — R03.0 ELEVATED BLOOD-PRESSURE READING, W/OUT DIAGNOSIS OF HYPERTENSION: ICD-10-CM

## 2018-12-11 DIAGNOSIS — I48.0 PAROXYSMAL ATRIAL FIBRILLATION: ICD-10-CM

## 2018-12-11 DIAGNOSIS — R93.3 ABNORMAL FINDINGS ON DIAGNOSTIC IMAGING OF OTHER PARTS OF DIGESTIVE TRACT: ICD-10-CM

## 2018-12-11 DIAGNOSIS — R41.3 OTHER AMNESIA: ICD-10-CM

## 2018-12-11 PROCEDURE — 90471 IMMUNIZATION ADMIN: CPT

## 2018-12-11 PROCEDURE — 36415 COLL VENOUS BLD VENIPUNCTURE: CPT

## 2018-12-11 PROCEDURE — 99396 PREV VISIT EST AGE 40-64: CPT | Mod: 25

## 2018-12-11 PROCEDURE — 93000 ELECTROCARDIOGRAM COMPLETE: CPT

## 2018-12-11 PROCEDURE — 90715 TDAP VACCINE 7 YRS/> IM: CPT

## 2018-12-12 ENCOUNTER — RESULT REVIEW (OUTPATIENT)
Age: 58
End: 2018-12-12

## 2018-12-12 LAB
ALBUMIN SERPL ELPH-MCNC: 4.7 G/DL
ALP BLD-CCNC: 68 U/L
ALT SERPL-CCNC: 18 U/L
ANION GAP SERPL CALC-SCNC: 12 MMOL/L
APPEARANCE: CLEAR
AST SERPL-CCNC: 21 U/L
BACTERIA: NEGATIVE
BASOPHILS # BLD AUTO: 0.05 K/UL
BASOPHILS NFR BLD AUTO: 0.6 %
BILIRUB SERPL-MCNC: 0.2 MG/DL
BILIRUBIN URINE: NEGATIVE
BLOOD URINE: NEGATIVE
BUN SERPL-MCNC: 18 MG/DL
CALCIUM SERPL-MCNC: 9.9 MG/DL
CHLORIDE SERPL-SCNC: 97 MMOL/L
CO2 SERPL-SCNC: 28 MMOL/L
COLOR: YELLOW
CREAT SERPL-MCNC: 1.21 MG/DL
EOSINOPHIL # BLD AUTO: 0.17 K/UL
EOSINOPHIL NFR BLD AUTO: 2.2 %
FOLATE SERPL-MCNC: >20 NG/ML
GLUCOSE QUALITATIVE U: NEGATIVE MG/DL
GLUCOSE SERPL-MCNC: 90 MG/DL
HBA1C MFR BLD HPLC: 5.7 %
HCT VFR BLD CALC: 44.6 %
HGB BLD-MCNC: 14.7 G/DL
IMM GRANULOCYTES NFR BLD AUTO: 0.4 %
KETONES URINE: NEGATIVE
LEUKOCYTE ESTERASE URINE: NEGATIVE
LYMPHOCYTES # BLD AUTO: 2.04 K/UL
LYMPHOCYTES NFR BLD AUTO: 26.4 %
MAN DIFF?: NORMAL
MCHC RBC-ENTMCNC: 30.3 PG
MCHC RBC-ENTMCNC: 33 GM/DL
MCV RBC AUTO: 92 FL
MICROSCOPIC-UA: NORMAL
MONOCYTES # BLD AUTO: 0.47 K/UL
MONOCYTES NFR BLD AUTO: 6.1 %
NEUTROPHILS # BLD AUTO: 4.98 K/UL
NEUTROPHILS NFR BLD AUTO: 64.3 %
NITRITE URINE: NEGATIVE
PH URINE: 7.5
PLATELET # BLD AUTO: 376 K/UL
POTASSIUM SERPL-SCNC: 4.4 MMOL/L
PROT SERPL-MCNC: 7.2 G/DL
PROTEIN URINE: NEGATIVE MG/DL
PSA SERPL-MCNC: 2.4 NG/ML
RBC # BLD: 4.85 M/UL
RBC # FLD: 13.5 %
RED BLOOD CELLS URINE: 1 /HPF
SODIUM SERPL-SCNC: 137 MMOL/L
SPECIFIC GRAVITY URINE: 1.02
SQUAMOUS EPITHELIAL CELLS: 0 /HPF
T PALLIDUM AB SER QL IA: NEGATIVE
UROBILINOGEN URINE: NEGATIVE MG/DL
VIT B12 SERPL-MCNC: 475 PG/ML
WBC # FLD AUTO: 7.74 K/UL
WHITE BLOOD CELLS URINE: 0 /HPF

## 2018-12-17 ENCOUNTER — RESULT REVIEW (OUTPATIENT)
Age: 58
End: 2018-12-17

## 2018-12-17 LAB — TSH SERPL-ACNC: 3.17 UIU/ML

## 2018-12-19 ENCOUNTER — RESULT REVIEW (OUTPATIENT)
Age: 58
End: 2018-12-19

## 2018-12-19 DIAGNOSIS — E55.9 VITAMIN D DEFICIENCY, UNSPECIFIED: ICD-10-CM

## 2018-12-19 LAB
25(OH)D3 SERPL-MCNC: 27.1 NG/ML
BACTERIA UR CULT: NORMAL
CHOLEST SERPL-MCNC: 199 MG/DL
CHOLEST/HDLC SERPL: 4.3 RATIO
HDLC SERPL-MCNC: 46 MG/DL
LDLC SERPL CALC-MCNC: 123 MG/DL
TRIGL SERPL-MCNC: 148 MG/DL
VIT B1 SERPL-MCNC: 173.4 NMOL/L

## 2019-01-08 ENCOUNTER — APPOINTMENT (OUTPATIENT)
Dept: UROLOGY | Facility: CLINIC | Age: 59
End: 2019-01-08

## 2019-01-22 ENCOUNTER — APPOINTMENT (OUTPATIENT)
Dept: UROLOGY | Facility: CLINIC | Age: 59
End: 2019-01-22

## 2019-02-19 ENCOUNTER — APPOINTMENT (OUTPATIENT)
Dept: NEUROLOGY | Facility: CLINIC | Age: 59
End: 2019-02-19

## 2019-04-02 ENCOUNTER — APPOINTMENT (OUTPATIENT)
Dept: UROLOGY | Facility: CLINIC | Age: 59
End: 2019-04-02
Payer: MEDICAID

## 2019-04-02 VITALS
RESPIRATION RATE: 18 BRPM | BODY MASS INDEX: 29.71 KG/M2 | SYSTOLIC BLOOD PRESSURE: 137 MMHG | TEMPERATURE: 97.6 F | HEART RATE: 58 BPM | OXYGEN SATURATION: 97 % | DIASTOLIC BLOOD PRESSURE: 89 MMHG | WEIGHT: 213 LBS

## 2019-04-02 DIAGNOSIS — R93.5 ABNORMAL FINDINGS ON DIAGNOSTIC IMAGING OF OTHER ABDOMINAL REGIONS, INCLUDING RETROPERITONEUM: ICD-10-CM

## 2019-04-02 PROCEDURE — 99214 OFFICE O/P EST MOD 30 MIN: CPT

## 2019-04-02 NOTE — LETTER BODY
[FreeTextEntry1] : Ignacia Nj MD\par [address]\[place] [address]\par [phone]\par \par Dear Dr. Nj,\par \par Reason for visit: BPH. \par \par This is a 58 year-old gentleman with chronic BPH. Previous cystoscopy demonstrated a median lobe. He reports now weak urinary flow. The patient is not currently taking any prostate medication.The patient returns today for follow up. Since his last visit, the patient reports of increased urinary frequency. He has nocturia up to 2x per night. Patient denies any urinary retention or hematuria or changes in health. The patient had colon surgery recently for polyp removal. Patient has no pain. The past medical history and family history and social history are unchanged. All other review of systems are negative. Patient denies any changes in medications. Medication list was reconciled.\par \par On examination, the patient is a healthy-appearing gentleman in no acute distress. He is alert and oriented follows commands. He has normal mood and affect. He is normocephalic. Oral no thrush. Neck is supple. Respirations are unlabored. His abdomen is soft and nontender. Liver is nonpalpable. Bladder is nonpalpable. No CVA tenderness. Neurologically he is grossly intact. No peripheral edema. Skin without gross abnormality. He has normal male external genitalia. Normal meatus. Bilateral testes are descended intrascrotally and normal to palpation. On rectal examination, there is normal sphincter tone. The prostate is clinically benign without focal induration or nodularity.\par \par His recent PSA is 2.4, which is stable.\par \par Post-void residual on bladder scan today was 169 cc. \par \par Assessment: BPH.\par \par I counseled the patient. His PVR today was 169 cc. I recommend he start a trial of Flomax. I discussed the potential side effects of the medication. I counseled the patient on its use and side effects. If the patient develops any side effects, the patient will discontinue the medication and contact me. Risks and alternatives were discussed. I answered the patient's questions. The patient will follow up as directed and will contact me with any questions or concerns. Thank you for the opportunity to participate in the care of Mr. PHILLIP. I will keep you updated on his progress. \par \par Plan: Trial of Flomax. Follow up in 1 month.

## 2019-04-02 NOTE — ADDENDUM
[FreeTextEntry1] : Entered by Neva Cook, acting as scribe for Dr. Jason Hartman.\par \par The documentation recorded by the scribe accurately reflects the service I personally performed and the decisions made by me.

## 2019-05-13 ENCOUNTER — APPOINTMENT (OUTPATIENT)
Dept: INTERNAL MEDICINE | Facility: CLINIC | Age: 59
End: 2019-05-13
Payer: MEDICAID

## 2019-05-13 VITALS
DIASTOLIC BLOOD PRESSURE: 80 MMHG | SYSTOLIC BLOOD PRESSURE: 120 MMHG | WEIGHT: 214 LBS | BODY MASS INDEX: 29.96 KG/M2 | HEIGHT: 71 IN

## 2019-05-13 DIAGNOSIS — R73.03 PREDIABETES.: ICD-10-CM

## 2019-05-13 DIAGNOSIS — N40.1 BENIGN PROSTATIC HYPERPLASIA WITH LOWER URINARY TRACT SYMPMS: ICD-10-CM

## 2019-05-13 PROCEDURE — 36415 COLL VENOUS BLD VENIPUNCTURE: CPT

## 2019-05-13 PROCEDURE — 99214 OFFICE O/P EST MOD 30 MIN: CPT | Mod: 25

## 2019-05-14 ENCOUNTER — APPOINTMENT (OUTPATIENT)
Dept: UROLOGY | Facility: CLINIC | Age: 59
End: 2019-05-14

## 2019-05-14 LAB — TSH SERPL-ACNC: 3.12 UIU/ML

## 2019-05-15 LAB
ALBUMIN SERPL ELPH-MCNC: 4.3 G/DL
ALP BLD-CCNC: 59 U/L
ALT SERPL-CCNC: 13 U/L
ANION GAP SERPL CALC-SCNC: 7 MMOL/L
AST SERPL-CCNC: 11 U/L
BASOPHILS # BLD AUTO: 0.06 K/UL
BASOPHILS NFR BLD AUTO: 0.9 %
BILIRUB SERPL-MCNC: 0.2 MG/DL
BUN SERPL-MCNC: 16 MG/DL
CALCIUM SERPL-MCNC: 9.5 MG/DL
CHLORIDE SERPL-SCNC: 104 MMOL/L
CO2 SERPL-SCNC: 29 MMOL/L
CREAT SERPL-MCNC: 1.01 MG/DL
EOSINOPHIL # BLD AUTO: 0.18 K/UL
EOSINOPHIL NFR BLD AUTO: 2.8 %
ESTIMATED AVERAGE GLUCOSE: 117 MG/DL
FERRITIN SERPL-MCNC: 5 NG/ML
FOLATE SERPL-MCNC: >20 NG/ML
GLUCOSE SERPL-MCNC: 105 MG/DL
HBA1C MFR BLD HPLC: 5.7 %
HCT VFR BLD CALC: 28.6 %
HGB BLD-MCNC: 8.8 G/DL
IMM GRANULOCYTES NFR BLD AUTO: 0.2 %
IRON SATN MFR SERPL: 7 %
IRON SERPL-MCNC: 30 UG/DL
LYMPHOCYTES # BLD AUTO: 1.91 K/UL
LYMPHOCYTES NFR BLD AUTO: 29.7 %
MAN DIFF?: NORMAL
MCHC RBC-ENTMCNC: 27.9 PG
MCHC RBC-ENTMCNC: 30.8 GM/DL
MCV RBC AUTO: 90.8 FL
MONOCYTES # BLD AUTO: 0.61 K/UL
MONOCYTES NFR BLD AUTO: 9.5 %
NEUTROPHILS # BLD AUTO: 3.67 K/UL
NEUTROPHILS NFR BLD AUTO: 56.9 %
PLATELET # BLD AUTO: 372 K/UL
POTASSIUM SERPL-SCNC: 5.2 MMOL/L
PROT SERPL-MCNC: 6.2 G/DL
RBC # BLD: 3.15 M/UL
RBC # FLD: 13.2 %
SODIUM SERPL-SCNC: 140 MMOL/L
TIBC SERPL-MCNC: 422 UG/DL
UIBC SERPL-MCNC: 392 UG/DL
VIT B12 SERPL-MCNC: 555 PG/ML
WBC # FLD AUTO: 6.44 K/UL

## 2019-05-17 ENCOUNTER — APPOINTMENT (OUTPATIENT)
Dept: INTERNAL MEDICINE | Facility: CLINIC | Age: 59
End: 2019-05-17
Payer: MEDICAID

## 2019-05-17 VITALS — SYSTOLIC BLOOD PRESSURE: 110 MMHG | HEART RATE: 72 BPM | DIASTOLIC BLOOD PRESSURE: 70 MMHG

## 2019-05-17 VITALS — DIASTOLIC BLOOD PRESSURE: 70 MMHG | HEART RATE: 102 BPM | SYSTOLIC BLOOD PRESSURE: 100 MMHG

## 2019-05-17 LAB
ALBUMIN SERPL ELPH-MCNC: 4.2 G/DL
ALP BLD-CCNC: 57 U/L
ALT SERPL-CCNC: 12 U/L
ANION GAP SERPL CALC-SCNC: 8 MMOL/L
APTT BLD: 32.4 SEC
AST SERPL-CCNC: 13 U/L
BASOPHILS # BLD AUTO: 0.07 K/UL
BASOPHILS NFR BLD AUTO: 1.1 %
BILIRUB SERPL-MCNC: 0.2 MG/DL
BUN SERPL-MCNC: 20 MG/DL
CALCIUM SERPL-MCNC: 9.5 MG/DL
CHLORIDE SERPL-SCNC: 104 MMOL/L
CO2 SERPL-SCNC: 27 MMOL/L
CREAT SERPL-MCNC: 1.01 MG/DL
EOSINOPHIL # BLD AUTO: 0.17 K/UL
EOSINOPHIL NFR BLD AUTO: 2.6 %
GLUCOSE SERPL-MCNC: 105 MG/DL
HCT VFR BLD CALC: 26.9 %
HEMOGLOBIN: 7.5
HGB BLD-MCNC: 8.4 G/DL
IMM GRANULOCYTES NFR BLD AUTO: 0.3 %
INR PPP: 0.99 RATIO
LYMPHOCYTES # BLD AUTO: 1.95 K/UL
LYMPHOCYTES NFR BLD AUTO: 29.7 %
MAN DIFF?: NORMAL
MCHC RBC-ENTMCNC: 27.3 PG
MCHC RBC-ENTMCNC: 31.2 GM/DL
MCV RBC AUTO: 87.3 FL
MONOCYTES # BLD AUTO: 0.59 K/UL
MONOCYTES NFR BLD AUTO: 9 %
NEUTROPHILS # BLD AUTO: 3.77 K/UL
NEUTROPHILS NFR BLD AUTO: 57.3 %
PLATELET # BLD AUTO: 390 K/UL
POTASSIUM SERPL-SCNC: 5.1 MMOL/L
PROT SERPL-MCNC: 6.1 G/DL
PT BLD: 11.2 SEC
RBC # BLD: 3.08 M/UL
RBC # FLD: 13.2 %
SODIUM SERPL-SCNC: 139 MMOL/L
WBC # FLD AUTO: 6.57 K/UL

## 2019-05-17 PROCEDURE — 99213 OFFICE O/P EST LOW 20 MIN: CPT | Mod: 25

## 2019-05-17 PROCEDURE — 85018 HEMOGLOBIN: CPT | Mod: QW

## 2019-05-17 RX ORDER — ASPIRIN ENTERIC COATED TABLETS 81 MG 81 MG/1
81 TABLET, DELAYED RELEASE ORAL
Refills: 6 | Status: DISCONTINUED | COMMUNITY
Start: 2018-12-11 | End: 2019-05-17

## 2019-05-17 RX ORDER — CHLORHEXIDINE GLUCONATE 4 %
325 (65 FE) LIQUID (ML) TOPICAL TWICE DAILY
Refills: 0 | Status: ACTIVE | COMMUNITY
Start: 2019-05-17

## 2019-05-18 ENCOUNTER — EMERGENCY (EMERGENCY)
Facility: HOSPITAL | Age: 59
LOS: 1 days | Discharge: HOME | End: 2019-05-18
Attending: EMERGENCY MEDICINE
Payer: MEDICAID

## 2019-05-18 VITALS
HEART RATE: 80 BPM | WEIGHT: 210.1 LBS | DIASTOLIC BLOOD PRESSURE: 80 MMHG | SYSTOLIC BLOOD PRESSURE: 118 MMHG | TEMPERATURE: 98 F | RESPIRATION RATE: 18 BRPM | HEIGHT: 72 IN | OXYGEN SATURATION: 98 %

## 2019-05-18 DIAGNOSIS — N46.9 MALE INFERTILITY, UNSPECIFIED: Chronic | ICD-10-CM

## 2019-05-18 DIAGNOSIS — K63.5 POLYP OF COLON: Chronic | ICD-10-CM

## 2019-05-18 LAB
ALBUMIN SERPL ELPH-MCNC: 4 G/DL — SIGNIFICANT CHANGE UP (ref 3.3–5)
ALP SERPL-CCNC: 56 U/L — SIGNIFICANT CHANGE UP (ref 40–120)
ALT FLD-CCNC: 12 U/L — SIGNIFICANT CHANGE UP (ref 10–45)
ANION GAP SERPL CALC-SCNC: 9 MMOL/L — SIGNIFICANT CHANGE UP (ref 5–17)
AST SERPL-CCNC: 16 U/L — SIGNIFICANT CHANGE UP (ref 10–40)
BASE EXCESS BLDV CALC-SCNC: 3.6 MMOL/L — HIGH (ref -2–2)
BASOPHILS # BLD AUTO: 0.1 K/UL — SIGNIFICANT CHANGE UP (ref 0–0.2)
BASOPHILS NFR BLD AUTO: 1.1 % — SIGNIFICANT CHANGE UP (ref 0–2)
BILIRUB SERPL-MCNC: 0.2 MG/DL — SIGNIFICANT CHANGE UP (ref 0.2–1.2)
BLD GP AB SCN SERPL QL: NEGATIVE — SIGNIFICANT CHANGE UP
BUN SERPL-MCNC: 18 MG/DL — SIGNIFICANT CHANGE UP (ref 7–23)
CA-I SERPL-SCNC: 1.23 MMOL/L — SIGNIFICANT CHANGE UP (ref 1.12–1.3)
CALCIUM SERPL-MCNC: 9.4 MG/DL — SIGNIFICANT CHANGE UP (ref 8.4–10.5)
CHLORIDE BLDV-SCNC: 102 MMOL/L — SIGNIFICANT CHANGE UP (ref 96–108)
CHLORIDE SERPL-SCNC: 101 MMOL/L — SIGNIFICANT CHANGE UP (ref 96–108)
CO2 BLDV-SCNC: 31 MMOL/L — HIGH (ref 22–30)
CO2 SERPL-SCNC: 27 MMOL/L — SIGNIFICANT CHANGE UP (ref 22–31)
CREAT SERPL-MCNC: 1.02 MG/DL — SIGNIFICANT CHANGE UP (ref 0.5–1.3)
EOSINOPHIL # BLD AUTO: 0.2 K/UL — SIGNIFICANT CHANGE UP (ref 0–0.5)
EOSINOPHIL NFR BLD AUTO: 2.2 % — SIGNIFICANT CHANGE UP (ref 0–6)
GAS PNL BLDV: 135 MMOL/L — LOW (ref 136–145)
GAS PNL BLDV: SIGNIFICANT CHANGE UP
GAS PNL BLDV: SIGNIFICANT CHANGE UP
GLUCOSE BLDV-MCNC: 112 MG/DL — HIGH (ref 70–99)
GLUCOSE SERPL-MCNC: 123 MG/DL — HIGH (ref 70–99)
HCO3 BLDV-SCNC: 29 MMOL/L — SIGNIFICANT CHANGE UP (ref 21–29)
HCT VFR BLD CALC: 26.9 % — LOW (ref 39–50)
HCT VFR BLDA CALC: 27 % — LOW (ref 39–50)
HGB BLD CALC-MCNC: 8.8 G/DL — LOW (ref 13–17)
HGB BLD-MCNC: 9.1 G/DL — LOW (ref 13–17)
LACTATE BLDV-MCNC: 1.6 MMOL/L — SIGNIFICANT CHANGE UP (ref 0.7–2)
LYMPHOCYTES # BLD AUTO: 2.3 K/UL — SIGNIFICANT CHANGE UP (ref 1–3.3)
LYMPHOCYTES # BLD AUTO: 32.8 % — SIGNIFICANT CHANGE UP (ref 13–44)
MCHC RBC-ENTMCNC: 29.3 PG — SIGNIFICANT CHANGE UP (ref 27–34)
MCHC RBC-ENTMCNC: 33.9 GM/DL — SIGNIFICANT CHANGE UP (ref 32–36)
MCV RBC AUTO: 86.6 FL — SIGNIFICANT CHANGE UP (ref 80–100)
MONOCYTES # BLD AUTO: 0.6 K/UL — SIGNIFICANT CHANGE UP (ref 0–0.9)
MONOCYTES NFR BLD AUTO: 8.4 % — SIGNIFICANT CHANGE UP (ref 2–14)
NEUTROPHILS # BLD AUTO: 3.9 K/UL — SIGNIFICANT CHANGE UP (ref 1.8–7.4)
NEUTROPHILS NFR BLD AUTO: 55.6 % — SIGNIFICANT CHANGE UP (ref 43–77)
OB PNL STL: POSITIVE
PCO2 BLDV: 54 MMHG — HIGH (ref 35–50)
PH BLDV: 7.35 — SIGNIFICANT CHANGE UP (ref 7.35–7.45)
PLATELET # BLD AUTO: 401 K/UL — HIGH (ref 150–400)
PO2 BLDV: 29 MMHG — SIGNIFICANT CHANGE UP (ref 25–45)
POTASSIUM BLDV-SCNC: 4.7 MMOL/L — SIGNIFICANT CHANGE UP (ref 3.5–5.3)
POTASSIUM SERPL-MCNC: 4.9 MMOL/L — SIGNIFICANT CHANGE UP (ref 3.5–5.3)
POTASSIUM SERPL-SCNC: 4.9 MMOL/L — SIGNIFICANT CHANGE UP (ref 3.5–5.3)
PROT SERPL-MCNC: 6.2 G/DL — SIGNIFICANT CHANGE UP (ref 6–8.3)
RBC # BLD: 3.11 M/UL — LOW (ref 4.2–5.8)
RBC # FLD: 12.6 % — SIGNIFICANT CHANGE UP (ref 10.3–14.5)
RH IG SCN BLD-IMP: POSITIVE — SIGNIFICANT CHANGE UP
SAO2 % BLDV: 36 % — LOW (ref 67–88)
SODIUM SERPL-SCNC: 137 MMOL/L — SIGNIFICANT CHANGE UP (ref 135–145)
WBC # BLD: 7 K/UL — SIGNIFICANT CHANGE UP (ref 3.8–10.5)
WBC # FLD AUTO: 7 K/UL — SIGNIFICANT CHANGE UP (ref 3.8–10.5)

## 2019-05-18 PROCEDURE — 82435 ASSAY OF BLOOD CHLORIDE: CPT

## 2019-05-18 PROCEDURE — 82803 BLOOD GASES ANY COMBINATION: CPT

## 2019-05-18 PROCEDURE — 86850 RBC ANTIBODY SCREEN: CPT

## 2019-05-18 PROCEDURE — 36415 COLL VENOUS BLD VENIPUNCTURE: CPT

## 2019-05-18 PROCEDURE — 84132 ASSAY OF SERUM POTASSIUM: CPT

## 2019-05-18 PROCEDURE — 84295 ASSAY OF SERUM SODIUM: CPT

## 2019-05-18 PROCEDURE — 82330 ASSAY OF CALCIUM: CPT

## 2019-05-18 PROCEDURE — 85014 HEMATOCRIT: CPT

## 2019-05-18 PROCEDURE — 99284 EMERGENCY DEPT VISIT MOD MDM: CPT

## 2019-05-18 PROCEDURE — 82272 OCCULT BLD FECES 1-3 TESTS: CPT

## 2019-05-18 PROCEDURE — 86901 BLOOD TYPING SEROLOGIC RH(D): CPT

## 2019-05-18 PROCEDURE — 82947 ASSAY GLUCOSE BLOOD QUANT: CPT

## 2019-05-18 PROCEDURE — 99283 EMERGENCY DEPT VISIT LOW MDM: CPT

## 2019-05-18 PROCEDURE — 86900 BLOOD TYPING SEROLOGIC ABO: CPT

## 2019-05-18 PROCEDURE — 80053 COMPREHEN METABOLIC PANEL: CPT

## 2019-05-18 PROCEDURE — 85027 COMPLETE CBC AUTOMATED: CPT

## 2019-05-18 PROCEDURE — 83605 ASSAY OF LACTIC ACID: CPT

## 2019-05-18 NOTE — ED PROVIDER NOTE - CLINICAL SUMMARY MEDICAL DECISION MAKING FREE TEXT BOX
60yo M hx htn presenting from PMD after labs showed Hgb 8.4. No signs or symptoms or exam findings. Guaiac, labs. If hgb stable would DC with GI f/u.

## 2019-05-18 NOTE — ED PROVIDER NOTE - NSFOLLOWUPINSTRUCTIONS_ED_ALL_ED_FT
-Follow-up with Gastroenterologist in 1-4 days. Call for appointment  -Follow-up with PMD later next week.   -Return to the Emergency Department for any worsening or concerning symptoms such as fevers, severe pain, trouble breathing, weakness or lightheadedness, blood in the stool.

## 2019-05-18 NOTE — ED PROVIDER NOTE - PHYSICAL EXAMINATION
Gen: No acute distress, alert, cooperative  HENT: Normocephalic, atraumatic.   Eyes: PERRLA, EOMI    Resp: CTAB, non-labored, speaking without difficulty on room air, no wheeze  CV: rrr, no murmur  Abd: soft, NTND, no rebound or guarding  : Stool brown on digital exam, no hemorrhoids  MSK: No CVAT bilaterally, no midline ttp, walking in ED with no issues  Skin: warm and dry  Neuro: AOx3, speech is fluent and appropriate, no focal deficits  Psych: Normal affect

## 2019-05-18 NOTE — ED PROVIDER NOTE - CARE PROVIDER_API CALL
Kareem Gilbert (DO)  Gastroenterology; Internal Medicine  [address] [place] [address]  Phone: [phone]  Fax: [phone]  Follow Up Time:

## 2019-05-18 NOTE — ED PROVIDER NOTE - NSFOLLOWUPCLINICS_GEN_ALL_ED_FT
[facility]  Gastroenterology  [address] [place] [id number]  Phone: [phone]  Fax:   Follow Up Time:

## 2019-05-18 NOTE — ED PROVIDER NOTE - OBJECTIVE STATEMENT
60yo M hx htn presenting from PMD after labs showed Hgb 8.4. Per chart review Hgb 8.8 on 5/13 and 8.4 on 5/17. Patient says they tested stool, possible guaiac positive. Patient was there for routine checkup, no symptoms, no dark/black stool, no abdominal pain, n/v/c/d/cp/sob/fatigue/dizziness. Had colonoscopy 2017 with negative biopsies.

## 2019-05-18 NOTE — ED ADULT NURSE NOTE - OBJECTIVE STATEMENT
Patient presents to Ed with c/o abnormal lab result. Patient reports having labs drawn yesterday in which his blood count  was 8.4 and his exam for rectal bleed was positive for blood per md. Patient denies any rectal  bleeding or weakness, denies chest  pain sob nausea vomiting fever chills headache or dizziness. Patient reports he feels completely fine. LAC 20g iv lock placed,  labs drawn and sent.

## 2019-05-18 NOTE — ED PROVIDER NOTE - ATTENDING CONTRIBUTION TO CARE
59M, pmh htn, presents with hgb 8.4, concern for anemia. Per HIE, pt had hgb 8.8 on 5/13, 8.4 on 5/17. Pt denies melena, hematoechezia, abd pain, lightheadedness, dizziness, palpitations, cp, sob, n/v/d, dysuria, hematuria, or any other complaints. "If I didn't have that bloodwork I wouldn't be here." Pt does have regularly scheduled appointment with his gastroenterologist next week.    PE: Well appearing male in NAD, NCAT, MMM, Trachea midline, Normal conjunctiva, lungs CTAB, S1/S2 RRR, Normal perfusion, 2+ radial pulses bilat, Abdomen Soft, NTND, No rebound/guarding, No LE edema, No deformity of extremities, No rashes,  No focal motor or sensory deficits.     Pt appears well. Guiac +, but patient endorsing no pain, stool brown. Exam unremarkable, hgb in ED 9.1. Pt has appointment with GI physician next week. Plan to d/c with outpatient f/u then. Return precautions d/w patient including s/s anemia, worsening bleeding, pt expresses understanding. An opportunity to ask questions was provided and all answered. To discharge. - Sree Johnson MD

## 2019-05-21 ENCOUNTER — APPOINTMENT (OUTPATIENT)
Dept: GASTROENTEROLOGY | Facility: CLINIC | Age: 59
End: 2019-05-21
Payer: MEDICAID

## 2019-05-21 VITALS
HEART RATE: 80 BPM | SYSTOLIC BLOOD PRESSURE: 113 MMHG | WEIGHT: 214 LBS | DIASTOLIC BLOOD PRESSURE: 78 MMHG | BODY MASS INDEX: 29.96 KG/M2 | HEIGHT: 71 IN

## 2019-05-21 DIAGNOSIS — E66.3 OVERWEIGHT: ICD-10-CM

## 2019-05-21 PROCEDURE — 82274 ASSAY TEST FOR BLOOD FECAL: CPT | Mod: QW

## 2019-05-21 PROCEDURE — 99215 OFFICE O/P EST HI 40 MIN: CPT

## 2019-05-21 RX ORDER — OMEPRAZOLE 20 MG/1
20 TABLET, DELAYED RELEASE ORAL DAILY
Qty: 30 | Refills: 0 | Status: DISCONTINUED | COMMUNITY
Start: 2019-05-20 | End: 2019-05-21

## 2019-05-21 RX ORDER — CHROMIUM 200 MCG
1000 TABLET ORAL
Refills: 0 | Status: DISCONTINUED | COMMUNITY
Start: 2018-12-19 | End: 2019-05-21

## 2019-05-21 RX ORDER — MULTIVIT-MIN/IRON/FOLIC ACID/K 18-600-40
CAPSULE ORAL
Refills: 0 | Status: ACTIVE | COMMUNITY

## 2019-05-21 NOTE — HISTORY OF PRESENT ILLNESS
[FreeTextEntry1] : Justin was recently found to have iron deficiency anemia and heme positive stool. He denies GI symptoms or ASA/NSAID use at this time. He had undergone probiotic extended right colectomy April 2017 for multiple cecal adenomata (last colonoscopy October 2016 by Dr. Moyer revealed tubulovillous adenomata with foci of high-grade dysplasia).

## 2019-05-21 NOTE — REASON FOR VISIT
[Follow-Up: _____] : a [unfilled] follow-up visit [FreeTextEntry1] : Blood found during rectal exam, low hemoglobin levels

## 2019-05-21 NOTE — ASSESSMENT
[FreeTextEntry1] : 1. Iron deficiency anemia with multiple guaiac-positive stools--rule out metachronous colorectal (or other GI) neoplasm, peptic ulcer, esophagitis, AVMs, etc.\par 2. Status post robotic extended right colectomy 2017 for large cecal adenomas (history of tubulovillous adenoma with foci of high-grade dysplasia, last colonoscopy 2016)\par 3. Overweight.\par 4. BPH, on Flomax.\par 5. History of alcoholism\par \par \par Plan:\par 1. Medical records reviewed.\par 2. Schedule repeat colonoscopy, with probable EGD to immediately follow--as his insurance  at the end of May, and he has appointment with Urology tomorrow, will try to arrange for panendoscopy on --he was advised to hold iron supplement for now. He was told that he must have a designated  available to take him home post-anesthesia, otherwise the scope(s) will be canceled. Procedures, rationale, alternatives, material risks, anesthesia plan, MiraLax prep instructions were reviewed and brochures given.\par 3. Possible capsule endoscopy of the small intestine if panendoscopy unrevealing.

## 2019-05-21 NOTE — PHYSICAL EXAM
[General Appearance - Alert] : alert [General Appearance - In No Acute Distress] : in no acute distress [General Appearance - Well Nourished] : well nourished [General Appearance - Well Developed] : well developed [Sclera] : the sclera and conjunctiva were normal [Outer Ear] : the ears and nose were normal in appearance [Oropharynx] : the oropharynx was normal [Neck Appearance] : the appearance of the neck was normal [Neck Cervical Mass (___cm)] : no neck mass was observed [Jugular Venous Distention Increased] : there was no jugular-venous distention [Thyroid Diffuse Enlargement] : the thyroid was not enlarged [Thyroid Nodule] : there were no palpable thyroid nodules [Auscultation Breath Sounds / Voice Sounds] : lungs were clear to auscultation bilaterally [Heart Rate And Rhythm] : heart rate was normal and rhythm regular [Heart Sounds] : normal S1 and S2 [Heart Sounds Gallop] : no gallops [Murmurs] : no murmurs [Heart Sounds Pericardial Friction Rub] : no pericardial rub [Full Pulse] : the pedal pulses are present [Edema] : there was no peripheral edema [Bowel Sounds] : normal bowel sounds [Abdomen Soft] : soft [Abdomen Tenderness] : non-tender [Abdomen Mass (___ Cm)] : no abdominal mass palpated [Abdomen Hernia] : no hernia was discovered [Normal Sphincter Tone] : normal sphincter tone [No Rectal Mass] : no rectal mass [Occult Blood Positive] : stool positive for occult blood [Prostate Size___ (Scale 0-4)] : prostate size was [unfilled] on a scale of 0-4 [Cervical Lymph Nodes Enlarged Posterior Bilaterally] : posterior cervical [Cervical Lymph Nodes Enlarged Anterior Bilaterally] : anterior cervical [Supraclavicular Lymph Nodes Enlarged Bilaterally] : supraclavicular [Axillary Lymph Nodes Enlarged Bilaterally] : axillary [Femoral Lymph Nodes Enlarged Bilaterally] : femoral [Inguinal Lymph Nodes Enlarged Bilaterally] : inguinal [No CVA Tenderness] : no ~M costovertebral angle tenderness [No Spinal Tenderness] : no spinal tenderness [Abnormal Walk] : normal gait [Nail Clubbing] : no clubbing  or cyanosis of the fingernails [Musculoskeletal - Swelling] : no joint swelling seen [Motor Tone] : muscle strength and tone were normal [Skin Color & Pigmentation] : normal skin color and pigmentation [Skin Turgor] : normal skin turgor [] : no rash [Oriented To Time, Place, And Person] : oriented to person, place, and time [Impaired Insight] : insight and judgment were intact [Affect] : the affect was normal [Internal Hemorrhoid] : no internal hemorrhoids [External Hemorrhoid] : no external hemorrhoids [FreeTextEntry1] : FIT negative [Prostate Tenderness] : was not tender

## 2019-05-21 NOTE — CONSULT LETTER
[Dear  ___] : Dear  [unfilled], [Courtesy Letter:] : I had the pleasure of seeing your patient, [unfilled], in my office today. [Please see my note below.] : Please see my note below. [Consult Closing:] : Thank you very much for allowing me to participate in the care of this patient.  If you have any questions, please do not hesitate to contact me. [Sincerely,] : Sincerely, [FreeTextEntry3] : Rd Garcia M.D.\par

## 2019-05-23 ENCOUNTER — APPOINTMENT (OUTPATIENT)
Dept: UROLOGY | Facility: CLINIC | Age: 59
End: 2019-05-23

## 2019-05-23 ENCOUNTER — LABORATORY RESULT (OUTPATIENT)
Age: 59
End: 2019-05-23

## 2019-05-24 ENCOUNTER — APPOINTMENT (OUTPATIENT)
Dept: GASTROENTEROLOGY | Facility: CLINIC | Age: 59
End: 2019-05-24
Payer: MEDICAID

## 2019-05-24 PROCEDURE — 43239 EGD BIOPSY SINGLE/MULTIPLE: CPT | Mod: 59

## 2019-05-24 PROCEDURE — 45380 COLONOSCOPY AND BIOPSY: CPT | Mod: 59

## 2019-05-24 PROCEDURE — 45385 COLONOSCOPY W/LESION REMOVAL: CPT

## 2019-06-03 ENCOUNTER — APPOINTMENT (OUTPATIENT)
Dept: SURGICAL ONCOLOGY | Facility: CLINIC | Age: 59
End: 2019-06-03
Payer: MEDICAID

## 2019-06-03 ENCOUNTER — OUTPATIENT (OUTPATIENT)
Dept: OUTPATIENT SERVICES | Facility: HOSPITAL | Age: 59
LOS: 1 days | Discharge: HOME | End: 2019-06-03

## 2019-06-03 VITALS
HEIGHT: 71 IN | HEART RATE: 78 BPM | WEIGHT: 210 LBS | SYSTOLIC BLOOD PRESSURE: 122 MMHG | BODY MASS INDEX: 29.4 KG/M2 | DIASTOLIC BLOOD PRESSURE: 70 MMHG | OXYGEN SATURATION: 98 %

## 2019-06-03 DIAGNOSIS — K63.5 POLYP OF COLON: Chronic | ICD-10-CM

## 2019-06-03 DIAGNOSIS — N46.9 MALE INFERTILITY, UNSPECIFIED: Chronic | ICD-10-CM

## 2019-06-03 DIAGNOSIS — D64.9 ANEMIA, UNSPECIFIED: ICD-10-CM

## 2019-06-03 PROCEDURE — 99215 OFFICE O/P EST HI 40 MIN: CPT

## 2019-06-03 NOTE — HISTORY OF PRESENT ILLNESS
[de-identified] : 59 year-old male returns for follow up.  Initial baseline screening colonoscopy was performed by Dr. Rd Garcia in September 2016, which showed numerous sizeable polyps in the right colon.  Pathology of all polyps was consistent with fragments of tubular adenoma.  He subsequently underwent a colonoscopy with Dr. Markham for polyp removal in October 2016.  At that time, he was also found to have multiple polyps involving the descending colon, which were not excised given already large number of polyps removed from the ascending colon.  Pathology of one polyp found in the cecum demonstrated tubulovillous adenoma with foci of high grade dysplasia.  He also had a CT in September 2016 which showed no evidence of malignancy but some suggestion of possible gallstones and equivocal findings in the bladder for which pelvic sono was recommended but not performed.\par \par He is s/p robotic extended right colectomy 4/7/17.  Final pathology demonstrated 3 tubular adenomas, no high grade dysplasia or invasive carcinoma, cecal submucosal lipoma with overlying hyperplastic polyps, and 20 negative lymph nodes.\par \par Most recent colonoscopy was performed by Dr. Carmelo Marr on 5/24/19 to evaluate new onset of anemia- Dr. Garcia was not available to do the exam at that time.  Exam revealed 2 hard areas of nodular mucosa at the ileocolonic anastomosis which were biopsied and consistent with adenocarcinoma with LVI present.  A partially obstructing mass was seen in the descending colon at approximately 50 cm which measured 2-3 cm in length.  Pediatric scope was required to traverse the mass.  Pathology was also adenocarcinoma.  The colon proximal to the mass had copious stool which limited evaluation.  There was a 1 cm polyp in the transverse colon but polypectomy was not attempted given findings of the mass in the descending colon. \par \par Dr. Marr has arranged for a CT of the chest, abdomen and pelvis to be done on 6/5/19.\par \par Most recent H&H 5/17/19= 8.4/26.9.  He is currently receiving oral iron supplementation per his PCP. \par \par He is feeling well overall.  He has not noticed any blood in his stool recently.  He denies bowel habit changes, thin caliber stools, abdominal pain, nausea or vomiting. \par \par His PMH is otherwise unremarkable.  He has no prior surgeries and no family history of malignancies.  He does not smoke or drink alcohol.  \par \par PCP: Dr. Daina Nj\par Referring MD/GI: Dr. Rd Garcia\par GI: Dr. Rock Markham, Dr. Carmelo Marr

## 2019-06-03 NOTE — PHYSICAL EXAM
[Normal] : supple, no neck mass and thyroid not enlarged [Normal Neck Lymph Nodes] : normal neck lymph nodes  [Normal Supraclavicular Lymph Nodes] : normal supraclavicular lymph nodes [Normal] : oriented to person, place and time, with appropriate affect [de-identified] : Trochar sites well-healed, no mass or hernia.

## 2019-06-03 NOTE — ASSESSMENT
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 Marr.\par Will send comprehensive genetic testing panel. \par Options, risks and benefits of surgery discussed with patient.

## 2019-06-03 NOTE — CONSULT LETTER
[Dear  ___] : Dear  [unfilled], [Consult Letter:] : I had the pleasure of evaluating your patient, [unfilled]. [Consult Closing:] : Thank you very much for allowing me to participate in the care of this patient.  If you have any questions, please do not hesitate to contact me. [Sincerely,] : Sincerely, [DrScott  ___] : Dr. HILL [FreeTextEntry1] : 59 year-old male returns for follow up.  Initial baseline screening colonoscopy was performed by Dr. Rd Garcia in September 2016, which showed numerous sizeable polyps in the right colon.  Pathology of all polyps was consistent with fragments of tubular adenoma.  He subsequently underwent a colonoscopy with Dr. Markham for polyp removal in October 2016.  At that time, he was also found to have multiple polyps involving the descending colon, which were not excised given already large number of polyps removed from the ascending colon.  Pathology of one polyp found in the cecum demonstrated tubulovillous adenoma with foci of high grade dysplasia.  He also had a CT in September 2016 which showed no evidence of malignancy but some suggestion of possible gallstones and equivocal findings in the bladder for which pelvic sono was recommended but not performed.\par \par He is s/p robotic extended right colectomy 4/7/17.  Final pathology demonstrated 3 tubular adenomas, no high grade dysplasia or invasive carcinoma, cecal submucosal lipoma with overlying hyperplastic polyps, and 20 negative lymph nodes.\par \par Most recent colonoscopy was performed by Dr. Carmelo Marr on 5/24/19 to evaluate new onset of anemia- Dr. Garcia was not available to do the exam at that time.  Exam revealed 2 hard areas of nodular mucosa at the ileocolonic anastomosis which were biopsied and consistent with adenocarcinoma with LVI present.  A partially obstructing mass was seen in the descending colon at approximately 50 cm which measured 2-3 cm in length.  Pediatric scope was required to traverse the mass.  Pathology was also adenocarcinoma.  The colon proximal to the mass had copious stool which limited evaluation.  There was a 1 cm polyp in the transverse colon but polypectomy was not attempted given findings of the mass in the descending colon. \par \par Dr. Marr has arranged for a CT of the chest, abdomen and pelvis to be done on 6/5/19.\par \par Most recent H&H 5/17/19= 8.4/26.9.  He is currently receiving oral iron supplementation per his PCP. \par \par He is feeling well overall.  He has not noticed any blood in his stool recently.  He denies bowel habit changes, thin caliber stools, abdominal pain, nausea or vomiting. \par \par His PMH is otherwise unremarkable.  He has no prior surgeries and no family history of malignancies.  He does not smoke or drink alcohol.  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 Marr.\par Will send comprehensive genetic testing panel. \par Options, risks and benefits of surgery discussed with patient. \par \par PCP: Dr. Daina Nj\par Referring MD/GI: Dr. Rd Garcia\par GI: Dr. Rock Markham, Dr. Carmelo Marr [FreeTextEntry2] : Rd Garcia MD [FreeTextEntry3] : Dean Cooley MD, FACS, FASCRS\par , Department of Surgery\par Director of the [facility] [place]\par , Minimally Invasive/Robotic Cancer Surgery, Central & [facility] Divisions\par Division of Surgical Oncology

## 2019-06-04 ENCOUNTER — FORM ENCOUNTER (OUTPATIENT)
Age: 59
End: 2019-06-04

## 2019-06-05 ENCOUNTER — OUTPATIENT (OUTPATIENT)
Dept: OUTPATIENT SERVICES | Facility: HOSPITAL | Age: 59
LOS: 1 days | End: 2019-06-05
Payer: MEDICAID

## 2019-06-05 ENCOUNTER — APPOINTMENT (OUTPATIENT)
Dept: CT IMAGING | Facility: IMAGING CENTER | Age: 59
End: 2019-06-05
Payer: MEDICAID

## 2019-06-05 DIAGNOSIS — N46.9 MALE INFERTILITY, UNSPECIFIED: Chronic | ICD-10-CM

## 2019-06-05 DIAGNOSIS — K63.5 POLYP OF COLON: Chronic | ICD-10-CM

## 2019-06-05 DIAGNOSIS — C18.9 MALIGNANT NEOPLASM OF COLON, UNSPECIFIED: ICD-10-CM

## 2019-06-05 PROCEDURE — 71260 CT THORAX DX C+: CPT | Mod: 26

## 2019-06-05 PROCEDURE — 74177 CT ABD & PELVIS W/CONTRAST: CPT

## 2019-06-05 PROCEDURE — 74177 CT ABD & PELVIS W/CONTRAST: CPT | Mod: 26

## 2019-06-05 PROCEDURE — 71260 CT THORAX DX C+: CPT

## 2019-06-07 ENCOUNTER — APPOINTMENT (OUTPATIENT)
Dept: HEMATOLOGY ONCOLOGY | Facility: CLINIC | Age: 59
End: 2019-06-07

## 2019-07-19 NOTE — PATIENT PROFILE ADULT. - URINARY CATHETER
July 19, 2019     Patient: Josh Ballesteros   YOB: 1963   Date of Visit: 7/19/2019       To Whom it May Concern:    James Quesada is under my professional care  He was seen in my office on 7/19/2019  The patient can return to work with restrictions of no kneeling or crawling  If you have any questions or concerns, please don't hesitate to call           Sincerely,          Gucci Cerna MD        CC: No Recipients no

## 2019-07-24 ENCOUNTER — RESULT REVIEW (OUTPATIENT)
Age: 59
End: 2019-07-24

## 2019-07-24 ENCOUNTER — OUTPATIENT (OUTPATIENT)
Dept: OUTPATIENT SERVICES | Facility: HOSPITAL | Age: 59
LOS: 1 days | Discharge: HOME | End: 2019-07-24

## 2019-07-24 ENCOUNTER — APPOINTMENT (OUTPATIENT)
Dept: SURGICAL ONCOLOGY | Facility: CLINIC | Age: 59
End: 2019-07-24
Payer: MEDICAID

## 2019-07-24 ENCOUNTER — APPOINTMENT (OUTPATIENT)
Dept: HEMATOLOGY ONCOLOGY | Facility: CLINIC | Age: 59
End: 2019-07-24

## 2019-07-24 ENCOUNTER — LABORATORY RESULT (OUTPATIENT)
Age: 59
End: 2019-07-24

## 2019-07-24 VITALS
HEART RATE: 94 BPM | SYSTOLIC BLOOD PRESSURE: 126 MMHG | HEIGHT: 71 IN | BODY MASS INDEX: 28.28 KG/M2 | WEIGHT: 202 LBS | DIASTOLIC BLOOD PRESSURE: 81 MMHG

## 2019-07-24 DIAGNOSIS — D64.9 ANEMIA, UNSPECIFIED: ICD-10-CM

## 2019-07-24 DIAGNOSIS — K63.5 POLYP OF COLON: Chronic | ICD-10-CM

## 2019-07-24 DIAGNOSIS — D12.6 BENIGN NEOPLASM OF COLON, UNSPECIFIED: ICD-10-CM

## 2019-07-24 DIAGNOSIS — N46.9 MALE INFERTILITY, UNSPECIFIED: Chronic | ICD-10-CM

## 2019-07-24 LAB
BASOPHILS # BLD AUTO: 0.1 K/UL — SIGNIFICANT CHANGE UP (ref 0–0.2)
BASOPHILS NFR BLD AUTO: 1.2 % — SIGNIFICANT CHANGE UP (ref 0–2)
EOSINOPHIL # BLD AUTO: 0.2 K/UL — SIGNIFICANT CHANGE UP (ref 0–0.5)
EOSINOPHIL NFR BLD AUTO: 2 % — SIGNIFICANT CHANGE UP (ref 0–6)
HCT VFR BLD CALC: 36.4 % — LOW (ref 39–50)
HGB BLD-MCNC: 11.3 G/DL — LOW (ref 13–17)
LYMPHOCYTES # BLD AUTO: 1.8 K/UL — SIGNIFICANT CHANGE UP (ref 1–3.3)
LYMPHOCYTES # BLD AUTO: 22.4 % — SIGNIFICANT CHANGE UP (ref 13–44)
MCHC RBC-ENTMCNC: 27.2 PG — SIGNIFICANT CHANGE UP (ref 27–34)
MCHC RBC-ENTMCNC: 31.2 G/DL — LOW (ref 32–36)
MCV RBC AUTO: 87.4 FL — SIGNIFICANT CHANGE UP (ref 80–100)
MONOCYTES # BLD AUTO: 0.5 K/UL — SIGNIFICANT CHANGE UP (ref 0–0.9)
MONOCYTES NFR BLD AUTO: 6 % — SIGNIFICANT CHANGE UP (ref 2–14)
NEUTROPHILS # BLD AUTO: 5.5 K/UL — SIGNIFICANT CHANGE UP (ref 1.8–7.4)
NEUTROPHILS NFR BLD AUTO: 68.5 % — SIGNIFICANT CHANGE UP (ref 43–77)
PLATELET # BLD AUTO: 421 K/UL — HIGH (ref 150–400)
RBC # BLD: 4.16 M/UL — LOW (ref 4.2–5.8)
RBC # FLD: 15.4 % — HIGH (ref 10.3–14.5)
WBC # BLD: 8 K/UL — SIGNIFICANT CHANGE UP (ref 3.8–10.5)
WBC # FLD AUTO: 8 K/UL — SIGNIFICANT CHANGE UP (ref 3.8–10.5)

## 2019-07-24 PROCEDURE — 99214 OFFICE O/P EST MOD 30 MIN: CPT

## 2019-07-24 NOTE — PHYSICAL EXAM
[Normal] : supple, no neck mass and thyroid not enlarged [Normal Supraclavicular Lymph Nodes] : normal supraclavicular lymph nodes [Normal Neck Lymph Nodes] : normal neck lymph nodes  [Normal] : grossly intact [de-identified] : Trochar sites well-healed, no mass or hernia.

## 2019-07-24 NOTE — HISTORY OF PRESENT ILLNESS
[de-identified] : 59 year-old male returns for follow up.  Initial baseline screening colonoscopy was performed by Dr. Rd Garcia in September 2016, which showed numerous sizeable polyps in the right colon.  Pathology of all polyps was consistent with fragments of tubular adenoma.  He subsequently underwent a colonoscopy with Dr. Markham for polyp removal in October 2016.  At that time, he was also found to have multiple polyps involving the descending colon, which were not excised given already large number of polyps removed from the ascending colon.  Pathology of one polyp found in the cecum demonstrated tubulovillous adenoma with foci of high grade dysplasia.  He also had a CT in September 2016 which showed no evidence of malignancy but some suggestion of possible gallstones and equivocal findings in the bladder for which pelvic sono was recommended but not performed.\par \par He is s/p robotic extended right colectomy 4/7/17.  Final pathology demonstrated 3 tubular adenomas, no high grade dysplasia or invasive carcinoma, cecal submucosal lipoma with overlying hyperplastic polyps, and 20 negative lymph nodes.\par \par Most recent colonoscopy was performed by Dr. Carmelo Marr on 5/24/19 to evaluate new onset of anemia- Dr. Garcia was not available to do the exam at that time.  Exam revealed 2 hard areas of nodular mucosa at the ileocolonic anastomosis which were biopsied and consistent with adenocarcinoma with LVI present.  A partially obstructing mass was seen in the descending colon at approximately 50 cm which measured 2-3 cm in length.  Pediatric scope was required to traverse the mass.  Pathology was also adenocarcinoma.  The colon proximal to the mass had copious stool which limited evaluation.  There was a 1 cm polyp in the transverse colon but polypectomy was not attempted given findings of the mass in the descending colon. \par \par A CT of the chest, abdomen and pelvis on 6/5/19 revealed lesions in the right hepatic lobe measuring 1.1 cm and 1 cm, and an additional suspected 1.6 cm lesion concerning for metastatic disease.  There is wall thickening involving the left colon and prominent lymph nodes surrounding the bowel anastomosis.  There is non specific stranding along the course of the distal left ureter and urinalysis was recommended to exclude underlying infection. \par \par Most recent H&H 5/17/19= 8.4/26.9.  He is currently receiving oral iron supplementation per his PCP. \par \par He is feeling well overall.  He has not noticed any blood in his stool recently.  He denies bowel habit changes, thin caliber stools, abdominal pain, nausea or vomiting. \par \par His PMH is otherwise unremarkable.  He has no prior surgeries and no family history of malignancies.  \par \par He is in recovery from alcohol addition and recently completed 3 weeks of in patient therapy. \par \par He is feeling well and denies abdominal pain, nausea/vomiting, hematochezia or bowel habit changes. \par \par PCP: Dr. Daina Nj\par Referring MD/GI: Dr. Rd Garcia\par GI: Dr. Rock Markham, Dr. Carmelo Marr

## 2019-07-24 NOTE — ASSESSMENT
[FreeTextEntry1] : Adenocarcinoma of the ileocolonic anastomosis and descending colon.\par Suspected liver metastasis on CT, will get PET/CT for further evaluation.\par Will likely need open resection.\par Options, risks and benefits of surgery discussed with patient.

## 2019-07-24 NOTE — CONSULT LETTER
[Dear  ___] : Dear  [unfilled], [Consult Closing:] : Thank you very much for allowing me to participate in the care of this patient.  If you have any questions, please do not hesitate to contact me. [Consult Letter:] : I had the pleasure of evaluating your patient, [unfilled]. [Sincerely,] : Sincerely, [DrScott  ___] : Dr. HILL [FreeTextEntry3] : Dean Cooley MD, FACS, FASCRS\par , Department of Surgery\par Director of the [facility] [place]\par , Minimally Invasive/Robotic Cancer Surgery, Central & Eastern Divisions\par Division of Surgical Oncology  [FreeTextEntry2] : Rd Garcia MD [FreeTextEntry1] : 59 year-old male returns for follow up.  Initial baseline screening colonoscopy was performed by Dr. Rd Garcia in September 2016, which showed numerous sizeable polyps in the right colon.  Pathology of all polyps was consistent with fragments of tubular adenoma.  He subsequently underwent a colonoscopy with Dr. Markham for polyp removal in October 2016.  At that time, he was also found to have multiple polyps involving the descending colon, which were not excised given already large number of polyps removed from the ascending colon.  Pathology of one polyp found in the cecum demonstrated tubulovillous adenoma with foci of high grade dysplasia.  He also had a CT in September 2016 which showed no evidence of malignancy but some suggestion of possible gallstones and equivocal findings in the bladder for which pelvic sono was recommended but not performed.\par \par He is s/p robotic extended right colectomy 4/7/17.  Final pathology demonstrated 3 tubular adenomas, no high grade dysplasia or invasive carcinoma, cecal submucosal lipoma with overlying hyperplastic polyps, and 20 negative lymph nodes.\par \par Most recent colonoscopy was performed by Dr. Carmelo Marr on 5/24/19 to evaluate new onset of anemia- Dr. Garcia was not available to do the exam at that time.  Exam revealed 2 hard areas of nodular mucosa at the ileocolonic anastomosis which were biopsied and consistent with adenocarcinoma with LVI present.  A partially obstructing mass was seen in the descending colon at approximately 50 cm which measured 2-3 cm in length.  Pediatric scope was required to traverse the mass.  Pathology was also adenocarcinoma.  The colon proximal to the mass had copious stool which limited evaluation.  There was a 1 cm polyp in the transverse colon but polypectomy was not attempted given findings of the mass in the descending colon. \par \par A CT of the chest, abdomen and pelvis on 6/5/19 revealed lesions in the right hepatic lobe measuring 1.1 cm and 1 cm, and an additional suspected 1.6 cm lesion concerning for metastatic disease.  There is wall thickening involving the left colon and prominent lymph nodes surrounding the bowel anastomosis.  There is non specific stranding along the course of the distal left ureter and urinalysis was recommended to exclude underlying infection. \par \par Most recent H&H 5/17/19= 8.4/26.9.  He is currently receiving oral iron supplementation per his PCP. \par \par He is feeling well overall.  He has not noticed any blood in his stool recently.  He denies bowel habit changes, thin caliber stools, abdominal pain, nausea or vomiting. \par \par His PMH is otherwise unremarkable.  He has no prior surgeries and no family history of malignancies.  \par \par He is in recovery from alcohol addition and recently completed 3 weeks of in patient therapy. 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 \par \par PCP: Dr. Daina Nj\par Referring MD/GI: Dr. Rd Garcia\par GI: Dr. Rock Markham, Dr. Carmelo Marr

## 2019-07-29 ENCOUNTER — RX RENEWAL (OUTPATIENT)
Age: 59
End: 2019-07-29

## 2019-07-31 ENCOUNTER — FORM ENCOUNTER (OUTPATIENT)
Age: 59
End: 2019-07-31

## 2019-08-01 ENCOUNTER — OUTPATIENT (OUTPATIENT)
Dept: OUTPATIENT SERVICES | Facility: HOSPITAL | Age: 59
LOS: 1 days | End: 2019-08-01

## 2019-08-01 ENCOUNTER — APPOINTMENT (OUTPATIENT)
Dept: NUCLEAR MEDICINE | Facility: CLINIC | Age: 59
End: 2019-08-01
Payer: MEDICAID

## 2019-08-01 DIAGNOSIS — C18.9 MALIGNANT NEOPLASM OF COLON, UNSPECIFIED: ICD-10-CM

## 2019-08-01 DIAGNOSIS — K63.5 POLYP OF COLON: Chronic | ICD-10-CM

## 2019-08-01 DIAGNOSIS — N46.9 MALE INFERTILITY, UNSPECIFIED: Chronic | ICD-10-CM

## 2019-08-01 PROCEDURE — 78815 PET IMAGE W/CT SKULL-THIGH: CPT | Mod: 26,PI

## 2019-08-15 ENCOUNTER — TRANSCRIPTION ENCOUNTER (OUTPATIENT)
Age: 59
End: 2019-08-15

## 2019-08-28 ENCOUNTER — OUTPATIENT (OUTPATIENT)
Dept: OUTPATIENT SERVICES | Facility: HOSPITAL | Age: 59
LOS: 1 days | End: 2019-08-28
Payer: MEDICAID

## 2019-08-28 VITALS
OXYGEN SATURATION: 98 % | SYSTOLIC BLOOD PRESSURE: 120 MMHG | WEIGHT: 197.09 LBS | HEART RATE: 89 BPM | HEIGHT: 69.5 IN | DIASTOLIC BLOOD PRESSURE: 86 MMHG | RESPIRATION RATE: 16 BRPM | TEMPERATURE: 98 F

## 2019-08-28 DIAGNOSIS — N46.9 MALE INFERTILITY, UNSPECIFIED: Chronic | ICD-10-CM

## 2019-08-28 DIAGNOSIS — K63.5 POLYP OF COLON: Chronic | ICD-10-CM

## 2019-08-28 DIAGNOSIS — C18.9 MALIGNANT NEOPLASM OF COLON, UNSPECIFIED: ICD-10-CM

## 2019-08-28 DIAGNOSIS — G47.33 OBSTRUCTIVE SLEEP APNEA (ADULT) (PEDIATRIC): Chronic | ICD-10-CM

## 2019-08-28 DIAGNOSIS — G47.33 OBSTRUCTIVE SLEEP APNEA (ADULT) (PEDIATRIC): ICD-10-CM

## 2019-08-28 DIAGNOSIS — F10.10 ALCOHOL ABUSE, UNCOMPLICATED: ICD-10-CM

## 2019-08-28 DIAGNOSIS — Z90.49 ACQUIRED ABSENCE OF OTHER SPECIFIED PARTS OF DIGESTIVE TRACT: Chronic | ICD-10-CM

## 2019-08-28 LAB
ALBUMIN SERPL ELPH-MCNC: 4.5 G/DL — SIGNIFICANT CHANGE UP (ref 3.3–5)
ALP SERPL-CCNC: 55 U/L — SIGNIFICANT CHANGE UP (ref 40–120)
ALT FLD-CCNC: 15 U/L — SIGNIFICANT CHANGE UP (ref 4–41)
ANION GAP SERPL CALC-SCNC: 13 MMO/L — SIGNIFICANT CHANGE UP (ref 7–14)
APTT BLD: 27.5 SEC — SIGNIFICANT CHANGE UP (ref 27.5–36.3)
AST SERPL-CCNC: 19 U/L — SIGNIFICANT CHANGE UP (ref 4–40)
BILIRUB SERPL-MCNC: 0.4 MG/DL — SIGNIFICANT CHANGE UP (ref 0.2–1.2)
BUN SERPL-MCNC: 21 MG/DL — SIGNIFICANT CHANGE UP (ref 7–23)
CALCIUM SERPL-MCNC: 9.9 MG/DL — SIGNIFICANT CHANGE UP (ref 8.4–10.5)
CHLORIDE SERPL-SCNC: 101 MMOL/L — SIGNIFICANT CHANGE UP (ref 98–107)
CO2 SERPL-SCNC: 24 MMOL/L — SIGNIFICANT CHANGE UP (ref 22–31)
CREAT SERPL-MCNC: 0.88 MG/DL — SIGNIFICANT CHANGE UP (ref 0.5–1.3)
GLUCOSE SERPL-MCNC: 107 MG/DL — HIGH (ref 70–99)
HCT VFR BLD CALC: 30.8 % — LOW (ref 39–50)
HGB BLD-MCNC: 9.6 G/DL — LOW (ref 13–17)
INR BLD: 1 — SIGNIFICANT CHANGE UP (ref 0.88–1.17)
MCHC RBC-ENTMCNC: 26.7 PG — LOW (ref 27–34)
MCHC RBC-ENTMCNC: 31.2 % — LOW (ref 32–36)
MCV RBC AUTO: 85.6 FL — SIGNIFICANT CHANGE UP (ref 80–100)
NRBC # FLD: 0 K/UL — SIGNIFICANT CHANGE UP (ref 0–0)
PLATELET # BLD AUTO: 408 K/UL — HIGH (ref 150–400)
PMV BLD: 9.9 FL — SIGNIFICANT CHANGE UP (ref 7–13)
POTASSIUM SERPL-MCNC: 4.6 MMOL/L — SIGNIFICANT CHANGE UP (ref 3.5–5.3)
POTASSIUM SERPL-SCNC: 4.6 MMOL/L — SIGNIFICANT CHANGE UP (ref 3.5–5.3)
PROT SERPL-MCNC: 6.9 G/DL — SIGNIFICANT CHANGE UP (ref 6–8.3)
PROTHROM AB SERPL-ACNC: 11.1 SEC — SIGNIFICANT CHANGE UP (ref 9.8–13.1)
RBC # BLD: 3.6 M/UL — LOW (ref 4.2–5.8)
RBC # FLD: 15.5 % — HIGH (ref 10.3–14.5)
SODIUM SERPL-SCNC: 138 MMOL/L — SIGNIFICANT CHANGE UP (ref 135–145)
WBC # BLD: 7.74 K/UL — SIGNIFICANT CHANGE UP (ref 3.8–10.5)
WBC # FLD AUTO: 7.74 K/UL — SIGNIFICANT CHANGE UP (ref 3.8–10.5)

## 2019-08-28 PROCEDURE — 93010 ELECTROCARDIOGRAM REPORT: CPT

## 2019-08-28 NOTE — H&P PST ADULT - NEGATIVE GENERAL SYMPTOMS
no fever/no chills/no sweating no chills/no sweating/no fever/no anorexia/no weight gain/no weight loss

## 2019-08-28 NOTE — H&P PST ADULT - NEGATIVE NEUROLOGICAL SYMPTOMS
no generalized seizures/no focal seizures/no cva/no headache no paresthesias/no weakness/no generalized seizures/no headache/no transient paralysis/no cva/no focal seizures

## 2019-08-28 NOTE — H&P PST ADULT - NSALCOHOLUSECOMMENT_GEN_ALL_CORE_FT
relapsed -- quit drinking 3-14-17 h/o etoh abuse x 5 years ; pt dc d 3/18 ; pt now having etoh 3-4 hs ; pt states to dc 8/28/19 h/o etoh abuse x 5 years ; pt dc d 3/18 ; pt now having etoh 3-4  some days  ; pt states  dcd  8/28/19

## 2019-08-28 NOTE — H&P PST ADULT - NSICDXFAMILYHX_GEN_ALL_CORE_FT
FAMILY HISTORY:  Mother  Still living? Unknown  Family history of lung cancer, Age at diagnosis: Age Unknown

## 2019-08-28 NOTE — H&P PST ADULT - MALLAMPATI CLASS
I  with phonation I  with phonation/Class II - visualization of the soft palate, fauces, and uvula Class II - visualization of the soft palate, fauces, and uvula

## 2019-08-28 NOTE — H&P PST ADULT - NSICDXPASTSURGICALHX_GEN_ALL_CORE_FT
PAST SURGICAL HISTORY:  Colon polyp colonoscopy -- negative biopsies in 2017    Infertility, male, severe vascular procedure for infertility    No significant past surgical history PAST SURGICAL HISTORY:  Colon polyp colonoscopy -- negative biopsies in 2017    Infertility, male, severe vascular procedure for infertility    S/P partial colectomy 3/17 PAST SURGICAL HISTORY:  Colon polyp colonoscopy -- negative biopsies in 2017    Infertility, male, severe vascular procedure for infertility    S/P partial colectomy Robotic Assisted Right Colectomy

## 2019-08-28 NOTE — H&P PST ADULT - NSICDXPASTMEDICALHX_GEN_ALL_CORE_FT
PAST MEDICAL HISTORY:  Alcohol abuse     Alcohol abuse relapse and last drink was 3-14-17. Had been sober for 168 days prior to drink on 3-14-17    Colon polyp multiple    Colon polyps     HTN (hypertension)     Hypertension "labile" -- not on medication as of 30-22-17    Snoring     Snoring ANSON precautions -- responds affirmatively to STOP BANG questionnaire -- admits to loud snoring; age > 50; gender, male; elevated BP at PAST office PAST MEDICAL HISTORY:  Alcohol abuse     Alcohol abuse relapse and last drink was 3-14-17. Had been sober for 168 days prior to drink on 3-14-17    Colon polyp multiple    Colon polyps     Hypertension "labile" -- not on medication as of 30-22-17    Snoring     Snoring ANSON precautions -- responds affirmatively to STOP BANG questionnaire -- admits to loud snoring; age > 50; gender, male; elevated BP at PAST office

## 2019-08-28 NOTE — H&P PST ADULT - NSICDXPROBLEM_GEN_ALL_CORE_FT
PROBLEM DIAGNOSES  Problem: ETOH abuse  Assessment and Plan: Pt with h/o etoh abuse; pt reports recent occasional vodka [ 3-4] pt dc d 8/27/19  Etoh precautions  Dr Cooley office  notified     Problem: Malignant neoplasm of colon  Assessment and Plan: Mediport Insertion   Pre op instructions reviewed with pt ; including Hibiclens with teach back ; pt verbalized good understanding of pre op instructions    Problem: ANSON (obstructive sleep apnea)  Assessment and Plan: ANSON Precautions  OR booking notified via fax PROBLEM DIAGNOSES  Problem: Malignant neoplasm of colon  Assessment and Plan: Mediport Insertion   Pre op instructions reviewed with pt ; including Hibiclens with teach back ; pt verbalized good understanding of pre op instructions    Problem: ETOH abuse  Assessment and Plan: Pt with h/o etoh abuse; pt reports recent occasional vodka [ 3-4] pt dc d 8/27/19  Etoh precautions    Problem: ANSON (obstructive sleep apnea)  Assessment and Plan: ANSON Precautions  OR booking notified via fax

## 2019-08-29 ENCOUNTER — FORM ENCOUNTER (OUTPATIENT)
Age: 59
End: 2019-08-29

## 2019-08-30 ENCOUNTER — OUTPATIENT (OUTPATIENT)
Dept: OUTPATIENT SERVICES | Facility: HOSPITAL | Age: 59
LOS: 1 days | Discharge: HOME | End: 2019-08-30
Payer: MEDICAID

## 2019-08-30 ENCOUNTER — APPOINTMENT (OUTPATIENT)
Dept: SURGICAL ONCOLOGY | Facility: AMBULATORY SURGERY CENTER | Age: 59
End: 2019-08-30

## 2019-08-30 VITALS
OXYGEN SATURATION: 96 % | HEART RATE: 95 BPM | WEIGHT: 197.09 LBS | DIASTOLIC BLOOD PRESSURE: 84 MMHG | RESPIRATION RATE: 18 BRPM | SYSTOLIC BLOOD PRESSURE: 150 MMHG | HEIGHT: 69.5 IN | TEMPERATURE: 99 F

## 2019-08-30 VITALS
SYSTOLIC BLOOD PRESSURE: 136 MMHG | RESPIRATION RATE: 16 BRPM | TEMPERATURE: 98 F | HEART RATE: 91 BPM | DIASTOLIC BLOOD PRESSURE: 82 MMHG | OXYGEN SATURATION: 100 %

## 2019-08-30 DIAGNOSIS — K63.5 POLYP OF COLON: Chronic | ICD-10-CM

## 2019-08-30 DIAGNOSIS — N46.9 MALE INFERTILITY, UNSPECIFIED: Chronic | ICD-10-CM

## 2019-08-30 DIAGNOSIS — C18.9 MALIGNANT NEOPLASM OF COLON, UNSPECIFIED: ICD-10-CM

## 2019-08-30 DIAGNOSIS — Z90.49 ACQUIRED ABSENCE OF OTHER SPECIFIED PARTS OF DIGESTIVE TRACT: Chronic | ICD-10-CM

## 2019-08-30 LAB — CEA SERPL-MCNC: 12.3 NG/ML

## 2019-08-30 PROCEDURE — 71045 X-RAY EXAM CHEST 1 VIEW: CPT | Mod: 26

## 2019-08-30 PROCEDURE — 36561 INSERT TUNNELED CV CATH: CPT | Mod: RT

## 2019-08-30 NOTE — BRIEF OPERATIVE NOTE - OPERATION/FINDINGS
Mediport catheter introduced to R subclavian vein using Seldinger technique under fluoroscopic guidance. Mediport placed under subcutaneous tissue. Catheter flushed with heparinized saline. Post-op chest Xray obtained, confirming placement.

## 2019-08-30 NOTE — BRIEF OPERATIVE NOTE - NSICDXBRIEFPROCEDURE_GEN_ALL_CORE_FT
PROCEDURES:  Fluoroscopic guidance for placement of central venous access device with x-ray of chest, single frontal view 30-Aug-2019 12:34:08  Brittany Preciado

## 2019-08-30 NOTE — ASU DISCHARGE PLAN (ADULT/PEDIATRIC) - CARE PROVIDER_API CALL
Dean Cooley)  ColonRectal Surgery; Surgery  [address] [place] [address]  Phone: [phone]  Fax: [phone]  Follow Up Time: 2 weeks

## 2019-08-30 NOTE — ASU DISCHARGE PLAN (ADULT/PEDIATRIC) - ASU DC SPECIAL INSTRUCTIONSFT
IF you have steri strips, you may shower with steri strips on. After showering, pat dry steri strips.  Do not rub them.  They will curl up and fall off by themselves within 7 days. Avoid using creams, lotions, or ointments around the steri strip area.

## 2019-08-30 NOTE — ASU DISCHARGE PLAN (ADULT/PEDIATRIC) - NURSING INSTRUCTIONS
You were given IV antibiotics in the OR. IF you are prescribed oral antibiotics to take at home. Please follow directions on the bottle and start your first dose of antibiotics before bedtime, and schedule your own timing until you finish all antibiotics. DO NOT DISCONTINUE ON YOUR OWN.     Refer to medication reconcillation sheet attached with discharge instruction paperwork.

## 2019-09-18 ENCOUNTER — OUTPATIENT (OUTPATIENT)
Dept: OUTPATIENT SERVICES | Facility: HOSPITAL | Age: 59
LOS: 1 days | End: 2019-09-18
Payer: COMMERCIAL

## 2019-09-18 DIAGNOSIS — K63.5 POLYP OF COLON: Chronic | ICD-10-CM

## 2019-09-18 DIAGNOSIS — N46.9 MALE INFERTILITY, UNSPECIFIED: Chronic | ICD-10-CM

## 2019-09-18 DIAGNOSIS — Z90.49 ACQUIRED ABSENCE OF OTHER SPECIFIED PARTS OF DIGESTIVE TRACT: Chronic | ICD-10-CM

## 2019-09-18 PROCEDURE — 93010 ELECTROCARDIOGRAM REPORT: CPT

## 2019-09-23 ENCOUNTER — OUTPATIENT (OUTPATIENT)
Dept: OUTPATIENT SERVICES | Facility: HOSPITAL | Age: 59
LOS: 1 days | End: 2019-09-23
Payer: COMMERCIAL

## 2019-09-23 DIAGNOSIS — N46.9 MALE INFERTILITY, UNSPECIFIED: Chronic | ICD-10-CM

## 2019-09-23 DIAGNOSIS — K63.5 POLYP OF COLON: Chronic | ICD-10-CM

## 2019-09-23 DIAGNOSIS — Z90.49 ACQUIRED ABSENCE OF OTHER SPECIFIED PARTS OF DIGESTIVE TRACT: Chronic | ICD-10-CM

## 2019-09-23 PROCEDURE — 47000 NEEDLE BIOPSY OF LIVER PERQ: CPT

## 2019-09-23 PROCEDURE — 76942 ECHO GUIDE FOR BIOPSY: CPT | Mod: 26

## 2019-11-21 ENCOUNTER — APPOINTMENT (OUTPATIENT)
Dept: UROLOGY | Facility: CLINIC | Age: 59
End: 2019-11-21
Payer: MEDICAID

## 2019-11-21 VITALS
DIASTOLIC BLOOD PRESSURE: 84 MMHG | TEMPERATURE: 98 F | HEART RATE: 101 BPM | SYSTOLIC BLOOD PRESSURE: 142 MMHG | RESPIRATION RATE: 18 BRPM

## 2019-11-21 PROCEDURE — 99214 OFFICE O/P EST MOD 30 MIN: CPT

## 2019-11-21 RX ORDER — TAMSULOSIN HYDROCHLORIDE 0.4 MG/1
0.4 CAPSULE ORAL
Qty: 90 | Refills: 3 | Status: ACTIVE | COMMUNITY
Start: 2019-04-02 | End: 1900-01-01

## 2019-11-21 NOTE — LETTER BODY
[FreeTextEntry1] : Ignacia Nj MD\par [address]\[place] [address]\par [phone]\par \par Dear Dr. Nj,\par \par Reason for visit: BPH. \par \par This is a 59 year-old gentleman with colon cancer that has metastasized to the liver, on chemotherapy, and chronic BPH. He previously underwent colon surgery for polyp removal. Previous cystoscopy demonstrated a median lobe. He returns today for follow up. Since his last visit, he reports he has been taking Flomax once a day regularly without any side effects or difficulties. He reports of an improvement in his uroflow. However, recently, he reports the medication appears to be less effective. Patient denies any urinary retention, dysuria, or hematuria. Patient has no pain. He recently had a seizure. The past medical history and family history and social history are unchanged. All other review of systems are negative. Patient denies any changes in medications. Medication list was reconciled. He has no allergies to medication.\par \par On examination, the patient is a healthy-appearing gentleman in no acute distress. He is alert and oriented follows commands. He has normal mood and affect. He is normocephalic. Oral no thrush. Neck is supple. Respirations are unlabored. His abdomen is soft and nontender. Liver is nonpalpable. Bladder is nonpalpable. No CVA tenderness. Neurologically he is grossly intact. No peripheral edema. Skin without gross abnormality. He has normal male external genitalia. Normal meatus. Bilateral testes are descended intrascrotally and normal to palpation. On rectal examination, there is normal sphincter tone. The prostate is clinically benign without focal induration or nodularity.\par \par Assessment: BPH.\par \par I counseled the patient. I reassured him. I explained that Flomax once a day is the lowest dose and if his symptoms begin to return, I advise he consider taking Flomax twice a day as needed. Due to minimal bother, the patient declines Flomax BID. I renewed the patient's prescription for Flomax once a day today. I encouraged the patient to continue medication regularly as directed. Patient understands that if he develops gross hematuria or any urinary discomfort, he will contact me for further evaluation. Risks and alternatives were discussed. I answered the patient's questions. The patient will follow up as directed and will contact me with any questions or concerns. Thank you for the opportunity to participate in the care of Mr. PHILLIP. I will keep you updated on his progress. \par \par Plan: Continue Flomax. Follow up in 1 year.

## 2019-11-21 NOTE — ADDENDUM
[FreeTextEntry1] : Entered by Raad Castorena, acting as scribe for Dr. Jason Hartman.\par \par The documentation recorded by the scribe accurately reflects the service I personally performed and the decisions made by me.

## 2019-12-13 NOTE — PRE-OP CHECKLIST - SPO2 (%)
mALE ANNUAL EXAM note      History    Vaughn Cardenas is a 58 year old male who presents for an annual exam.  Patient reports that he has not been exercising like he had been.  Does try to get in somewhere between 5000 and 43299 steps a day, but typically this is at work.  States that he is not often elevating his heart rate at this time.  Also states that he is drinking diet Coke during the day at least one 20 oz bottle, sometimes 2. Did not drink any today because he knew he was coming here.    Patient also reports history of tension headaches.  States that he gets headaches sometimes behind his eyes, sometimes in the back of his head.  Does not get nausea or vomiting, can sometimes get some light sensitivity.  Uses Excedrin migraine, which is helpful.  At times he has also used Fioricet, which has worked as well.  Sometimes believes that they may be related to his sinuses as well.    Wife concerned about possible sleep apnea.  Patient reports that he does snore, does not always wake up feeling refreshed, says that this has been going on for years, though he gets.  Does not find himself waking a catching his breath.  He will urinate at least once during the night.      medical history    History reviewed. No pertinent past medical history.    SURGICAL history    Past Surgical History:   Procedure Laterality Date   • Hernia repair  1961       social history    Social History     Tobacco Use   • Smoking status: Former Smoker     Packs/day: 0.50     Years: 10.00     Pack years: 5.00     Types: Cigarettes   • Smokeless tobacco: Never Used   Substance Use Topics   • Alcohol use: Yes     Alcohol/week: 0.0 standard drinks     Comment: 2-3 drinks on Saturday and Sunday   • Drug use: No       family history    Family History   Problem Relation Age of Onset   • Cancer, Pancreatic Mother 71   • Diabetes Maternal Grandmother        mEDICATIONS    Current Outpatient Medications   Medication Sig   • meclizine HCl (ANTIVERT) 25  MG tablet Take 25 mg by mouth 3 times daily as needed.   • aspirin 81 MG tablet Take 81 mg by mouth daily.   • Doxylamine Succinate, Sleep, (SLEEP AID PO)      No current facility-administered medications for this visit.        aLLERGIES    ALLERGIES:  No Known Allergies    Review of systems      Constitutional:  Denies fevers, chills, weakness, fatigue, loss of appetite, abnormal weight gain or abnormal weight loss.    Eyes:  Denies blindness, blurred vision, double vision, pain, itching or burning.    HENT:  Denies facial pain, ear pain, hearing loss, tinnitus, nasal congestion, rhinorrhea, epistaxis, sinus pain, mouth lesions or sore throat.    Respiratory:  Denies shortness of breath, cough, sputum production, hemoptysis or wheezing.    Cardiovascular:  Denies chest pains, palpitations, tachycardia, edema, cyanosis or vertigo.    Gastrointestinal:  Denies abdominal pain, heartburn, nausea, vomiting, diarrhea, constipation or blood in stool.    Musculoskeletal:  Denies back pain, joint pain, joint swelling or tenderness, muscle pains or spasms.  Denies neck pain, stiffness or swelling.    Neurologic:  Denies numbness, tingling, other sensory changes, sudden weakness in arms or legs.  Denies confusion, headache, dizziness, memory loss or tremors.  See HPI.    Genitourinary:  Denies urinary frequency, nocturia, urgency, incontinence, dysuria or hematuria.    Hematologic/Lymph:  Denies easy bruising or bleeding, swollen lymph glands.    Endocrine:  Denies heat or cold intolerance, polydipsia or polyuria.  Denies changes in hair or skin texture.    Integument:  Denies new rashes or lesions, pruritus or dryness of skin.    Psychiatric:  Denies anxiety, depression, hallucinations, irritability or sleeping problems.  See HPI.    Allergic/Immunologic:  Denies recurrent infections, hypersensitivity.      All other Review of Systems negative.    HEALTH MAINTENANCE ISSUES: Updated and reviewed.    Physical ExamINATION     Vital Signs:    Vitals:    12/13/19 1604 12/13/19 1610 12/13/19 1637   BP: 144/82 (!) 140/106 (!) 150/102   Pulse: 80     Resp: 12     Temp: 97.5 °F (36.4 °C)     TempSrc: Temporal     Weight: 129.8 kg     Height: 6' 3.5\" (1.918 m)       General:  Well-developed, well-nourished. In no apparent distress.    Eyes:  PERRL, EOMI(Pupils equal, round, reactive to light, extraocular movements intact). Conjunctivae pink. Sclerae anicteric.    HENT:  Normocephalic, atraumatic. Bilateral external ears are normal. Mucosal membranes moist. External nose is normal. Oropharynx is clear.    Neck:  Supple. Nontender. Normal range of motion. Trachea midline.  Respiratory:  Normal respiratory effort. No chest wall tenderness. Lungs clear to auscultation bilaterally. Symmetrical chest expansion.    Cardiovascular:  Regular rate and rhythm. No murmurs, rubs or gallops. Normal S1 and S2. No S3 or S4. No JVD(jugular venous distension). No carotid bruits. Good dorsalis pedis pulses bilaterally. No peripheral edema.   Gastrointestinal:  Soft. Nontender. Nondistended. Normal bowel sounds. No pulsatile or other abdominal masses. No hepatosplenomegaly or splenomegaly.    Genitalia:  Normal external genitalia. No inguinal hernias noted.   Musculoskeletal:  No clubbing or cyanosis. Full range of motion in all 4 extremities proximal and distal. No joint swelling or tenderness in right and left shoulders, elbows, wrists and fingers. No joint swelling or tenderness in left and right knees, ankles and toes.  Neurologic:  Alert and oriented times 3. Gait is normal. Normal sensory function. No motor deficits in all 4 extremities. Cranial nerves II-XII intact.   Integumentary:  Warm. Dry. Pink. No rashes or lesions. No wounds.    Lymphatic:  No lymphadenopathy in submental, submandibular or cervical chain. No supraclavicular or infraclavicular lymphadenopathy. No axillary or inguinal/groin lymphadenopathy.    Psychiatric: Cooperative. Appropriate  mood and affect. Normal judgment.       Assessment/PLAN    1. Elevated hemoglobin A1c  Labs as noted below, will notify patient of results once obtained.  - GLYCOHEMOGLOBIN; Future    2. Screening for prostate cancer  Labs as noted below, will notify patient of results once obtained.  - PROSTATE SPECIFIC ANTIGEN, TOTAL WITH REFLEX TO FREE PSA; Future    3. Routine general medical examination at health care facility  General health maintenance discussed.  Recommend he work on diet and exercise. Labs as noted below, will notify patient of results once obtained.  Colonoscopy at age 50.  Patient reports normal exam, repeat in 10 years.  - BASIC METABOLIC PANEL; Future  - GLYCOHEMOGLOBIN; Future  - LIPID PANEL WITH REFLEX; Future  - CBC WITH DIFFERENTIAL; Future  - THYROID STIMULATING HORMONE REFLEX; Future  - PROSTATE SPECIFIC ANTIGEN, TOTAL WITH REFLEX TO FREE PSA; Future  - SGPT; Future  - SGOT; Future    4. Elevated blood pressure reading without diagnosis of hypertension  Blood pressure elevated today.  Patient often has white coat hypertension.  Will have him come back in a couple of weeks for blood pressure check.    5. Tension headache  Reports history of tension headaches.  Recommend using Excedrin migraine, if this becomes ineffective he will call let us know we will consider refilling his Fioricet for him.    6. Snoring  Discussed and recommended seeing a sleep specialist for possible sleep study.  Patient has declined.  Should he change his mind at any time he will call let us know we will place an order for him at that time.    Follow-up in 2 weeks with a nurse for blood pressure check; see me in 1 year for annual exam; sooner if needed.  Patient verbalizes an understanding.    Dorothy Kauffman NP     96

## 2020-01-15 ENCOUNTER — APPOINTMENT (OUTPATIENT)
Dept: INTERNAL MEDICINE | Facility: CLINIC | Age: 60
End: 2020-01-15

## 2020-02-12 ENCOUNTER — EMERGENCY (EMERGENCY)
Facility: HOSPITAL | Age: 60
LOS: 1 days | Discharge: HOME | End: 2020-02-12
Attending: EMERGENCY MEDICINE
Payer: MEDICAID

## 2020-02-12 ENCOUNTER — APPOINTMENT (OUTPATIENT)
Dept: SURGICAL ONCOLOGY | Facility: CLINIC | Age: 60
End: 2020-02-12

## 2020-02-12 VITALS
DIASTOLIC BLOOD PRESSURE: 88 MMHG | RESPIRATION RATE: 18 BRPM | HEART RATE: 115 BPM | SYSTOLIC BLOOD PRESSURE: 138 MMHG | OXYGEN SATURATION: 97 % | HEIGHT: 71 IN | TEMPERATURE: 98 F | WEIGHT: 179.9 LBS

## 2020-02-12 VITALS
RESPIRATION RATE: 16 BRPM | OXYGEN SATURATION: 98 % | SYSTOLIC BLOOD PRESSURE: 139 MMHG | TEMPERATURE: 98 F | HEART RATE: 74 BPM | DIASTOLIC BLOOD PRESSURE: 90 MMHG

## 2020-02-12 DIAGNOSIS — C18.9 MALIGNANT NEOPLASM OF COLON, UNSPECIFIED: Chronic | ICD-10-CM

## 2020-02-12 DIAGNOSIS — Z90.49 ACQUIRED ABSENCE OF OTHER SPECIFIED PARTS OF DIGESTIVE TRACT: Chronic | ICD-10-CM

## 2020-02-12 DIAGNOSIS — K63.5 POLYP OF COLON: Chronic | ICD-10-CM

## 2020-02-12 DIAGNOSIS — N46.9 MALE INFERTILITY, UNSPECIFIED: Chronic | ICD-10-CM

## 2020-02-12 LAB
ALBUMIN SERPL ELPH-MCNC: 4.3 G/DL — SIGNIFICANT CHANGE UP (ref 3.3–5.2)
ALP SERPL-CCNC: 52 U/L — SIGNIFICANT CHANGE UP (ref 40–120)
ALT FLD-CCNC: 18 U/L — SIGNIFICANT CHANGE UP
ANION GAP SERPL CALC-SCNC: 12 MMOL/L — SIGNIFICANT CHANGE UP (ref 5–17)
AST SERPL-CCNC: 36 U/L — SIGNIFICANT CHANGE UP
BILIRUB SERPL-MCNC: 0.5 MG/DL — SIGNIFICANT CHANGE UP (ref 0.4–2)
BUN SERPL-MCNC: 14 MG/DL — SIGNIFICANT CHANGE UP (ref 8–20)
CALCIUM SERPL-MCNC: 9.6 MG/DL — SIGNIFICANT CHANGE UP (ref 8.6–10.2)
CHLORIDE SERPL-SCNC: 97 MMOL/L — LOW (ref 98–107)
CO2 SERPL-SCNC: 23 MMOL/L — SIGNIFICANT CHANGE UP (ref 22–29)
CREAT SERPL-MCNC: 0.86 MG/DL — SIGNIFICANT CHANGE UP (ref 0.5–1.3)
GLUCOSE SERPL-MCNC: 130 MG/DL — HIGH (ref 70–99)
HCT VFR BLD CALC: 36.7 % — LOW (ref 39–50)
HGB BLD-MCNC: 12.5 G/DL — LOW (ref 13–17)
MCHC RBC-ENTMCNC: 33.9 PG — SIGNIFICANT CHANGE UP (ref 27–34)
MCHC RBC-ENTMCNC: 34.1 GM/DL — SIGNIFICANT CHANGE UP (ref 32–36)
MCV RBC AUTO: 99.5 FL — SIGNIFICANT CHANGE UP (ref 80–100)
PLATELET # BLD AUTO: 144 K/UL — LOW (ref 150–400)
POTASSIUM SERPL-MCNC: 4.4 MMOL/L — SIGNIFICANT CHANGE UP (ref 3.5–5.3)
POTASSIUM SERPL-SCNC: 4.4 MMOL/L — SIGNIFICANT CHANGE UP (ref 3.5–5.3)
PROT SERPL-MCNC: 6.7 G/DL — SIGNIFICANT CHANGE UP (ref 6.6–8.7)
RBC # BLD: 3.69 M/UL — LOW (ref 4.2–5.8)
RBC # FLD: 18.5 % — HIGH (ref 10.3–14.5)
SODIUM SERPL-SCNC: 132 MMOL/L — LOW (ref 135–145)
WBC # BLD: 8.91 K/UL — SIGNIFICANT CHANGE UP (ref 3.8–10.5)
WBC # FLD AUTO: 8.91 K/UL — SIGNIFICANT CHANGE UP (ref 3.8–10.5)

## 2020-02-12 PROCEDURE — 99220: CPT

## 2020-02-12 PROCEDURE — 70450 CT HEAD/BRAIN W/O DYE: CPT | Mod: 26

## 2020-02-12 PROCEDURE — 85027 COMPLETE CBC AUTOMATED: CPT

## 2020-02-12 PROCEDURE — 99284 EMERGENCY DEPT VISIT MOD MDM: CPT | Mod: 25

## 2020-02-12 PROCEDURE — 80053 COMPREHEN METABOLIC PANEL: CPT

## 2020-02-12 PROCEDURE — 99283 EMERGENCY DEPT VISIT LOW MDM: CPT

## 2020-02-12 PROCEDURE — 36415 COLL VENOUS BLD VENIPUNCTURE: CPT

## 2020-02-12 PROCEDURE — G0378: CPT

## 2020-02-12 PROCEDURE — 70553 MRI BRAIN STEM W/O & W/DYE: CPT

## 2020-02-12 PROCEDURE — 70553 MRI BRAIN STEM W/O & W/DYE: CPT | Mod: 26

## 2020-02-12 PROCEDURE — 70450 CT HEAD/BRAIN W/O DYE: CPT

## 2020-02-12 PROCEDURE — 96374 THER/PROPH/DIAG INJ IV PUSH: CPT | Mod: XU

## 2020-02-12 PROCEDURE — 82962 GLUCOSE BLOOD TEST: CPT

## 2020-02-12 RX ORDER — LEVETIRACETAM 250 MG/1
1000 TABLET, FILM COATED ORAL ONCE
Refills: 0 | Status: COMPLETED | OUTPATIENT
Start: 2020-02-12 | End: 2020-02-12

## 2020-02-12 RX ORDER — LEVETIRACETAM 250 MG/1
500 TABLET, FILM COATED ORAL
Refills: 0 | Status: DISCONTINUED | OUTPATIENT
Start: 2020-02-12 | End: 2020-02-17

## 2020-02-12 RX ORDER — SODIUM CHLORIDE 9 MG/ML
1000 INJECTION INTRAMUSCULAR; INTRAVENOUS; SUBCUTANEOUS ONCE
Refills: 0 | Status: COMPLETED | OUTPATIENT
Start: 2020-02-12 | End: 2020-02-12

## 2020-02-12 RX ORDER — LEVETIRACETAM 250 MG/1
1 TABLET, FILM COATED ORAL
Qty: 28 | Refills: 0
Start: 2020-02-12 | End: 2020-02-25

## 2020-02-12 RX ADMIN — LEVETIRACETAM 400 MILLIGRAM(S): 250 TABLET, FILM COATED ORAL at 15:23

## 2020-02-12 RX ADMIN — SODIUM CHLORIDE 1000 MILLILITER(S): 9 INJECTION INTRAMUSCULAR; INTRAVENOUS; SUBCUTANEOUS at 15:23

## 2020-02-12 RX ADMIN — LEVETIRACETAM 500 MILLIGRAM(S): 250 TABLET, FILM COATED ORAL at 20:23

## 2020-02-12 NOTE — ED PROVIDER NOTE - PHYSICAL EXAMINATION
Gen: Well appearing in NAD  Head: NC/AT  Neck: trachea midline  Resp:  No distress, CTAB  CV: RRR  GI: soft, NTND  Ext: no deformities  Neuro:  CN 2-12 intact, No tremors. No fasciculations. No nystagmus. Normal finger to nose and heel to shin b/l. No dysmetria.  Normal gait.   Skin:  Warm and dry as visualized  Psych:  Normal affect and mood

## 2020-02-12 NOTE — ED ADULT NURSE NOTE - OBJECTIVE STATEMENT
Patient states he was at his doctors office when he experienced a seizure, hx of seizures not taking medications.  Patient states he is currently on chemotherapy for colon CA with mets to liver.  Denies hitting head, trauma, or pain at this time. PAtient ambulatory with steady gait.

## 2020-02-12 NOTE — ED PROVIDER NOTE - PROGRESS NOTE DETAILS
Yong ALEXANDRA: discussed with Dr Espinoza agrees with plan for outpt follow up and starting pt on keppra, assuming the workup is negative. Yong ALEXANDRA: possible brain metastasis on CTH, Pt is asymptomatic and would like to go home. Has Oncology appt tomorrow. Paged patient's oncologist to discuss. Yong ALEXANDRA: discussed with Dr Villegas (oncology), would like the MRI tonight. Patient has agreed to stay for MRI, will place in CDU. Has oncology appt at 11AM tomorrow.

## 2020-02-12 NOTE — ED CDU PROVIDER INITIAL DAY NOTE - PSH
Colon cancer  liver and brain metastasis  Colon polyp  colonoscopy -- negative biopsies in 2017  Infertility, male, severe  vascular procedure for infertility  S/P partial colectomy  Robotic Assisted Right Colectomy

## 2020-02-12 NOTE — ED CDU PROVIDER INITIAL DAY NOTE - CROS ED NEURO NEG
no headache/no difficulty walking/imbalance/no change in level of consciousness/no seizure activity in ED

## 2020-02-12 NOTE — ASSESSMENT
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 Jules with excellent response to liver disease on PET/CT\par Will likely need open resection???\par Options, risks and benefits of surgery discussed with patient.

## 2020-02-12 NOTE — CONSULT LETTER
[Dear  ___] : Dear  [unfilled], [Consult Letter:] : I had the pleasure of evaluating your patient, [unfilled]. [Consult Closing:] : Thank you very much for allowing me to participate in the care of this patient.  If you have any questions, please do not hesitate to contact me. [Sincerely,] : Sincerely, [DrScott  ___] : Dr. HILL [DrScott ___] : Dr. HILL [FreeTextEntry2] : Rd Garcia MD [FreeTextEntry3] : Dean Cooley MD, FACS, FASCRS\par , Department of Surgery\par Director of the [facility] [place]\par , Minimally Invasive/Robotic Cancer Surgery, Central & Eastern Divisions\par Division of Surgical Oncology

## 2020-02-12 NOTE — PHYSICAL EXAM
[Normal] : supple, no neck mass and thyroid not enlarged [Normal Neck Lymph Nodes] : normal neck lymph nodes  [Normal Supraclavicular Lymph Nodes] : normal supraclavicular lymph nodes [Normal] : oriented to person, place and time, with appropriate affect [de-identified] : Trochar sites well-healed, no mass or hernia.

## 2020-02-12 NOTE — ED ADULT NURSE NOTE - CHIEF COMPLAINT QUOTE
pt arrive by ambulance from [facility] waiting room after witnessed tonic clonic seizure activity lasting atleast 30-60 seconds. pt was at first postictal with improvement. arrives with 20g LFA. pt with hx alcohol abuse, last drink monday. hx colon cancer mets to liver. as per paper work pt with seizure in past, self discontinued keppra.

## 2020-02-12 NOTE — ED ADULT NURSE REASSESSMENT NOTE - NSIMPLEMENTINTERV_GEN_ALL_ED
Implemented All Universal Safety Interventions:  [place] to call system. Call bell, personal items and telephone within reach. Instruct patient to call for assistance. Room bathroom lighting operational. Non-slip footwear when patient is off stretcher. Physically safe environment: no spills, clutter or unnecessary equipment. Stretcher in lowest position, wheels locked, appropriate side rails in place.

## 2020-02-12 NOTE — CONSULT NOTE ADULT - ASSESSMENT
IMP:    SEIZURE     MR BRAIN WITH Stable extraconal lesion medial right orbit, adjacent to the medial   rectus 6 x 10 x 12 mm.    NO BRAIN METS            PLAN:   NEED NEUROLOGY  OPTHALMOLOGY   WILL NEED F/U MR BRAIN W & W/O CONT     NO NEUROSURGICAL INTERVENTION IMP:    SEIZURE   SEIZURE HX  ETOH ABUSE    COLON CA  LIVER METS     MR BRAIN WITH Stable extraconal lesion medial right orbit, adjacent to the medial   rectus 6 x 10 x 12 mm.    NO BRAIN METS            PLAN: DISCUSSED WITH DR SOUTH  NEED NEUROLOGY CONSULT  OPTHALMOLOGY CONSULT  F/U ONCOLOGIST    WILL NEED F/U MR BRAIN W & W/O CONT AS PER ONC/NEUROLOGY/OPTHO     NO NEUROSURGICAL INTERVENTION

## 2020-02-12 NOTE — HISTORY OF PRESENT ILLNESS
[de-identified] : 59 year-old male returns for follow up.  Initial baseline screening colonoscopy was performed by Dr. Rd Garcia in September 2016, which showed numerous sizeable polyps in the right colon.  Pathology of all polyps was consistent with fragments of tubular adenoma.  He subsequently underwent a colonoscopy with Dr. Markham for polyp removal in October 2016.  At that time, he was also found to have multiple polyps involving the descending colon, which were not excised given already large number of polyps removed from the ascending colon.  Pathology of one polyp found in the cecum demonstrated tubulovillous adenoma with foci of high grade dysplasia.  He also had a CT in September 2016 which showed no evidence of malignancy but some suggestion of possible gallstones and equivocal findings in the bladder for which pelvic sono was recommended but not performed.\par \par He is s/p robotic extended right colectomy 4/7/17.  Final pathology demonstrated 3 tubular adenomas, no high grade dysplasia or invasive carcinoma, cecal submucosal lipoma with overlying hyperplastic polyps, and 20 negative lymph nodes.\par \par Most recent colonoscopy was performed by Dr. Carmelo Marr on 5/24/19 to evaluate new onset of anemia- Dr. Garcia was not available to do the exam at that time.  Exam revealed 2 hard areas of nodular mucosa at the ileocolonic anastomosis which were biopsied and consistent with adenocarcinoma with LVI present.  A partially obstructing mass was seen in the descending colon at approximately 50 cm which measured 2-3 cm in length.  Pediatric scope was required to traverse the mass.  Pathology was also adenocarcinoma.  The colon proximal to the mass had copious stool which limited evaluation.  There was a 1 cm polyp in the transverse colon but polypectomy was not attempted given findings of the mass in the descending colon. \par \par A CT of the chest, abdomen and pelvis on 6/5/19 revealed lesions in the right hepatic lobe measuring 1.1 cm and 1 cm, and an additional suspected 1.6 cm lesion concerning for metastatic disease.  There is wall thickening involving the left colon and prominent lymph nodes surrounding the bowel anastomosis.  There is non specific stranding along the course of the distal left ureter and urinalysis was recommended to exclude underlying infection. \par \par PET/CT was performed on 8/1/19 and demonstrated multiple FDG avid hepatic lesions corresponding to lesions seen on CT.   There is FDG avid soft tissue at the ileocolic anastomosis and FDG avid soft tissue thickening in the mid descending colon,  consistent with recurrent adenocarcinoma.  There are subcentimeter lymph nodes adjacent to the ileocolic anastomosis, probable metastatic but not discretely FDG avid and too small to characterize. \par \par I placed a Mediport on 8/30/19.\par \par He was subjected to a a percutaneous liver biopsy on 9/23/19 which confirmed metastatic adenocarcinoma.\par \par He began FOLFOX + Avastin under the care of Dr. Chris Jules.\par \par He had an MRI in November 2019 which demonstrated a 10.8 mm enhancing lesion in the right orbit.  He underwent retinal evaluation and was told that no further work up was required.\par \par Post treatment PET/CT performed on 12/13/19 demonstrated FDG uptake at the anastomosis in the right upper quadrant and additional focus in a loop of small bowel inferior to the former focus.  Additional focal areas of uptake in the bowel.  There are small retroperitoneal lymph nodes, some of which demonstrate mild uptake.  Previously seen hepatic foci have resolved and currently there is heterogeneous uptake in the liver but no discrete intense focal areas of uptake.  A right orbital lesion cannot be adequately assessed due to physiologic uptake in the orbital muscles (patient previously evaluated by opthalmology).  There is nonspecific asymmetric uptake in the left palatine tonsil, and FDG avid bilateral cervical lymph nodes which are nonspecific in light of former findings in the head and neck region. \par \par Dr. Jules has asked him to see me today to determine if there is any role for surgery at this point given his excellent response in the liver. \par \par He is feeling well overall.  He has not noticed any blood in his stool recently.  He denies bowel habit changes, thin caliber stools, abdominal pain, nausea or vomiting. \par \par His PMH is otherwise unremarkable.  He has no prior surgeries and no family history of malignancies.  \par \par He is in recovery from alcohol addition and recently completed 3 weeks of in patient therapy. \par \par PCP: Dr. Daina Nj\par Referring MD/GI: Dr. Rd Garcia\par GI: Dr. Rock Markham, Dr. Carmelo Marr\par Med Onc: Dr. Chris Jules

## 2020-02-12 NOTE — CONSULT NOTE ADULT - CONSULT REASON
Stable extraconal lesion medial right orbit, adjacent to the medial   rectus 6   x 10 x 12 mm. Stable extraconal lesion medial right orbit, adjacent to the medial   rectus 6 x 10 x 12 mm.

## 2020-02-12 NOTE — ED ADULT NURSE NOTE - NSIMPLEMENTINTERV_GEN_ALL_ED
Implemented All Universal Safety Interventions:  [place] to call system. Call bell, personal items and telephone within reach. Instruct patient to call for assistance. Room bathroom lighting operational. Non-slip footwear when patient is off stretcher. Physically safe environment: no spills, clutter or unnecessary equipment. Stretcher in lowest position, wheels locked, appropriate side rails in place.

## 2020-02-12 NOTE — ED ADULT TRIAGE NOTE - CHIEF COMPLAINT QUOTE
pt arrive by ambulance from [facility] waiting room after witnessed tonic clonic seizure activity lasting atleast 30-60 seconds. pt was at first postictal with improvement. arrives with 20g LFA. pt with hx alcohol abuse, last drink monday. hx colon cancer mets to liver. as per paper work pt with seizure in past, self discontinued keppra.

## 2020-02-12 NOTE — ED CDU PROVIDER DISPOSITION NOTE - CLINICAL COURSE
60 y/o M diagnosed with colon cancer with liver metastasis 6/2019, hx alcohol abuse, presents for witnessed seizure while at Surgeon's office today. CT showed 2 hypodense regions, Keppra IV given. Pt placed in OBS for MRI which showed "Stable extraconal lesion medial right orbit, adjacent to the medial rectus 6 x 10 x 12 mm." Spoke to Oncologist group on call Dr. Villegas, recommending neurosurgical assessment. Per Neurosurgery team no acute intervention. Spoke again to Dr. Villegas, pt has oncology appt tomorrow, advised to keep. Given information for Neurology and Opthalmology for follow up. Advised to take Rx Keppra, seizure precautions.

## 2020-02-12 NOTE — ED CDU PROVIDER DISPOSITION NOTE - PATIENT PORTAL LINK FT
You can access the FollowMyHealth Patient Portal offered by [facility] by registering at the following website: [email or web address]. By joining [facility]’s FollowMyHealth portal, you will also be able to view your health information using other applications (apps) compatible with our system.

## 2020-02-12 NOTE — CONSULT NOTE ADULT - SUBJECTIVE AND OBJECTIVE BOX
CC: Patient is a 59y old  Male who presents with a chief complaint of seizure.   SOURCE: Patient   HPI: Patient is a 59y old  Male who presents with a chief complaint of seizure.     pt c/o + -headache  /10 on the pain scale   + - Nausea /+ - Vomiting  admits denies weakness  admits denies numbness/ tingling  admist denies visual changes  admist denies C/T/LS  Spine pain    PAST MEDICAL:   Colon cancer: liver and brain metsastasis  Colon polyps  Hypertension: &quot;labile&quot; -- not on medication as of 30-22-17  Snoring: ANSON precautions -- responds affirmatively to STOP BANG questionnaire -- admits to loud snoring; age &gt; 50; gender, male; elevated BP at PAST office  Colon polyp: multiple  Colon cancer: liver and brain metastasis    PAST SURGICAL HX:   partial colectomy: Robotic Assisted Right Colectomy 6/19   Infertility, male, severe: vascular procedure for infertility  Colon polyp: colonoscopy -- negative biopsies in 2017      SOCIAL HISTORY: +  EtOH, + - tobacco, + - drugs    FAMILY HISTORY:  Family history of lung cancer (Mother)      ROS:  CONSTITUTIONAL: No fever, weight loss, or fatigue  EYES: No eye pain, visual disturbances, or discharge  ENMT:  No difficulty hearing, tinnitus, vertigo; No sinus or throat pain  NECK: No pain or stiffness  RESPIRATORY: No cough, wheezing, chills or hemoptysis; No shortness of breath  CARDIOVASCULAR: No chest pain, palpitations, dizziness, or leg swelling  GASTROINTESTINAL: No abdominal or epigastric pain. No nausea, vomiting, or hematemesis; No diarrhea or constipation. No melena or hematochezia.  GENITOURINARY: No dysuria, frequency, hematuria, or incontinence  NEUROLOGICAL: No headaches, memory loss, loss of strength, numbness, or tremors  SKIN: No itching, burning, rashes, or lesions   LYMPH NODES: No enlarged glands  ENDOCRINE: No heat or cold intolerance; No hair loss  MUSCULOSKELETAL: No joint pain or swelling; No muscle, back, or extremity pain  PSYCHIATRIC: No depression, anxiety, mood swings, or difficulty sleeping  HEME/LYMPH: No easy bruising, or bleeding gums  ALLERGY AND IMMUNOLOGIC: No hives or eczema      MEDICATIONS  (STANDING):  levETIRAcetam 500 milliGRAM(s) Oral two times a day    MEDICATIONS  (PRN):  chlordiazePOXIDE 25 milliGRAM(s) Oral every 6 hours PRN Anxiety and/or Alcohol Withdrawal Symptoms      Allergies: No Known Allergies            Vital Signs Last 24 Hrs  T(C): 36.4 (12 Feb 2020 17:57), Max: 36.5 (12 Feb 2020 14:02)  T(F): 97.5 (12 Feb 2020 17:57), Max: 97.7 (12 Feb 2020 14:02)  HR: 70 (12 Feb 2020 17:57) (70 - 115)  BP: 156/95 (12 Feb 2020 17:57) (138/88 - 156/95)  BP(mean): --  RR: 18 (12 Feb 2020 17:57) (18 - 18)  SpO2: 100% (12 Feb 2020 17:57) (97% - 100%)    PHYSICAL EXAM:  GENERAL: NAD, well-groomed, well-developed  HEAD:  Atraumatic, Normocephalic  EYES: EOMI, PERRLA, conjunctiva and sclera clear  ENMT: No tonsillar erythema, exudates, or enlargement; Moist mucous membranes, Good dentition, No lesions  NECK: Supple, No JVD  NERVOUS SYSTEM:  Alert & Oriented X3, Good concentration; Motor Strength 5/5 B/L upper and lower extremities; DTRs 2+ intact and symmetric  CHEST/LUNG: Clear bilaterally; No rales, rhonchi, wheezing, or rubs  HEART: Regular rate and rhythm; No murmurs, rubs, or gallops  ABDOMEN: Soft, Nontender, Nondistended; Bowel sounds present  EXTREMITIES:  2+ Peripheral Pulses, No clubbing, cyanosis, or edema  LYMPH: No lymphadenopathy noted  SKIN: No rashes or lesions      RADIOLOGY & ADDITIONAL STUDIES:  CT HEAD:  Focal low attenuation nonhemorrhagic lesion measures 9 mm on image 30 series   2 with a similar RIGHT periatrial rounded hyperdense nonhemorrhagic lesion   measuring 7.9 mm image 25 series 2 in light of patient's known history of   metastatic colon carcinoma brain metastasis should be considered. Follow-up   brain MRI with contrast recommended.   The ventricles and sulci are otherwise normal for the patient's age.   The posterior fossa structures and basal cisterns appear normal.   There is no intracranial hemorrhage.   There is no midline shift is no sulcal effacement to suggest acute stroke.   The basal ganglia and thalami appear normal in morphology and attenuation.   The visualized portions of the optic globes and paranasal sinuses are normal.   There are no skull fractures.   IMPRESSION:   Bilateral areas of focal low density, nonhemorrhagic concerning for   metastatic brain disease,. Follow-up contrast MRI recommended. No   intracranial hemorrhage..     MR BRAIN W & W/O CONT:   Brain: Mild small vessel ischemic changes in the periventricular white   matter.   No evidence of an acute cortical infarct.   Ventricles: Normal. No ventriculomegaly.   Bones/joints: Unremarkable.   Soft tissues: Unremarkable.   Sinuses: Mild paranasal sinus disease.   Mastoid air cells: Normal as visualized. No mastoid effusion.   Orbits: Stable extraconal lesion medial right orbit, adjacent to the medial   rectus 6 x 10 x 12 mm.   IMPRESSION:   1. No acute intracranial abnormality.   2. Stable extraconal lesion medial right orbit, adjacent to the medial   rectus 6   x 10 x 12 mm.                             12.5   8.91  )-----------( 144      ( 12 Feb 2020 15:37 )             36.7     02-12    132<L>  |  97<L>  |  14.0  ----------------------------<  130<H>  4.4   |  23.0  |  0.86    Ca    9.6      12 Feb 2020 15:37    TPro  6.7  /  Alb  4.3  /  TBili  0.5  /  DBili  x   /  AST  36  /  ALT  18  /  AlkPhos  52  02-12 CC: Patient is a 59y old  Male who presents with a chief complaint of seizure.   SOURCE: Patient competent source   HPI: Patient is a 59y old  Male who presents with a chief complaint of seizure. States no recall of seizure. No complaints now, denies HA, no change motor, sensory, vision, hearing, speech, no N/V, no incontinence, Denies any injury as result of seizure. No recent illness. Patient admits to ETOH abuse, has had 2 prior seizers 1917 and 12/19, he thinks ETOH related. Patient states he is in cycle of chemo treatments and had tx yesterday. Denies recent trauma. No hx of head injury. Patient states that he is in process of planing surgery for new found colon Ca.         PAST MEDICAL:   Colon cancer: liver and brain metastasis 6/19, chemo treatments ( most recent tx yesterday)   Hypertension:  not on medication as of 30-22-17  Snoring: ANSON precautions -- responds affirmatively to STOP BANG questionnaire -- admits to loud snoring; age &gt; 50; gender, male; elevated BP at PAST office  Colon polyp: multiple      PAST SURGICAL HX:   partial colectomy: Robotic Assisted Right Colectomy 6/19   Infertility, male, severe: vascular procedure for infertility  Colon polyp: colonoscopy -- negative biopsies in 2017      SOCIAL HISTORY: +  EtOH OVER 10 YRS, 1/2PT-1PT/DAY VODKA CURRENTLY , IN RECENT PAST 1 LITER PER DAY,  - tobacco, +  drugs marijuana, none recent      FAMILY HISTORY:  Family history of lung cancer (Mother)      ROS:  CONSTITUTIONAL: No fever, weight loss, or fatigue  EYES: No eye pain, visual disturbances, or discharge  ENMT:  No difficulty hearing, tinnitus, vertigo; No sinus or throat pain  NECK: No pain or stiffness  RESPIRATORY: No cough, wheezing, chills or hemoptysis; No shortness of breath  CARDIOVASCULAR: No chest pain, palpitations, dizziness, or leg swelling  GASTROINTESTINAL: No abdominal or epigastric pain. No nausea, vomiting, or hematemesis; No diarrhea or constipation. No melena or hematochezia.  GENITOURINARY: No dysuria, frequency, hematuria, or incontinence  NEUROLOGICAL: No headaches, memory loss, loss of strength, numbness, or tremors  SKIN: No itching, burning, rashes, or lesions   LYMPH NODES: No enlarged glands  ENDOCRINE: No heat or cold intolerance; No hair loss  MUSCULOSKELETAL: No joint pain or swelling; No muscle, back, or extremity pain  PSYCHIATRIC: No depression, anxiety, mood swings, or difficulty sleeping, admits to memory difficulty since chemo  treatments   HEME/LYMPH: No easy bruising, or bleeding gums  ALLERGY AND IMMUNOLOGIC: No hives or eczema      MEDICATIONS: Denies   levETIRAcetam 500 milliGRAM(s) Oral two times a day    MEDICATIONS  (PRN):  chlordiazePOXIDE 25 milliGRAM(s) Oral every 6 hours PRN Anxiety and/or Alcohol Withdrawal Symptoms      Allergies: No Known Allergies      Vital Signs Last 24 Hrs  T(C): 36.4 (12 Feb 2020 17:57), Max: 36.5 (12 Feb 2020 14:02)  T(F): 97.5 (12 Feb 2020 17:57), Max: 97.7 (12 Feb 2020 14:02)  HR: 70 (12 Feb 2020 17:57) (70 - 115)  BP: 156/95 (12 Feb 2020 17:57) (138/88 - 156/95)  BP(mean): --  RR: 18 (12 Feb 2020 17:57) (18 - 18)  SpO2: 100% (12 Feb 2020 17:57) (97% - 100%)    PHYSICAL EXAM:  GENERAL: NAD, well-groomed, well-developed  HEAD:  Atraumatic, Normocephalic  EYES: EOMI, PERRLA, conjunctiva, right sclera with lateral darkened patches  ENMT: right lateral tongue w 0.5cm patch  exudate, dry mucous membranes, poor dentition  NECK: Supple, No JVD  NERVOUS SYSTEM:  Alert & Oriented X3, Good concentration;   per, eomi,  cranial nerves 2-12 intact  gross visual acuity and fields intact bilaterally   Motor Strength 5/5 B/L upper and lower extremities;   sensory intact all,  fine motor and coordination intact all  no pronator drift  CHEST/LUNG: Clear bilaterally; No rales, rhonchi, wheezing, or rubs  HEART: Regular rate and rhythm; No murmurs, rubs, or gallops  ABDOMEN: Soft, Nontender, Nondistended; Bowel sounds present  EXTREMITIES:  2+ Peripheral Pulses radial and DP bilaterally, No clubbing, cyanosis, or edema  LYMPH: No lymphadenopathy noted  SKIN: No rashes or lesions      RADIOLOGY & ADDITIONAL STUDIES:  CT HEAD:  Focal low attenuation nonhemorrhagic lesion measures 9 mm on image 30 series   2 with a similar RIGHT periatrial rounded hyperdense nonhemorrhagic lesion   measuring 7.9 mm image 25 series 2 in light of patient's known history of   metastatic colon carcinoma brain metastasis should be considered. Follow-up   brain MRI with contrast recommended.   The ventricles and sulci are otherwise normal for the patient's age.   The posterior fossa structures and basal cisterns appear normal.   There is no intracranial hemorrhage.   There is no midline shift is no sulcal effacement to suggest acute stroke.   The basal ganglia and thalami appear normal in morphology and attenuation.   The visualized portions of the optic globes and paranasal sinuses are normal.   There are no skull fractures.   IMPRESSION:   Bilateral areas of focal low density, nonhemorrhagic concerning for   metastatic brain disease,. Follow-up contrast MRI recommended. No   intracranial hemorrhage..     MR BRAIN W & W/O CONT:   Brain: Mild small vessel ischemic changes in the periventricular white   matter.   No evidence of an acute cortical infarct.   Ventricles: Normal. No ventriculomegaly.   Bones/joints: Unremarkable.   Soft tissues: Unremarkable.   Sinuses: Mild paranasal sinus disease.   Mastoid air cells: Normal as visualized. No mastoid effusion.   Orbits: Stable extraconal lesion medial right orbit, adjacent to the medial   rectus 6 x 10 x 12 mm.   IMPRESSION:   1. No acute intracranial abnormality.   2. Stable extraconal lesion medial right orbit, adjacent to the medial   rectus 6   x 10 x 12 mm.                             12.5   8.91  )-----------( 144      ( 12 Feb 2020 15:37 )             36.7     02-12    132<L>  |  97<L>  |  14.0  ----------------------------<  130<H>  4.4   |  23.0  |  0.86    Ca    9.6      12 Feb 2020 15:37    TPro  6.7  /  Alb  4.3  /  TBili  0.5  /  DBili  x   /  AST  36  /  ALT  18  /  AlkPhos  52  02-12

## 2020-02-12 NOTE — ED ADULT NURSE REASSESSMENT NOTE - NS ED NURSE REASSESS COMMENT FT1
assumed care of pt @ 1930, report received from Ck POE,. charting as noted. pt currently @ MRI. awaiting pt return to CDU for RN assessment.
pt d/c in stable condition, no apparent distress noted at this time. pt A&Ox3. pt able to ambulate with steady gait. pt in no distress at d/c.
pt returned to CDU from MRI. pt AOx4 in NAD, Vital Signs Stable. pt denies any complaints at this time. awaiting MRI results.
Received report from DEEPIKA Tam. Pt moved to CDU for observation.

## 2020-02-12 NOTE — ED ADULT NURSE REASSESSMENT NOTE - BREATH SOUNDS, LEFT
Behavioral Health Interdisciplinary Rounds     Patient Name: Rehana Ayon  Age: 24 y.o.   Room/Bed:  72/  Primary Diagnosis: Major depressive disorder   Admission Status: Voluntary     Readmission within 30 days: no  Power of  in place: no  Patient requires a blocked bed: no          Reason for blocked bed:     VTE Prophylaxis: no    Mobility needs/Fall risk: no    Nutritional Plan: no  Consults:          Labs/Testing due today?: no    Sleep hours:  7.15      Participation in Care/Groups:  yes  Medication Compliant?: Yes  PRNS (last 24 hours): Sleep Aid    Restraints (last 24 hours):  no     CIWA (range last 24 hours): CIWA-Ar Total: 0   COWS (range last 24 hours): COWS Total: 0    Alcohol screening (AUDIT) completed -   AUDIT Score: 3     If applicable, date SBIRT discussed in treatment team AND documented:     Tobacco - patient is a smoker: Have You Used Tobacco in the Past 30 Days: No  Illegal Drugs use: Have You Used Any Illegal Substances Over the Past 12 Months: No    24 hour chart check complete: yes     Patient goal(s) for today:   Treatment team focus/goals:   Progress note     LOS:  4  Expected LOS:     Financial concerns/prescription coverage:  no  Date of last family contact:       Family requesting physician contact today:  no  Discharge plan: yes  Guns in the home:         Outpatient provider(s):     Participating treatment team members: Rehana Ayon, * (assigned SW),
clear
clear

## 2020-02-12 NOTE — ED CDU PROVIDER INITIAL DAY NOTE - ATTENDING CONTRIBUTION TO CARE
I, Lc Velazquez, have personally performed a face to face diagnostic evaluation on this patient. I have reviewed the ACP note and agree with the history, exam and plan of care, except as noted.    58 yo M hx of colon ca on chemo  and alcohol abuse had witnessed seizure at his oncologist office. last drink yesterday. No focal neurological deficit on exam. No signes with alcohol withdrawl.  CT showed 2 hypodense region. Kappra 1 g IV given. patient placed in the Obs for MRI, which showed " Stable extraconal lesion medial right orbit, adjacent to the medial rectus 6 x 10 x 12 mm.". Neurosurgery consulted and report no acute surgical intervention. Spoke with patient's oncologist group. Agreeable with the plan to discharge. He has an appoint tomorrow morning at 11am. Patient is discharged home on keppra with oncology, opthomology, and neurology follow up.

## 2020-02-12 NOTE — ED PROVIDER NOTE - PSH
Colon polyp  colonoscopy -- negative biopsies in 2017  Infertility, male, severe  vascular procedure for infertility  S/P partial colectomy  Robotic Assisted Right Colectomy

## 2020-02-12 NOTE — ED CDU PROVIDER DISPOSITION NOTE - NSFOLLOWUPINSTRUCTIONS_ED_ALL_ED_FT
- Go to your oncologist appt tomorrow 2/12/2020, bring all copies of test results  - Please follow up with your Primary Care Doctor in 24-48 hr.  - Seek immediate medical care for any new, worsening or concerning signs or symptoms.   - Take medications as directed, be sure to read all instructions on packaging  - You were given copies of all your test results, please bring to your primary care doctor and oncologist for review  - Follow up with Neurologist and Ophthalmologist (eye doctor) in 1-2 days     Feel better!   ~~~~~~~~~~~~~~  Seizure    A seizure is abnormal electrical activity in the brain; the specific cause may or may not be found. Prior to a seizure you may experience a warning sensation (aura) that may include fear, nausea, dizziness, and visual changes such as flashing lights of spots. Common symptoms during the seizure may include an altered mental status, rhythmic jerking movements, drooling, grunting, loss of bladder or bowel control, or tongue biting. After a seizure, you may feel confused and sleepy.     ***Do not swim, drive, operate machinery, or engage in any risky activity during which a seizure could cause further injury to you or others. Teach friends and family what to do if you HAVE a seizure which includes laying you on the ground with your head on a cushion and turning you to the side to keep your breathing passages clear in case of vomiting.****    SEEK IMMEDIATE MEDICAL CARE IF YOU HAVE ANY OF THE FOLLOWING SYMPTOMS: seizure lasting over 5 minutes, not waking up or persistent altered mental status after the seizure, or more frequent or worsening seizures.   SEEK IMMEDIATE MEDICAL CARE IF YOU HAVE ANY OF THE FOLLOWING SYMPTOMS: seizures, vomiting blood, blood in your stool, lightheadedness/dizziness, or becoming shaky to tremulous when you stop drinking.

## 2020-02-12 NOTE — ED PROVIDER NOTE - PMH
Alcohol abuse  relapse and last drink was 3-14-17. Had been sober for 168 days prior to drink on 3-14-17  Alcohol abuse    Colon polyp  multiple  Colon polyps    Hypertension  "labile" -- not on medication as of 30-22-17  Snoring    Snoring  ANSON precautions -- responds affirmatively to STOP BANG questionnaire -- admits to loud snoring; age > 50; gender, male; elevated BP at PAST office

## 2020-02-12 NOTE — ED CDU PROVIDER INITIAL DAY NOTE - PMH
Colon cancer  liver and brain metsastasis  Colon polyp  multiple  Colon polyps    Hypertension  "labile" -- not on medication as of 30-22-17  Snoring  ANSON precautions -- responds affirmatively to STOP BANG questionnaire -- admits to loud snoring; age > 50; gender, male; elevated BP at PAST office

## 2020-02-12 NOTE — ED PROVIDER NOTE - OBJECTIVE STATEMENT
59M h/o colon cancer with mets to liver, seizures not on any meds presents s/p seizure. Was at his dr's office sitting in the waiting room, as per witness report had GTC seizure 59M h/o colon cancer with mets to liver, seizures not on any meds presents s/p seizure. Was at his dr's office sitting in the waiting room, as per witness report had GTC seizure. Post ictal with EMS, now back to baseline. Has never seen a neurologist as per pt, 2 prior seizures. One seizure thought to be 2/2 alcohol withdrawal. Denies fever, head trauma, vomiting, incontinence.

## 2020-02-12 NOTE — ED CDU PROVIDER INITIAL DAY NOTE - MEDICAL DECISION MAKING DETAILS
Will obtain MRI with and without marie to evaluate lesions, continue keppra.  Refusing Librium for ETOH, stating "I don't need it"  Will continue to monitor.

## 2020-02-12 NOTE — ED CDU PROVIDER DISPOSITION NOTE - CARE PROVIDER_API CALL
Noble sEpinoza; PhD)  Neurology; Vascular Neurology  [address], [address]  [place] [address]  Phone: [phone]  Fax: [phone]  Follow Up Time: 1-3 Days    Willig, Jeffrey L (MD)  Ophthalmology  [address] [place] [address]  Phone: [phone]  Fax: [phone]  Follow Up Time: 1-3 Days

## 2020-02-12 NOTE — ED CDU PROVIDER INITIAL DAY NOTE - PROGRESS NOTE DETAILS
SHIFT CHANGE: Pt received from LINDA Foster. Pending MRI. MRI showing stable extraconal lesion medial right orbit adjacent to the medial rectus [id number]. Reviewed all results with Dr. Velazquez. Spoke to Dr. Ho ([facility] radiologist) regarding case, states this is the only lesion seen on MRI and it is not causing any proptosis or mass effect. Spoke to Oncologist Dr. Villeags, reviewed MRI results, he is recommending Neurosurgical evaluation. MRI showing stable extraconal lesion medial right orbit adjacent to the medial rectus [id number]. Reviewed all results with Dr. Velazquez. Spoke to Dr. Ho ([facility] radiologist) regarding case, states this is the only lesion seen on MRI and it is not causing any proptosis or mass effect. Spoke to Oncologist Dr. Villegas, reviewed MRI results, he is recommending Neurosurgical evaluation. Spoke to neurosurgical PA, pending evaluation. Pt updated on plan of care, agreeable. MRI showing "stable extraconal lesion medial right orbit adjacent to the medial rectus [id number]". Reviewed all results with Dr. Velazquez. Spoke to Dr. Ho ([facility] radiologist) regarding case, states this is the only lesion seen on MRI and it is not causing any proptosis or mass effect. Spoke to Oncologist Dr. Villegas, reviewed MRI results, he is recommending Neurosurgical evaluation. Spoke to neurosurgical PA, pending evaluation. Pt updated on plan of care, agreeable. MERY Benites NOTE: Per Neurosurgery- NO BRAIN METS, PLAN: NEED NEUROLOGY, OPTHALMOLOGY   WILL NEED F/U MR BRAIN W & W/O CONT, NO NEUROSURGICAL INTERVENTION." Spoke to Oncologist Dr. Villegas, reviewed neurosurgical recommendations, states he is agreeable with plan and advise pt to keep Onc appt tomorrow. Reviewed all results and plan with Dr. Velazquez, will give follow up for Optho and Neurology, pt has appt for Oncologist tomorrow, advised to take Rx Keppra, pt declining librium. VSS, tolerating PO, ambulatory.  Discussion includes results, plan, proper medication use/side effects, and return precautions. Pt advised to f/u with PMD 1-2 days and specialists discussed.  Pt given printed copies of lab/radiology and radiology disc for outpt follow up. Pt verbalized understanding/agreement of plan.

## 2020-02-12 NOTE — ED CDU PROVIDER INITIAL DAY NOTE - OBJECTIVE STATEMENT
58 y/o M diagnosed with colon cancer with liver metastasis 6/2019.  Treatment was delayed till 10/2019 as patient was in alcohol rehabilitation.  Today he was at the surgeon's office when he had a seizure in the waiting room.  Patient admits to drinking again last time yesterday drinking more than 1/2 pint of vodka daily.  Was started on Keppra in ED, CT scan with 2 suspicious lesions in brain likely metastasis.

## 2020-02-12 NOTE — ED CDU PROVIDER DISPOSITION NOTE - PROVIDER TOKENS
PROVIDER:[TOKEN:[[id number]],FOLLOWUP:[1-3 Days]],PROVIDER:[TOKEN:[[id number]],FOLLOWUP:[1-3 Days]]

## 2020-02-13 PROCEDURE — 99284 EMERGENCY DEPT VISIT MOD MDM: CPT

## 2020-02-19 ENCOUNTER — APPOINTMENT (OUTPATIENT)
Dept: SURGICAL ONCOLOGY | Facility: CLINIC | Age: 60
End: 2020-02-19
Payer: MEDICAID

## 2020-02-19 VITALS
BODY MASS INDEX: 25.9 KG/M2 | OXYGEN SATURATION: 98 % | HEIGHT: 71 IN | DIASTOLIC BLOOD PRESSURE: 97 MMHG | WEIGHT: 185.03 LBS | TEMPERATURE: 97.3 F | HEART RATE: 83 BPM | SYSTOLIC BLOOD PRESSURE: 136 MMHG

## 2020-02-19 PROCEDURE — 99214 OFFICE O/P EST MOD 30 MIN: CPT

## 2020-02-19 NOTE — HISTORY OF PRESENT ILLNESS
[de-identified] : 59 year-old male returns for follow up.  Initial baseline screening colonoscopy was performed by Dr. Rd Garcia in September 2016, which showed numerous sizeable polyps in the right colon.  Pathology of all polyps was consistent with fragments of tubular adenoma.  He subsequently underwent a colonoscopy with Dr. Markham for polyp removal in October 2016.  At that time, he was also found to have multiple polyps involving the descending colon, which were not excised given already large number of polyps removed from the ascending colon.  Pathology of one polyp found in the cecum demonstrated tubulovillous adenoma with foci of high grade dysplasia.  He also had a CT in September 2016 which showed no evidence of malignancy but some suggestion of possible gallstones and equivocal findings in the bladder for which pelvic sono was recommended but not performed.\par \par He is s/p robotic extended right colectomy 4/7/17.  Final pathology demonstrated 3 tubular adenomas, no high grade dysplasia or invasive carcinoma, cecal submucosal lipoma with overlying hyperplastic polyps, and 20 negative lymph nodes.\par \par Most recent colonoscopy was performed by Dr. Carmelo Marr on 5/24/19 to evaluate new onset of anemia- Dr. Garcia was not available to do the exam at that time.  Exam revealed 2 hard areas of nodular mucosa at the ileocolonic anastomosis which were biopsied and consistent with adenocarcinoma with LVI present.  A partially obstructing mass was seen in the descending colon at approximately 50 cm which measured 2-3 cm in length.  Pediatric scope was required to traverse the mass.  Pathology was also adenocarcinoma.  The colon proximal to the mass had copious stool which limited evaluation.  There was a 1 cm polyp in the transverse colon but polypectomy was not attempted given findings of the mass in the descending colon. \par \par A CT of the chest, abdomen and pelvis on 6/5/19 revealed lesions in the right hepatic lobe measuring 1.1 cm and 1 cm, and an additional suspected 1.6 cm lesion concerning for metastatic disease.  There is wall thickening involving the left colon and prominent lymph nodes surrounding the bowel anastomosis.  There is non specific stranding along the course of the distal left ureter and urinalysis was recommended to exclude underlying infection. 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 \par \par PET/CT was performed on 8/1/19 and demonstrated multiple FDG avid hepatic lesions corresponding to lesions seen on CT.  There is FDG avid soft tissue at the ileocolic anastomosis and FDG avid soft tissue thickening in the mid descending colon, consistent with recurrent adenocarcinoma.  There are subcentimeter lymph nodes adjacent to the ileocolic anastomosis, probable metastatic but not discretely FDG avid and too small to characterize.\par \par He was subjected to a percutaneous liver biopsy on 9/23/19 which confirmed metastatic adenocarcinoma.\par \par He began FOLFOX + Avastin under the care of Dr. Chris Jules in October 2019.  He was admitted to the hospital in October 2019 with seizure activity.  No evidence of intracranial metastasis.  He was placed on Keppra which he self discontinued.  Seizures were attributed to alcohol withdrawal.\par \par He had an MRI in November 2019 which demonstrated a 10.8 mm enhancing lesion in the right orbit.  He underwent retinal evaluation and was told that no further work up was required.  Post treatment PET/CT performed on 12/13/19 demonstrated FDG uptake at the anastomosis in the right upper quadrant and additional focus in a loop of small bowel inferior to the former focus.  Additional focal areas of uptake in the bowel.  There are small retroperitoneal lymph nodes, some of which demonstrated mild uptake.  Previously seen hepatic foci have resolved and currently there is heterogeneous uptake in the liver but no discrete intense focal areas of uptake.  A right orbital lesion cannot be adequately assessed due to physiologic uptake in the orbital muscles.  There is nonspecific asymmetric uptake in the left palatine tonsil, and FDG avid bilateral cervical lymph nodes which are nonspecific in light of former findings in the head or neck region. \par \par Dr. Jules has asked him to see me today to determine if there is any role for surgery at this point given his excellent response in the liver.   He had a scheduled appointment on 2/12/20, however, suffered a seizure in the waiting room and was evaluated at [facility].  CT of the brain raised concern for metastatic disease, however, subsequent brain MRI was negative (radiology and neurosurgery in agreement).   Seizures again attributed to alcohol withdrawal.  He will follow up with neurology in the near future.\par \par He met with Dr. Jules after his discharge and they discussed reducing the dose of his chemotherapy, however, he prefers to continue the regimen since he has been responding well- scheduled to conclude chemotherapy potentially mid March 2020.  He is feeling well overall.  He has not noticed any blood in his stool recently.  He denies bowel habit changes, thin caliber stools, abdominal pain, nausea or vomiting. \par \par His PMH is otherwise unremarkable.  He has no prior surgeries and no family history of malignancies.  \par \par \par PCP: Dr. Lama Ip\par Referring MD/GI: Dr. Rd Garcia\par GI: Dr. Rock Markham, Dr. Carmelo Marr

## 2020-02-19 NOTE — CONSULT LETTER
[Dear  ___] : Dear  [unfilled], [Consult Letter:] : I had the pleasure of evaluating your patient, [unfilled]. [Sincerely,] : Sincerely, [Consult Closing:] : Thank you very much for allowing me to participate in the care of this patient.  If you have any questions, please do not hesitate to contact me. [DrScott  ___] : Dr. HILL [FreeTextEntry2] : Rd Garcia MD [FreeTextEntry1] : 59 year-old male returns for follow up.  Initial baseline screening colonoscopy was performed by Dr. Rd Garcia in September 2016, which showed numerous sizeable polyps in the right colon.  Pathology of all polyps was consistent with fragments of tubular adenoma.  He subsequently underwent a colonoscopy with Dr. Markham for polyp removal in October 2016.  At that time, he was also found to have multiple polyps involving the descending colon, which were not excised given already large number of polyps removed from the ascending colon.  Pathology of one polyp found in the cecum demonstrated tubulovillous adenoma with foci of high grade dysplasia.  He also had a CT in September 2016 which showed no evidence of malignancy but some suggestion of possible gallstones and equivocal findings in the bladder for which pelvic sono was recommended but not performed.\par \par He is s/p robotic extended right colectomy 4/7/17.  Final pathology demonstrated 3 tubular adenomas, no high grade dysplasia or invasive carcinoma, cecal submucosal lipoma with overlying hyperplastic polyps, and 20 negative lymph nodes.\par \par Most recent colonoscopy was performed by Dr. Carmelo Marr on 5/24/19 to evaluate new onset of anemia- Dr. Garcia was not available to do the exam at that time.  Exam revealed 2 hard areas of nodular mucosa at the ileocolonic anastomosis which were biopsied and consistent with adenocarcinoma with LVI present.  A partially obstructing mass was seen in the descending colon at approximately 50 cm which measured 2-3 cm in length.  Pediatric scope was required to traverse the mass.  Pathology was also adenocarcinoma.  The colon proximal to the mass had copious stool which limited evaluation.  There was a 1 cm polyp in the transverse colon but polypectomy was not attempted given findings of the mass in the descending colon. \par \par A CT of the chest, abdomen and pelvis on 6/5/19 revealed lesions in the right hepatic lobe measuring 1.1 cm and 1 cm, and an additional suspected 1.6 cm lesion concerning for metastatic disease.  There is wall thickening involving the left colon and prominent lymph nodes surrounding the bowel anastomosis.  There is non specific stranding along the course of the distal left ureter and urinalysis was recommended to exclude underlying infection. 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 \par \par PET/CT was performed on 8/1/19 and demonstrated multiple FDG avid hepatic lesions corresponding to lesions seen on CT.  There is FDG avid soft tissue at the ileocolic anastomosis and FDG avid soft tissue thickening in the mid descending colon, consistent with recurrent adenocarcinoma.  There are subcentimeter lymph nodes adjacent to the ileocolic anastomosis, probable metastatic but not discretely FDG avid and too small to characterize.\par \par He was subjected to a percutaneous liver biopsy on 9/23/19 which confirmed metastatic adenocarcinoma.\par \par He began FOLFOX + Avastin under the care of Dr. Chris Jules in October 2019.  He was admitted to the hospital in October 2019 with seizure activity.  No evidence of intracranial metastasis.  He was placed on Keppra which he self discontinued.  Seizures were attributed to alcohol withdrawal.\par \par He had an MRI in November 2019 which demonstrated a 10.8 mm enhancing lesion in the right orbit.  He underwent retinal evaluation and was told that no further work up was required.  Post treatment PET/CT performed on 12/13/19 demonstrated FDG uptake at the anastomosis in the right upper quadrant and additional focus in a loop of small bowel inferior to the former focus.  Additional focal areas of uptake in the bowel.  There are small retroperitoneal lymph nodes, some of which demonstrated mild uptake.  Previously seen hepatic foci have resolved and currently there is heterogeneous uptake in the liver but no discrete intense focal areas of uptake.  A right orbital lesion cannot be adequately assessed due to physiologic uptake in the orbital muscles.  There is nonspecific asymmetric uptake in the left palatine tonsil, and FDG avid bilateral cervical lymph nodes which are nonspecific in light of former findings in the head or neck region. \par \par Dr. Jules has asked him to see me today to determine if there is any role for surgery at this point given his excellent response in the liver.   He had a scheduled appointment on 2/12/20, however, suffered a seizure in the waiting room and was evaluated at [facility].  CT of the brain raised concern for metastatic disease, however, subsequent brain MRI was negative (radiology and neurosurgery in agreement).   Seizures again attributed to alcohol withdrawal.  He will follow up with neurology in the near future.\par \par He met with Dr. Jules after his discharge and they discussed reducing the dose of his chemotherapy, however, he prefers to continue the regimen since he has been responding well- scheduled to conclude chemotherapy potentially mid March 2020.  He is feeling well overall.  He has not noticed any blood in his stool recently.  He denies bowel habit changes, thin caliber stools, abdominal pain, nausea or vomiting. \par \par His PMH is otherwise unremarkable.  He has no prior surgeries and no family history of malignancies.  \par finish chemotx>>>>>CT xchest abdomen and pelvis>>>>>>re assess surgery\par \par \par \par PCP: Dr. Lama Ip\par Referring MD/GI: Dr. Rd Garcia\par GI: Dr. Rock Markham, Dr. Carmelo Marr [FreeTextEntry3] : Dean Cooley MD, FACS, FASCRS\par , Department of Surgery\par Director of the [facility] [place]\par , Minimally Invasive/Robotic Cancer Surgery, Central & Eastern Divisions\par Division of Surgical Oncology

## 2020-02-19 NOTE — PHYSICAL EXAM
[Normal] : supple, no neck mass and thyroid not enlarged [Normal Neck Lymph Nodes] : normal neck lymph nodes  [Normal Supraclavicular Lymph Nodes] : normal supraclavicular lymph nodes [Normal] : oriented to person, place and time, with appropriate affect [de-identified] : Trochar sites well-healed, no mass or hernia.

## 2020-03-01 ENCOUNTER — OUTPATIENT (OUTPATIENT)
Dept: OUTPATIENT SERVICES | Facility: HOSPITAL | Age: 60
LOS: 1 days | End: 2020-03-01
Payer: MEDICAID

## 2020-03-01 DIAGNOSIS — C18.9 MALIGNANT NEOPLASM OF COLON, UNSPECIFIED: Chronic | ICD-10-CM

## 2020-03-01 DIAGNOSIS — N46.9 MALE INFERTILITY, UNSPECIFIED: Chronic | ICD-10-CM

## 2020-03-01 DIAGNOSIS — K63.5 POLYP OF COLON: Chronic | ICD-10-CM

## 2020-03-01 DIAGNOSIS — Z90.49 ACQUIRED ABSENCE OF OTHER SPECIFIED PARTS OF DIGESTIVE TRACT: Chronic | ICD-10-CM

## 2020-03-01 PROCEDURE — G9001: CPT

## 2020-03-03 ENCOUNTER — APPOINTMENT (OUTPATIENT)
Dept: INTERNAL MEDICINE | Facility: CLINIC | Age: 60
End: 2020-03-03
Payer: MEDICAID

## 2020-03-03 ENCOUNTER — NON-APPOINTMENT (OUTPATIENT)
Age: 60
End: 2020-03-03

## 2020-03-03 VITALS
SYSTOLIC BLOOD PRESSURE: 120 MMHG | BODY MASS INDEX: 25.48 KG/M2 | DIASTOLIC BLOOD PRESSURE: 80 MMHG | WEIGHT: 180 LBS | HEIGHT: 70.5 IN

## 2020-03-03 DIAGNOSIS — Z87.898 PERSONAL HISTORY OF OTHER SPECIFIED CONDITIONS: ICD-10-CM

## 2020-03-03 DIAGNOSIS — Z00.00 ENCOUNTER FOR GENERAL ADULT MEDICAL EXAMINATION W/OUT ABNORMAL FINDINGS: ICD-10-CM

## 2020-03-03 DIAGNOSIS — Z86.010 PERSONAL HISTORY OF COLONIC POLYPS: ICD-10-CM

## 2020-03-03 DIAGNOSIS — R19.5 OTHER FECAL ABNORMALITIES: ICD-10-CM

## 2020-03-03 DIAGNOSIS — R19.00 INTRA-ABDOMINAL AND PELVIC SWELLING, MASS AND LUMP, UNSPECIFIED SITE: ICD-10-CM

## 2020-03-03 DIAGNOSIS — Z72.89 OTHER PROBLEMS RELATED TO LIFESTYLE: ICD-10-CM

## 2020-03-03 DIAGNOSIS — Z86.018 PERSONAL HISTORY OF OTHER BENIGN NEOPLASM: ICD-10-CM

## 2020-03-03 PROCEDURE — 99396 PREV VISIT EST AGE 40-64: CPT | Mod: 25

## 2020-03-03 PROCEDURE — 93000 ELECTROCARDIOGRAM COMPLETE: CPT

## 2020-03-03 RX ORDER — OMEGA-3/DHA/EPA/FISH OIL 35-113.5MG
1000 TABLET,CHEWABLE ORAL
Refills: 0 | Status: ACTIVE | COMMUNITY
Start: 2020-03-03

## 2020-03-04 PROBLEM — R19.5 GUAIAC POSITIVE STOOLS: Status: RESOLVED | Noted: 2019-05-21 | Resolved: 2020-03-04

## 2020-03-04 PROBLEM — Z87.898 HISTORY OF SEIZURE: Status: ACTIVE | Noted: 2020-03-04

## 2020-03-04 PROBLEM — Z86.010 HISTORY OF COLON POLYPS: Status: RESOLVED | Noted: 2019-05-21 | Resolved: 2020-03-04

## 2020-03-04 PROBLEM — R19.00 PELVIC MASS IN MALE: Status: RESOLVED | Noted: 2017-01-09 | Resolved: 2020-03-04

## 2020-03-04 PROBLEM — Z87.898 HISTORY OF URINARY FREQUENCY: Status: RESOLVED | Noted: 2019-04-02 | Resolved: 2020-03-04

## 2020-03-18 DIAGNOSIS — Z71.89 OTHER SPECIFIED COUNSELING: ICD-10-CM

## 2020-06-02 ENCOUNTER — APPOINTMENT (OUTPATIENT)
Dept: SURGICAL ONCOLOGY | Facility: CLINIC | Age: 60
End: 2020-06-02

## 2020-06-11 ENCOUNTER — APPOINTMENT (OUTPATIENT)
Dept: SURGICAL ONCOLOGY | Facility: CLINIC | Age: 60
End: 2020-06-11
Payer: MEDICAID

## 2020-06-11 PROCEDURE — 99215 OFFICE O/P EST HI 40 MIN: CPT | Mod: 95

## 2020-06-11 NOTE — CONSULT LETTER
[Dear  ___] : Dear  [unfilled], [Please see my note below.] : Please see my note below. [Consult Closing:] : Thank you very much for allowing me to participate in the care of this patient.  If you have any questions, please do not hesitate to contact me. [Sincerely,] : Sincerely, [FreeTextEntry2] : Chris Jules [FreeTextEntry1] : I saw Mr. Santana for follow up today. I believe he has done well with FOLFOX and should proceed to surgery.  [FreeTextEntry3] : Oscar Manuel

## 2020-06-11 NOTE — HISTORY OF PRESENT ILLNESS
[de-identified] : 60 year-old male returns for follow up.  He was previously being followed by my partner, Dr. Dean Cooley.  Initial baseline screening colonoscopy was performed by Dr. Rd Garcia in September 2016, which showed numerous sizeable polyps in the right colon. Pathology of all polyps was consistent with fragments of tubular adenoma. He subsequently underwent a colonoscopy with Dr. Markham for polyp removal in October 2016. At that time, he was also found to have multiple polyps involving the descending colon, which were not excised given already large number of polyps removed from the ascending colon. Pathology of one polyp found in the cecum demonstrated tubulovillous adenoma with foci of high grade dysplasia. He also had a CT in September 2016 which showed no evidence of malignancy but some suggestion of possible gallstones and equivocal findings in the bladder for which pelvic sono was recommended but not performed.\par \par He is s/p robotic extended right colectomy 4/7/17. Final pathology demonstrated 3 tubular adenomas, no high grade dysplasia or invasive carcinoma, cecal submucosal lipoma with overlying hyperplastic polyps, and 20 negative lymph nodes.\par \par A colonoscopy was performed by Dr. Carmelo Marr on 5/24/19 to evaluate new onset of anemia- Dr. Garcia was not available to do the exam at that time. Exam revealed 2 hard areas of nodular mucosa at the ileocolonic anastomosis which were biopsied and consistent with adenocarcinoma with LVI present. A partially obstructing mass was seen in the descending colon at approximately 50 cm which measured 2-3 cm in length. Pediatric scope was required to traverse the mass. Pathology was also adenocarcinoma. The colon proximal to the mass had copious stool which limited evaluation. There was a 1 cm polyp in the transverse colon but polypectomy was not attempted given findings of the mass in the descending colon. \par \par A CT of the chest, abdomen and pelvis on 6/5/19 revealed lesions in the right hepatic lobe measuring 1.1 cm and 1 cm, and an additional suspected 1.6 cm lesion concerning for metastatic disease. There is wall thickening involving the left colon and prominent lymph nodes surrounding the bowel anastomosis. There is non specific stranding along the course of the distal left ureter and urinalysis was recommended to exclude underlying infection. \par \par He is in recovery from alcohol addition and completed 3 weeks of inpatient therapy. \par \par PET/CT was performed on 8/1/19 and demonstrated multiple FDG avid hepatic lesions corresponding to lesions seen on CT. There is FDG avid soft tissue at the ileocolic anastomosis and FDG avid soft tissue thickening in the mid descending colon, consistent with recurrent adenocarcinoma. There are subcentimeter lymph nodes adjacent to the ileocolic anastomosis, probable metastatic but not discretely FDG avid and too small to characterize.\par \par He was subjected to a percutaneous liver biopsy on 9/23/19 which confirmed metastatic adenocarcinoma.\par \par He began FOLFOX + Avastin under the care of Dr. Chris Jules in October 2019. He was admitted to the hospital in October 2019 with seizure activity. No evidence of intracranial metastasis. He was placed on Keppra which he self discontinued. Seizures were attributed to alcohol withdrawal.\par \par He had an MRI in November 2019 which demonstrated a 10.8 mm enhancing lesion in the right orbit. He underwent retinal evaluation and was told that no further work up was required. Post treatment PET/CT performed on 12/13/19 demonstrated FDG uptake at the anastomosis in the right upper quadrant and additional focus in a loop of small bowel inferior to the former focus. Additional focal areas of uptake in the bowel. There are small retroperitoneal lymph nodes, some of which demonstrated mild uptake. Previously seen hepatic foci have resolved and currently there is heterogeneous uptake in the liver but no discrete intense focal areas of uptake. A right orbital lesion cannot be adequately assessed due to physiologic uptake in the orbital muscles. There is nonspecific asymmetric uptake in the left palatine tonsil, and FDG avid bilateral cervical lymph nodes which are nonspecific in light of former findings in the head or neck region. \par \par Dr. Jules has asked him to revisit with Dr. Cooley to determine if there was any role for surgery at that point given his excellent response in the liver. He had a scheduled appointment on 2/12/20, however, suffered a seizure in the waiting room and was evaluated at [facility]. CT of the brain raised concern for metastatic disease, however, subsequent brain MRI was negative (radiology and neurosurgery in agreement). Seizures again attributed to alcohol withdrawal. He was to follow up with neurology in the near future.\par \par He met with Dr. Jules after his discharge and they discussed reducing the dose of his chemotherapy, however, he preferred to continue the regimen since he had been responding well.  He was scheduled to conclude chemotherapy potentially mid March 2020. \par \par Restaging PET/CT 5/22/20: FDG uptake overlying what appears to be the right colon anastomosis (new from prior, MRI recommended), with no adjacent lymphadenopathy or additional areas of suspected metastatic or recurrent disease elsewhere in the abdomen and pelvis. The right infraorbital lesion is stable.  There is a new suspected punctate micronodule in the right lung (attention to follow up), and bilateral prominent ureters on PET portion of the exam (correlate clinically). \par \par \par PCP: Dr. Daina Nj\par Referring MD/GI: Dr. Rd Garcia\par GI: Dr. Rock Markham, Dr. Carmelo Marr.

## 2020-06-11 NOTE — ASSESSMENT
[FreeTextEntry1] : 60 year old man with colon cancer at his prior ileo-colic anastomosis as well as a synchronous adenocarcinoma in the descending colon. He also developed liver metastases, diagnosed in 8/2019. He has since undergone successful FOLFOX treatment under the care of Dr. Jules with his most recent PET showing resolution of the avidity of his liver metastases.\par \par I counseled him at length about the prospect of returning to the operating room to perform a completion abdominal colectomy, while leaving his rectum as a reservoir (to be surveilled endoscopically, frequently). We discussed the risks of anastomotic leak, bleeding, liver failure, abscess, need to return to OR for a complication, and post-operative diarrhea. He understands and would like to proceed. As for his liver lesions I offered him an intraoperative US of the liver with micro-wave ablation of any residual lesions that can be visualized. We discussed risks of liver failure, while low, being present. He understands and would like to proceed. Instructions for bowel prep, PSTs, COVID testing provided.

## 2020-06-11 NOTE — PHYSICAL EXAM
[FreeTextEntry1] : I was unable to perform a physical exam as this appt was performed via telehealth due to the covid-19 pandemic and safety precautions.

## 2020-06-18 ENCOUNTER — APPOINTMENT (OUTPATIENT)
Dept: SURGICAL ONCOLOGY | Facility: CLINIC | Age: 60
End: 2020-06-18

## 2020-06-29 ENCOUNTER — OUTPATIENT (OUTPATIENT)
Dept: OUTPATIENT SERVICES | Facility: HOSPITAL | Age: 60
LOS: 1 days | End: 2020-06-29
Payer: MEDICAID

## 2020-06-29 VITALS
OXYGEN SATURATION: 98 % | DIASTOLIC BLOOD PRESSURE: 80 MMHG | WEIGHT: 171.96 LBS | HEART RATE: 94 BPM | SYSTOLIC BLOOD PRESSURE: 124 MMHG | RESPIRATION RATE: 15 BRPM | TEMPERATURE: 98 F | HEIGHT: 72 IN

## 2020-06-29 DIAGNOSIS — K63.5 POLYP OF COLON: Chronic | ICD-10-CM

## 2020-06-29 DIAGNOSIS — Z98.890 OTHER SPECIFIED POSTPROCEDURAL STATES: Chronic | ICD-10-CM

## 2020-06-29 DIAGNOSIS — N46.9 MALE INFERTILITY, UNSPECIFIED: Chronic | ICD-10-CM

## 2020-06-29 DIAGNOSIS — C18.9 MALIGNANT NEOPLASM OF COLON, UNSPECIFIED: ICD-10-CM

## 2020-06-29 DIAGNOSIS — Z90.49 ACQUIRED ABSENCE OF OTHER SPECIFIED PARTS OF DIGESTIVE TRACT: Chronic | ICD-10-CM

## 2020-06-29 DIAGNOSIS — C18.9 MALIGNANT NEOPLASM OF COLON, UNSPECIFIED: Chronic | ICD-10-CM

## 2020-06-29 DIAGNOSIS — Z01.818 ENCOUNTER FOR OTHER PREPROCEDURAL EXAMINATION: ICD-10-CM

## 2020-06-29 DIAGNOSIS — G47.33 OBSTRUCTIVE SLEEP APNEA (ADULT) (PEDIATRIC): ICD-10-CM

## 2020-06-29 PROCEDURE — 83036 HEMOGLOBIN GLYCOSYLATED A1C: CPT

## 2020-06-29 PROCEDURE — G0463: CPT

## 2020-06-29 PROCEDURE — 85027 COMPLETE CBC AUTOMATED: CPT

## 2020-06-29 PROCEDURE — 86901 BLOOD TYPING SEROLOGIC RH(D): CPT

## 2020-06-29 PROCEDURE — 85610 PROTHROMBIN TIME: CPT

## 2020-06-29 PROCEDURE — 86900 BLOOD TYPING SEROLOGIC ABO: CPT

## 2020-06-29 PROCEDURE — 82378 CARCINOEMBRYONIC ANTIGEN: CPT

## 2020-06-29 PROCEDURE — 80053 COMPREHEN METABOLIC PANEL: CPT

## 2020-06-29 PROCEDURE — 85730 THROMBOPLASTIN TIME PARTIAL: CPT

## 2020-06-29 PROCEDURE — 86850 RBC ANTIBODY SCREEN: CPT

## 2020-06-29 RX ORDER — ASCORBIC ACID 60 MG
1 TABLET,CHEWABLE ORAL
Qty: 0 | Refills: 0 | DISCHARGE

## 2020-06-29 RX ORDER — PREGABALIN 225 MG/1
1 CAPSULE ORAL
Qty: 0 | Refills: 0 | DISCHARGE

## 2020-06-29 RX ORDER — MULTIVIT-MIN/FERROUS GLUCONATE 9 MG/15 ML
1 LIQUID (ML) ORAL
Qty: 0 | Refills: 0 | DISCHARGE

## 2020-06-29 RX ORDER — NIACIN 50 MG
1 TABLET ORAL
Qty: 0 | Refills: 0 | DISCHARGE

## 2020-06-29 RX ORDER — TAMSULOSIN HYDROCHLORIDE 0.4 MG/1
1 CAPSULE ORAL
Qty: 0 | Refills: 0 | DISCHARGE

## 2020-06-29 RX ORDER — FERROUS SULFATE 325(65) MG
1 TABLET ORAL
Qty: 0 | Refills: 0 | DISCHARGE

## 2020-06-29 NOTE — H&P PST ADULT - NSICDXPROBLEM_GEN_ALL_CORE_FT
PROBLEM DIAGNOSES  Problem: ANSON (obstructive sleep apnea)  Assessment and Plan: Sleep Apnea Protocol     Problem: Malignant neoplasm of colon  Assessment and Plan: Completion abdominal Colectomy  Liver Ultrasound  Possible microwave ablation of liver   pre op COVID testing scheduled for 7/7/2020 in [place]

## 2020-06-29 NOTE — H&P PST ADULT - HISTORY OF PRESENT ILLNESS
60 year old male with hx of colon cancer. Had surgery and chemotherapy. Recent CT/Pet new "polyp" referred for surgery    **Hx Of Alcohol Abuse 60 year old male with hx of colon cancer. Had surgery and chemotherapy. Recent CT/Pet new "polyp" referred for surgery    ** COVID: denies travel, denies known exposure denies symptoms has  COVID testing scheduled for 7/7/2020 in [place]  **Hx Of Alcohol Abuse

## 2020-06-29 NOTE — H&P PST ADULT - NSICDXPASTSURGICALHX_GEN_ALL_CORE_FT
PAST SURGICAL HISTORY:  Colon polyp colonoscopy -- negative biopsies in 2017    History of vascular access device last used 1/2020    Infertility, male, severe vascular procedure for infertility    S/P partial colectomy Robotic Assisted Right Colectomy 2019

## 2020-06-29 NOTE — H&P PST ADULT - ASSESSMENT
CAPRINI SCORE [CLOT]    AGE RELATED RISK FACTORS                                                       MOBILITY RELATED FACTORS  [x ] Age 41-60 years                                            (1 Point)                  [ ] Bed rest                                                        (1 Point)  [ ] Age: 61-74 years                                           (2 Points)                 [ ] Plaster cast                                                   (2 Points)  [ ] Age= 75 years                                              (3 Points)                 [ ] Bed bound for more than 72 hours                 (2 Points)    DISEASE RELATED RISK FACTORS                                               GENDER SPECIFIC FACTORS  [ ] Edema in the lower extremities                       (1 Point)                  [ ] Pregnancy                                                     (1 Point)  [ ] Varicose veins                                               (1 Point)                  [ ] Post-partum < 6 weeks                                   (1 Point)             [ ] BMI > 25 Kg/m2                                            (1 Point)                  [ ] Hormonal therapy  or oral contraception          (1 Point)                 [ ] Sepsis (in the previous month)                        (1 Point)                  [ ] History of pregnancy complications                 (1 point)  [ ] Pneumonia or serious lung disease                                               [ ] Unexplained or recurrent                     (1 Point)           (in the previous month)                               (1 Point)  [ ] Abnormal pulmonary function test                     (1 Point)                 SURGERY RELATED RISK FACTORS  [ ] Acute myocardial infarction                              (1 Point)                 [ ]  Section                                             (1 Point)  [ ] Congestive heart failure (in the previous month)  (1 Point)               [ ] Minor surgery                                                  (1 Point)   [ ] Inflammatory bowel disease                             (1 Point)                 [ ] Arthroscopic surgery                                        (2 Points)  [ ] Central venous access                                      (2 Points)                [x ] General surgery lasting more than 45 minutes   (2 Points)       [ ] Stroke (in the previous month)                          (5 Points)               [ ] Elective arthroplasty                                         (5 Points)                                                                                                                                               HEMATOLOGY RELATED FACTORS                                                 TRAUMA RELATED RISK FACTORS  [ ] Prior episodes of VTE                                     (3 Points)                 [ ] Fracture of the hip, pelvis, or leg                       (5 Points)  [ ] Positive family history for VTE                         (3 Points)                 [ ] Acute spinal cord injury (in the previous month)  (5 Points)  [ ] Prothrombin 41798 A                                     (3 Points)                 [ ] Paralysis  (less than 1 month)                             (5 Points)  [ ] Factor V Leiden                                             (3 Points)                  [ ] Multiple Trauma within 1 month                        (5 Points)  [ ] Lupus anticoagulants                                     (3 Points)                                                           [ ] Anticardiolipin antibodies                               (3 Points)                                                       [ ] High homocysteine in the blood                      (3 Points)                                             [ ] Other congenital or acquired thrombophilia      (3 Points)                                                [ ] Heparin induced thrombocytopenia                  (3 Points)                                          Total Score [       3   ]  The Caprini score indicates this patient is at risk for a VTE event (score 3-5).  Most surgical patients in this group would benefit from pharmacologic prophylaxis.  The surgical team will determine the balance between VTE risk and bleeding risk

## 2020-06-29 NOTE — H&P PST ADULT - NSICDXPASTMEDICALHX_GEN_ALL_CORE_FT
PAST MEDICAL HISTORY:  Colon cancer dx 2019 surgery chemo  liver and brain metsastasis    Colon polyps     Hypertension "labile" -- not on medication    ANSON (obstructive sleep apnea) by criteria PAST MEDICAL HISTORY:  Alcohol abuse attends [employer]    Colon cancer dx 2019 surgery chemo  liver and brain metastasis    Colon polyps     Hypertension "labile" -- not on medication    ANSON (obstructive sleep apnea) by criteria

## 2020-07-01 ENCOUNTER — NON-APPOINTMENT (OUTPATIENT)
Age: 60
End: 2020-07-01

## 2020-07-01 ENCOUNTER — APPOINTMENT (OUTPATIENT)
Dept: INTERNAL MEDICINE | Facility: CLINIC | Age: 60
End: 2020-07-01
Payer: MEDICAID

## 2020-07-01 VITALS
OXYGEN SATURATION: 97 % | SYSTOLIC BLOOD PRESSURE: 120 MMHG | DIASTOLIC BLOOD PRESSURE: 80 MMHG | HEART RATE: 88 BPM | TEMPERATURE: 97.6 F | HEIGHT: 70.5 IN | WEIGHT: 179 LBS | BODY MASS INDEX: 25.34 KG/M2

## 2020-07-01 DIAGNOSIS — Z86.03 PERSONAL HISTORY OF NEOPLASM OF UNCERTAIN BEHAVIOR: ICD-10-CM

## 2020-07-01 DIAGNOSIS — Z86.2 PERSONAL HISTORY OF DISEASES OF THE BLOOD AND BLOOD-FORMING ORGANS AND CERTAIN DISORDERS INVOLVING THE IMMUNE MECHANISM: ICD-10-CM

## 2020-07-01 DIAGNOSIS — N40.0 BENIGN PROSTATIC HYPERPLASIA WITHOUT LOWER URINARY TRACT SYMPMS: ICD-10-CM

## 2020-07-01 DIAGNOSIS — Z86.010 PERSONAL HISTORY OF COLONIC POLYPS: ICD-10-CM

## 2020-07-01 PROCEDURE — 93000 ELECTROCARDIOGRAM COMPLETE: CPT

## 2020-07-01 PROCEDURE — 99214 OFFICE O/P EST MOD 30 MIN: CPT | Mod: 25

## 2020-07-07 ENCOUNTER — APPOINTMENT (OUTPATIENT)
Dept: DISASTER EMERGENCY | Facility: CLINIC | Age: 60
End: 2020-07-07

## 2020-07-07 DIAGNOSIS — Z01.818 ENCOUNTER FOR OTHER PREPROCEDURAL EXAMINATION: ICD-10-CM

## 2020-07-08 LAB — SARS-COV-2 N GENE NPH QL NAA+PROBE: NOT DETECTED

## 2020-07-09 ENCOUNTER — TRANSCRIPTION ENCOUNTER (OUTPATIENT)
Age: 60
End: 2020-07-09

## 2020-07-09 NOTE — H&P PST ADULT - CURRENT SWALLOWING
Patient instructed not to do serratus yet-did not review with her.  She is to do as above if too painful instructed and demonstrated how to do as isometric by holding position and stepping away from door.   (0) swallows foods and liquids w/o difficulty

## 2020-07-10 ENCOUNTER — RESULT REVIEW (OUTPATIENT)
Age: 60
End: 2020-07-10

## 2020-07-10 ENCOUNTER — INPATIENT (INPATIENT)
Facility: HOSPITAL | Age: 60
LOS: 2 days | Discharge: HOME | DRG: 330 | End: 2020-07-13
Attending: SURGERY | Admitting: SURGERY
Payer: MEDICAID

## 2020-07-10 ENCOUNTER — APPOINTMENT (OUTPATIENT)
Dept: SURGICAL ONCOLOGY | Facility: HOSPITAL | Age: 60
End: 2020-07-10

## 2020-07-10 VITALS
OXYGEN SATURATION: 98 % | HEIGHT: 72 IN | SYSTOLIC BLOOD PRESSURE: 121 MMHG | HEART RATE: 73 BPM | WEIGHT: 171.96 LBS | RESPIRATION RATE: 18 BRPM | TEMPERATURE: 98 F | DIASTOLIC BLOOD PRESSURE: 88 MMHG

## 2020-07-10 DIAGNOSIS — Z01.818 ENCOUNTER FOR OTHER PREPROCEDURAL EXAMINATION: ICD-10-CM

## 2020-07-10 DIAGNOSIS — N46.9 MALE INFERTILITY, UNSPECIFIED: Chronic | ICD-10-CM

## 2020-07-10 DIAGNOSIS — C18.9 MALIGNANT NEOPLASM OF COLON, UNSPECIFIED: ICD-10-CM

## 2020-07-10 DIAGNOSIS — Z90.49 ACQUIRED ABSENCE OF OTHER SPECIFIED PARTS OF DIGESTIVE TRACT: Chronic | ICD-10-CM

## 2020-07-10 DIAGNOSIS — Z98.890 OTHER SPECIFIED POSTPROCEDURAL STATES: Chronic | ICD-10-CM

## 2020-07-10 DIAGNOSIS — K63.5 POLYP OF COLON: Chronic | ICD-10-CM

## 2020-07-10 LAB — GLUCOSE BLDC GLUCOMTR-MCNC: 98 MG/DL — SIGNIFICANT CHANGE UP (ref 70–99)

## 2020-07-10 PROCEDURE — 47100 WEDGE BIOPSY OF LIVER: CPT

## 2020-07-10 PROCEDURE — 88305 TISSUE EXAM BY PATHOLOGIST: CPT | Mod: 26

## 2020-07-10 PROCEDURE — 88331 PATH CONSLTJ SURG 1 BLK 1SPC: CPT | Mod: 26

## 2020-07-10 PROCEDURE — 49203: CPT

## 2020-07-10 PROCEDURE — 88307 TISSUE EXAM BY PATHOLOGIST: CPT | Mod: 26

## 2020-07-10 PROCEDURE — 44050 REDUCE BOWEL OBSTRUCTION: CPT

## 2020-07-10 RX ORDER — SODIUM CHLORIDE 9 MG/ML
3 INJECTION INTRAMUSCULAR; INTRAVENOUS; SUBCUTANEOUS EVERY 8 HOURS
Refills: 0 | Status: DISCONTINUED | OUTPATIENT
Start: 2020-07-10 | End: 2020-07-10

## 2020-07-10 RX ORDER — ACETAMINOPHEN 500 MG
1000 TABLET ORAL EVERY 6 HOURS
Refills: 0 | Status: COMPLETED | OUTPATIENT
Start: 2020-07-10 | End: 2020-07-11

## 2020-07-10 RX ORDER — SODIUM CHLORIDE 9 MG/ML
1000 INJECTION, SOLUTION INTRAVENOUS
Refills: 0 | Status: DISCONTINUED | OUTPATIENT
Start: 2020-07-10 | End: 2020-07-11

## 2020-07-10 RX ORDER — CEFOTETAN DISODIUM 1 G
2 VIAL (EA) INJECTION ONCE
Refills: 0 | Status: DISCONTINUED | OUTPATIENT
Start: 2020-07-10 | End: 2020-07-10

## 2020-07-10 RX ORDER — HYDROMORPHONE HYDROCHLORIDE 2 MG/ML
0.25 INJECTION INTRAMUSCULAR; INTRAVENOUS; SUBCUTANEOUS
Refills: 0 | Status: DISCONTINUED | OUTPATIENT
Start: 2020-07-10 | End: 2020-07-10

## 2020-07-10 RX ORDER — ONDANSETRON 8 MG/1
4 TABLET, FILM COATED ORAL ONCE
Refills: 0 | Status: DISCONTINUED | OUTPATIENT
Start: 2020-07-10 | End: 2020-07-10

## 2020-07-10 RX ORDER — LIDOCAINE HCL 20 MG/ML
0.2 VIAL (ML) INJECTION ONCE
Refills: 0 | Status: DISCONTINUED | OUTPATIENT
Start: 2020-07-10 | End: 2020-07-10

## 2020-07-10 RX ORDER — CHLORHEXIDINE GLUCONATE 213 G/1000ML
1 SOLUTION TOPICAL ONCE
Refills: 0 | Status: DISCONTINUED | OUTPATIENT
Start: 2020-07-10 | End: 2020-07-10

## 2020-07-10 RX ORDER — HYDROMORPHONE HYDROCHLORIDE 2 MG/ML
0.5 INJECTION INTRAMUSCULAR; INTRAVENOUS; SUBCUTANEOUS EVERY 6 HOURS
Refills: 0 | Status: DISCONTINUED | OUTPATIENT
Start: 2020-07-10 | End: 2020-07-11

## 2020-07-10 RX ORDER — KETOROLAC TROMETHAMINE 30 MG/ML
15 SYRINGE (ML) INJECTION EVERY 6 HOURS
Refills: 0 | Status: DISCONTINUED | OUTPATIENT
Start: 2020-07-10 | End: 2020-07-11

## 2020-07-10 RX ORDER — ENOXAPARIN SODIUM 100 MG/ML
40 INJECTION SUBCUTANEOUS DAILY
Refills: 0 | Status: DISCONTINUED | OUTPATIENT
Start: 2020-07-10 | End: 2020-07-13

## 2020-07-10 RX ORDER — NALOXONE HYDROCHLORIDE 4 MG/.1ML
0.1 SPRAY NASAL
Refills: 0 | Status: DISCONTINUED | OUTPATIENT
Start: 2020-07-10 | End: 2020-07-11

## 2020-07-10 RX ADMIN — Medication 400 MILLIGRAM(S): at 18:53

## 2020-07-10 RX ADMIN — Medication 15 MILLIGRAM(S): at 18:53

## 2020-07-10 RX ADMIN — SODIUM CHLORIDE 125 MILLILITER(S): 9 INJECTION, SOLUTION INTRAVENOUS at 14:39

## 2020-07-10 NOTE — BRIEF OPERATIVE NOTE - NSICDXBRIEFPROCEDURE_GEN_ALL_CORE_FT
PROCEDURES:  Open liver biopsy 10-Jul-2020 14:23:05  Rina Taylor  Exploratory laparotomy 10-Jul-2020 14:22:56  Rina Taylor

## 2020-07-10 NOTE — PRE-ANESTHESIA EVALUATION ADULT - NSANTHPMHFT_GEN_ALL_CORE
chart and med note reviewed. denies cv/pulm dz - good et no cp/sob. ?hx etoh seizure x 2? - abstained x2 weeks without complication

## 2020-07-10 NOTE — PRE-ANESTHESIA EVALUATION ADULT - NSANTHSNORERD_ENT_A_CORE
Received call back from Tyrese Quiroz at [employer] approving pt addiitonal 7 days with lcd 1/30 and update due 1/31  No

## 2020-07-10 NOTE — BRIEF OPERATIVE NOTE - OPERATION/FINDINGS
Exploratory laparotomy, multiple liver lesions and a peritoneal lesion noted. Found to have a lesion in the sigmoid colon with no evidence of obstruction (two fingerbreaths on palpation).  Peritoneal lesion positive for adenocarcinoma for frozen. Liver biopsy performed in segment 5 for targeted chemotherapy, specimen sent for permanent. Hemostasis achieved, fascia closed with looped running PDS suture.

## 2020-07-10 NOTE — CHART NOTE - NSCHARTNOTEFT_GEN_A_CORE
STATUS POST:      SUBJECTIVE: Pt seen on floor 4.5 hours after procedure. Pt tolerated procedure well. Pt reports: Minimal pain, no flatus, no BM, no nausea or vomiting.  States that he is tolerating clear diet well.  SOB:  [ ] YES [x ] NO  Chest Discomfort: [ ] YES [x ] NO    Nausea: [ ] YES [x ] NO           Vomiting: [ ] YES [ x] NO  Flatus: [ ] YES [ x] NO             Bowel Movement: [ ] YES [x ] NO  Diarrhea: [ ] YES [x ] NO         Constipation: [ ] YES [ x] NO     Pain (0-10):              Pain Control Adequate: [x ] YES [ ] NO  Tolerating PO sips of water: [x ] YES [ ] NO  Bloom:120mL    OBJECTIVE:  PHYSICAL EXAM:  Gen: AAOx3, NAD, appears comfortable resting in bed  HEENT: NC/AT  RESP: Non labored breathing, using nasal cannula  ABDOMEN: Soft, NT, ND.  Incision site C/D/I, no erythema or induration, no masses palpated    Vital Signs Last 24 Hrs  T(C): 36.7 (10 Jul 2020 19:40), Max: 36.7 (10 Jul 2020 19:40)  T(F): 98.1 (10 Jul 2020 19:40), Max: 98.1 (10 Jul 2020 19:40)  HR: 71 (10 Jul 2020 19:40) (60 - 74)  BP: 118/79 (10 Jul 2020 19:40) (108/72 - 133/78)  BP(mean): 97 (10 Jul 2020 16:45) (97 - 97)  RR: 18 (10 Jul 2020 19:40) (14 - 18)  SpO2: 97% (10 Jul 2020 19:40) (95% - 99%)      A/P: 60y Male s/p ex lap for recurrence of colon ca in anastomosis from prior colectomy.  procedure was abandoned 2/2 mets to the peritoneum and Liver.  Liver biopsy was taken.  Patient is stable post op with a bloom placed and he is tolerating clears.  - Diet: Clear liquids  - Activity: OOB and into chair  - Labs: CBC, BMP, Ca ionized, MAG, Blood typing  - Pain medication, Tylenol, Toradol, Dilaudid STATUS POST:  Ex Lap for recurrence of colon ca in anastomosis, Liver mass Bx and peritoneal mass Bx    SUBJECTIVE: Pt seen on floor 4.5 hours after procedure. Pt tolerated procedure well. Pt reports: Minimal pain, no flatus, no BM, no nausea or vomiting.  States that he is tolerating clear diet well.  SOB:  [ ] YES [x ] NO  Chest Discomfort: [ ] YES [x ] NO    Nausea: [ ] YES [x ] NO           Vomiting: [ ] YES [ x] NO  Flatus: [ ] YES [ x] NO             Bowel Movement: [ ] YES [x ] NO  Diarrhea: [ ] YES [x ] NO         Constipation: [ ] YES [ x] NO     Pain (0-10):              Pain Control Adequate: [x ] YES [ ] NO  Tolerating PO sips of water: [x ] YES [ ] NO  Bloom:120mL    OBJECTIVE:  PHYSICAL EXAM:  Gen: AAOx3, NAD, appears comfortable resting in bed  HEENT: NC/AT  RESP: Non labored breathing, using nasal cannula  ABDOMEN: Soft, NT, ND.  Incision site C/D/I, no erythema or induration, no masses palpated    Vital Signs Last 24 Hrs  T(C): 36.7 (10 Jul 2020 19:40), Max: 36.7 (10 Jul 2020 19:40)  T(F): 98.1 (10 Jul 2020 19:40), Max: 98.1 (10 Jul 2020 19:40)  HR: 71 (10 Jul 2020 19:40) (60 - 74)  BP: 118/79 (10 Jul 2020 19:40) (108/72 - 133/78)  BP(mean): 97 (10 Jul 2020 16:45) (97 - 97)  RR: 18 (10 Jul 2020 19:40) (14 - 18)  SpO2: 97% (10 Jul 2020 19:40) (95% - 99%)      A/P: 60y Male s/p ex lap for recurrence of colon ca in anastomosis from prior colectomy.  procedure was abandoned 2/2 mets to the peritoneum and Liver.  Liver biopsy was taken.  Patient is stable post op with a bloom placed and he is tolerating clears.  - Diet: Clear liquids  - Activity: OOB and into chair  - Labs: CBC, BMP, Ca ionized, MAG, Blood typing  - Pain medication, Tylenol, Toradol, Dilaudid

## 2020-07-11 LAB
ANION GAP SERPL CALC-SCNC: 9 MMOL/L — SIGNIFICANT CHANGE UP (ref 5–17)
BUN SERPL-MCNC: 13 MG/DL — SIGNIFICANT CHANGE UP (ref 7–23)
CA-I BLD-SCNC: 1.22 MMOL/L — SIGNIFICANT CHANGE UP (ref 1.12–1.3)
CALCIUM SERPL-MCNC: 8.4 MG/DL — SIGNIFICANT CHANGE UP (ref 8.4–10.5)
CHLORIDE SERPL-SCNC: 102 MMOL/L — SIGNIFICANT CHANGE UP (ref 96–108)
CO2 SERPL-SCNC: 24 MMOL/L — SIGNIFICANT CHANGE UP (ref 22–31)
CREAT SERPL-MCNC: 0.96 MG/DL — SIGNIFICANT CHANGE UP (ref 0.5–1.3)
GLUCOSE SERPL-MCNC: 121 MG/DL — HIGH (ref 70–99)
HCT VFR BLD CALC: 36.3 % — LOW (ref 39–50)
HGB BLD-MCNC: 11.8 G/DL — LOW (ref 13–17)
MAGNESIUM SERPL-MCNC: 2.1 MG/DL — SIGNIFICANT CHANGE UP (ref 1.6–2.6)
MCHC RBC-ENTMCNC: 32.5 GM/DL — SIGNIFICANT CHANGE UP (ref 32–36)
MCHC RBC-ENTMCNC: 34 PG — SIGNIFICANT CHANGE UP (ref 27–34)
MCV RBC AUTO: 104.6 FL — HIGH (ref 80–100)
NRBC # BLD: 0 /100 WBCS — SIGNIFICANT CHANGE UP (ref 0–0)
PLATELET # BLD AUTO: 320 K/UL — SIGNIFICANT CHANGE UP (ref 150–400)
POTASSIUM SERPL-MCNC: 4.8 MMOL/L — SIGNIFICANT CHANGE UP (ref 3.5–5.3)
POTASSIUM SERPL-SCNC: 4.8 MMOL/L — SIGNIFICANT CHANGE UP (ref 3.5–5.3)
RBC # BLD: 3.47 M/UL — LOW (ref 4.2–5.8)
RBC # FLD: 15.2 % — HIGH (ref 10.3–14.5)
SODIUM SERPL-SCNC: 135 MMOL/L — SIGNIFICANT CHANGE UP (ref 135–145)
WBC # BLD: 8.86 K/UL — SIGNIFICANT CHANGE UP (ref 3.8–10.5)
WBC # FLD AUTO: 8.86 K/UL — SIGNIFICANT CHANGE UP (ref 3.8–10.5)

## 2020-07-11 RX ORDER — IBUPROFEN 200 MG
600 TABLET ORAL EVERY 6 HOURS
Refills: 0 | Status: DISCONTINUED | OUTPATIENT
Start: 2020-07-11 | End: 2020-07-13

## 2020-07-11 RX ORDER — OXYCODONE HYDROCHLORIDE 5 MG/1
10 TABLET ORAL EVERY 4 HOURS
Refills: 0 | Status: DISCONTINUED | OUTPATIENT
Start: 2020-07-11 | End: 2020-07-13

## 2020-07-11 RX ORDER — ACETAMINOPHEN 500 MG
650 TABLET ORAL EVERY 6 HOURS
Refills: 0 | Status: DISCONTINUED | OUTPATIENT
Start: 2020-07-11 | End: 2020-07-13

## 2020-07-11 RX ORDER — OXYCODONE HYDROCHLORIDE 5 MG/1
5 TABLET ORAL EVERY 4 HOURS
Refills: 0 | Status: DISCONTINUED | OUTPATIENT
Start: 2020-07-11 | End: 2020-07-13

## 2020-07-11 RX ORDER — HYDROMORPHONE HYDROCHLORIDE 2 MG/ML
0.25 INJECTION INTRAMUSCULAR; INTRAVENOUS; SUBCUTANEOUS EVERY 6 HOURS
Refills: 0 | Status: DISCONTINUED | OUTPATIENT
Start: 2020-07-11 | End: 2020-07-12

## 2020-07-11 RX ADMIN — Medication 15 MILLIGRAM(S): at 06:36

## 2020-07-11 RX ADMIN — Medication 15 MILLIGRAM(S): at 00:42

## 2020-07-11 RX ADMIN — Medication 600 MILLIGRAM(S): at 16:08

## 2020-07-11 RX ADMIN — ENOXAPARIN SODIUM 40 MILLIGRAM(S): 100 INJECTION SUBCUTANEOUS at 12:30

## 2020-07-11 RX ADMIN — Medication 400 MILLIGRAM(S): at 00:42

## 2020-07-11 RX ADMIN — Medication 650 MILLIGRAM(S): at 19:17

## 2020-07-11 RX ADMIN — Medication 400 MILLIGRAM(S): at 06:36

## 2020-07-11 RX ADMIN — Medication 400 MILLIGRAM(S): at 13:25

## 2020-07-11 NOTE — PROGRESS NOTE ADULT - ASSESSMENT
60y Male s/p ex lap for recurrence of colon ca in anastomosis from prior colectomy.  Procedure was abandoned 2/2 mets to the peritoneum and Liver.  Liver biopsy was taken. Patient is recovering well on the floors.      PLAN:  - Advance to regular diet  - DC Pulse Ox  - Activity: OOB and into chair  - Labs: CBC, BMP, Ca ionized, Mg  - Pain medication, Tylenol, Toradol, Dilaudid  - DC Hernández and check TOV @ 12PM  - Plan for possible D/C tomorrow        Reinier Cox MD  blue team pager [phone] 60y Male s/p ex lap for recurrence of colon ca in anastomosis from prior colectomy.  Procedure was abandoned 2/2 mets to the peritoneum and Liver.  Liver biopsy was taken. Patient is recovering well on the floors.      PLAN:  - Advance to regular diet  - DC Pulse Ox  - Activity: OOB and into chair  - Labs: CBC, BMP, Ca ionized, Mg  - Pain medication, Tylenol, Toradol, Dilaudid  - DC Hernández and check TOV @ 430PM  - Plan for possible D/C tomorrow        Reinier Cox MD  blue team pager [phone] 60y Male s/p ex lap for recurrence of colon ca in anastomosis from prior colectomy.  Procedure was abandoned 2/2 mets to the peritoneum and Liver.  Liver biopsy was taken. Patient is recovering well on the floors. Failed TOV at 5PM      PLAN:  - Advance to regular diet  - DC Pulse Ox  - Activity: OOB and into chair  - Labs: CBC, BMP, Ca ionized, Mg  - Pain medication, Tylenol, Toradol, Dilaudid  - DC Hernández and check TOV @ 430PM  - Plan for possible D/C tomorrow        Reinier Cox MD  blue team pager [phone] 60y Male s/p ex lap for recurrence of colon ca in anastomosis from prior colectomy.  Procedure was abandoned 2/2 mets to the peritoneum and Liver.  Liver biopsy was taken. Patient is recovering well on the floors. Failed TOV at 5PM      PLAN:  - Advance to regular diet  - DC Pulse Ox  - Activity: OOB and into chair  - Labs: CBC, BMP, Ca ionized, Mg  - Pain medication, Tylenol, Toradol, Dilaudid  - DC Hernández / passed TOV  - Plan for possible D/C tomorrow        Reinier Cox MD  blue team pager [phone] 60y Male s/p ex lap for recurrence of colon ca in anastomosis from prior colectomy.  Procedure was abandoned 2/2 mets to the peritoneum and Liver.  Liver biopsy was taken. Patient is recovering well on the floors. Failed TOV at 5PM      PLAN:  - Advance to regular diet  - DC Pulse Ox  - Activity: OOB and into chair  - Labs: CBC, BMP, Ca ionized, Mg  - Pain medication, Tylenol, Toradol, Dilaudid  - DC Hernández   - Plan for possible D/C tomorrow        Reinier Cox MD  blue team pager [phone]

## 2020-07-12 LAB
HCT VFR BLD CALC: 34.5 % — LOW (ref 39–50)
HGB BLD-MCNC: 11.4 G/DL — LOW (ref 13–17)
MCHC RBC-ENTMCNC: 33 GM/DL — SIGNIFICANT CHANGE UP (ref 32–36)
MCHC RBC-ENTMCNC: 34 PG — SIGNIFICANT CHANGE UP (ref 27–34)
MCV RBC AUTO: 103 FL — HIGH (ref 80–100)
NRBC # BLD: 0 /100 WBCS — SIGNIFICANT CHANGE UP (ref 0–0)
PLATELET # BLD AUTO: 302 K/UL — SIGNIFICANT CHANGE UP (ref 150–400)
RBC # BLD: 3.35 M/UL — LOW (ref 4.2–5.8)
RBC # FLD: 15.5 % — HIGH (ref 10.3–14.5)
WBC # BLD: 6.14 K/UL — SIGNIFICANT CHANGE UP (ref 3.8–10.5)
WBC # FLD AUTO: 6.14 K/UL — SIGNIFICANT CHANGE UP (ref 3.8–10.5)

## 2020-07-12 NOTE — PROGRESS NOTE ADULT - ASSESSMENT
60y Male s/p ex lap for recurrence of colon ca in anastomosis from prior colectomy.  Procedure was abandoned 2/2 mets to the peritoneum and Liver.  Liver biopsy was taken. Patient is recovering well on the floors. Failed TOV at 5PM yesterday. Due for TOV at 10AM 7/12      PLAN:  - Continue regular diet  - Activity: OOB and into chair  - Labs: CBC, BMP, Ca ionized, Mg  - Pain medication, Tylenol, Toradol, Dilaudid  - TOV today at 10 AM  - Plan for possible D/C today

## 2020-07-12 NOTE — PROGRESS NOTE ADULT - REASON FOR ADMISSION
s/p ex lap for recurrence of colon ca in anastomosis from prior colectomy
s/p ex lap for recurrence of colon ca in anastomosis, Liver mass Bx and peritoneal mass Bx
s/p ex lap for recurrence of mets colon ca in anastomosis from prior colectomy

## 2020-07-13 ENCOUNTER — TRANSCRIPTION ENCOUNTER (OUTPATIENT)
Age: 60
End: 2020-07-13

## 2020-07-13 VITALS
DIASTOLIC BLOOD PRESSURE: 78 MMHG | RESPIRATION RATE: 17 BRPM | OXYGEN SATURATION: 97 % | SYSTOLIC BLOOD PRESSURE: 151 MMHG | HEART RATE: 75 BPM | TEMPERATURE: 98 F

## 2020-07-13 PROCEDURE — 82330 ASSAY OF CALCIUM: CPT

## 2020-07-13 PROCEDURE — 88331 PATH CONSLTJ SURG 1 BLK 1SPC: CPT

## 2020-07-13 PROCEDURE — 85027 COMPLETE CBC AUTOMATED: CPT

## 2020-07-13 PROCEDURE — 83735 ASSAY OF MAGNESIUM: CPT

## 2020-07-13 PROCEDURE — 88307 TISSUE EXAM BY PATHOLOGIST: CPT

## 2020-07-13 PROCEDURE — 80048 BASIC METABOLIC PNL TOTAL CA: CPT

## 2020-07-13 PROCEDURE — 88305 TISSUE EXAM BY PATHOLOGIST: CPT

## 2020-07-13 PROCEDURE — 82962 GLUCOSE BLOOD TEST: CPT

## 2020-07-13 RX ORDER — IBUPROFEN 200 MG
1 TABLET ORAL
Qty: 0 | Refills: 0 | DISCHARGE
Start: 2020-07-13

## 2020-07-13 RX ORDER — ACETAMINOPHEN 500 MG
2 TABLET ORAL
Qty: 0 | Refills: 0 | DISCHARGE
Start: 2020-07-13

## 2020-07-13 RX ORDER — OXYCODONE HYDROCHLORIDE 5 MG/1
1 TABLET ORAL
Qty: 15 | Refills: 0
Start: 2020-07-13 | End: 2020-07-19

## 2020-07-13 NOTE — DISCHARGE NOTE PROVIDER - CARE PROVIDER_API CALL
Oscar Manuel (MD)  Surgery  [address] [place] [address]  Phone: [phone]  Fax: [phone]  Follow Up Time: 2 weeks

## 2020-07-13 NOTE — DISCHARGE NOTE PROVIDER - HOSPITAL COURSE
60 year old male with hx of colon cancer. Had surgery and chemotherapy. Recent CT/PET showed new "polyp" and he was referred for surgery.  On 7/10/2020 he underwent an Exploratory laparotomy where multiple liver lesions and a peritoneal lesions were noted and found to have a lesion in the sigmoid colon with no evidence of obstruction (two fingerbreaths on palpation). Case was aborted after obtaining a liver biopsy. The patient was extubated in the OR, transferred to the PACU in stable condition and then transferred to a surgical floor. Once bowel function returned, his diet was advanced as tolerated. He had daily wound care and his pain was controlled.  At the time of discharge, the patient was hemodynamically stable, tolerating PO diet, voiding urine, passing stool, ambulating and was comfortable with adequate pain control. The patient was instructed to follow up with Dr. Manuel within 1-2 weeks after discharge. The patient felt comfortable with discharge. The patient was discharged to home. The patient had no other issues.

## 2020-07-13 NOTE — DISCHARGE NOTE NURSING/CASE MANAGEMENT/SOCIAL WORK - PATIENT PORTAL LINK FT
You can access the FollowMyHealth Patient Portal offered by [facility] by registering at the following website: [email or web address]. By joining [facility]’s FollowMyHealth portal, you will also be able to view your health information using other applications (apps) compatible with our system.

## 2020-07-13 NOTE — DISCHARGE NOTE PROVIDER - NSDCMRMEDTOKEN_GEN_ALL_CORE_FT
acetaminophen 325 mg oral tablet: 2 tab(s) orally every 6 hours  ferrous sulfate (as elemental iron) 45 mg oral tablet, extended release: 1 tab(s) orally once a day  ibuprofen 600 mg oral tablet: 1 tab(s) orally every 6 hours  Multiple Vitamins oral tablet: 1 tab(s) orally once a day  oxyCODONE 5 mg oral tablet: 1-2  tab(s) orally every 4-6 hours, As Needed MDD:8  Vitamin B12 50 mcg oral tablet: 1 tab(s) orally once a day  Vitamin C 500 mg oral tablet: 1 tab(s) orally once a day

## 2020-07-13 NOTE — DISCHARGE NOTE PROVIDER - NSDCCPCAREPLAN_GEN_ALL_CORE_FT
PRINCIPAL DISCHARGE DIAGNOSIS  Diagnosis: Malignant neoplasm of colon  Assessment and Plan of Treatment: Activity- No heavy lifting or straining over 15 lbs for the next two weeks;  Driving- Please do not drive until your pain is well controlled and you do not need to take pain medications.  You may shower-Do not submerge or scrub incision sites.  Please pat dry incisions/dressings.  Staples will be removed at your first post op visit.      SECONDARY DISCHARGE DIAGNOSES  Diagnosis: ANSON (obstructive sleep apnea)  Assessment and Plan of Treatment:

## 2020-07-13 NOTE — PROGRESS NOTE ADULT - ASSESSMENT
60y Male s/p ex lap for recurrence of colon ca in anastomosis from prior colectomy.  Procedure was abandoned 2/2 mets to the peritoneum and Liver.  Liver biopsy was taken. Patient is recovering well on the floors. Failed TOV at 5PM yesterday. Due for TOV at 10AM 7/12      PLAN:  - Continue regular diet  - Activity: OOB and into chair  - Pain medication  - Plan for D/C today    BLUE SURGERY  [id number]

## 2020-07-13 NOTE — DISCHARGE NOTE PROVIDER - NSDCCPTREATMENT_GEN_ALL_CORE_FT
PRINCIPAL PROCEDURE  Procedure: Exploratory laparotomy  Findings and Treatment: Exploratory laparotomy, multiple liver lesions and a peritoneal lesion noted. Found to have a lesion in the sigmoid colon with no evidence of obstruction (two fingerbreaths on palpation).  Peritoneal lesion positive for adenocarcinoma for frozen. Liver biopsy performed in segment 5 for targeted chemotherapy, specimen sent for permanent. Hemostasis achieved, fascia closed with looped running PDS suture.      SECONDARY PROCEDURE  Procedure: Open liver biopsy  Findings and Treatment:

## 2020-07-13 NOTE — PROGRESS NOTE ADULT - SUBJECTIVE AND OBJECTIVE BOX
BLUE TEAM SURGERY DAILY PROGRESS NOTE:       SUBJECTIVE/ROS: Patient feels well, resting comfortably in bed. Denies passing gas or having a bowel movement.  Denies nausea, vomiting, chest pain, shortness of breath         MEDICATIONS  (STANDING):  acetaminophen  IVPB .. 1000 milliGRAM(s) IV Intermittent every 6 hours  enoxaparin Injectable 40 milliGRAM(s) SubCutaneous daily  ketorolac   Injectable 15 milliGRAM(s) IV Push every 6 hours  lactated ringers. 1000 milliLiter(s) (125 mL/Hr) IV Continuous <Continuous>    MEDICATIONS  (PRN):  HYDROmorphone  Injectable 0.5 milliGRAM(s) IV Push every 6 hours PRN Severe Pain (7 - 10)  naloxone Injectable 0.1 milliGRAM(s) IV Push every 3 minutes PRN For ANY of the following changes in patient status:  A. RR LESS THAN 10 breaths per minute, B. Oxygen saturation LESS THAN 90%, C. Sedation score of 6      OBJECTIVE:    Vital Signs Last 24 Hrs  T(C): 36.4 (11 Jul 2020 05:31), Max: 36.7 (10 Jul 2020 19:40)  T(F): 97.6 (11 Jul 2020 05:31), Max: 98.1 (10 Jul 2020 19:40)  HR: 71 (11 Jul 2020 05:31) (60 - 74)  BP: 128/80 (11 Jul 2020 05:31) (108/72 - 133/78)  BP(mean): 97 (10 Jul 2020 16:45) (97 - 97)  RR: 18 (11 Jul 2020 05:31) (14 - 18)  SpO2: 94% (11 Jul 2020 05:31) (94% - 99%)        I&O's Detail    10 Jul 2020 07:01  -  11 Jul 2020 07:00  --------------------------------------------------------  IN:    IV PiggyBack: 300 mL    lactated ringers.: 2000 mL    Oral Fluid: 560 mL  Total IN: 2860 mL    OUT:    Indwelling Catheter - Urethral: 720 mL  Total OUT: 720 mL    Total NET: 2140 mL          Daily     Daily     LABS:                        11.8   8.86  )-----------( 320      ( 11 Jul 2020 06:48 )             36.3     07-11    135  |  102  |  13  ----------------------------<  121<H>  4.8   |  24  |  0.96    Ca    8.4      11 Jul 2020 06:48  Mg     2.1     07-11                    PHYSICAL EXAM:  Constitutional: well developed, well nourished, NAD  Respiratory: patent airway no labored respirations  Cardiovascular: appears well perfused  Gastrointestinal: abdomen soft, nontender, nondistended.   Psychiatric: oriented x 3; appropriate
BLUE TEAM SURGERY DAILY PROGRESS NOTE:       SUBJECTIVE/ROS: Patient feels well. He has been tolerating a regular diet. Endorses voiding intermittently throughout the night. Denies passing gas or bowel movement. Patient failed his first TOV, voided 200cc with 500cc residual on bladder scan. OOB and ambulating regularly  Denies nausea, vomiting, chest pain, shortness of breath.      MEDICATIONS  (STANDING):  acetaminophen   Tablet .. 650 milliGRAM(s) Oral every 6 hours  enoxaparin Injectable 40 milliGRAM(s) SubCutaneous daily  ibuprofen  Tablet. 600 milliGRAM(s) Oral every 6 hours    MEDICATIONS  (PRN):  HYDROmorphone  Injectable 0.25 milliGRAM(s) IV Push every 6 hours PRN breakthrough pain  oxyCODONE    IR 5 milliGRAM(s) Oral every 4 hours PRN Moderate Pain (4 - 6)  oxyCODONE    IR 10 milliGRAM(s) Oral every 4 hours PRN Severe Pain (7 - 10)      OBJECTIVE:    Vital Signs Last 24 Hrs  T(C): 36.8 (12 Jul 2020 05:17), Max: 36.8 (12 Jul 2020 05:17)  T(F): 98.3 (12 Jul 2020 05:17), Max: 98.3 (12 Jul 2020 05:17)  HR: 65 (12 Jul 2020 05:17) (65 - 87)  BP: 145/94 (12 Jul 2020 05:17) (118/76 - 145/94)  BP(mean): --  RR: 16 (12 Jul 2020 05:17) (16 - 18)  SpO2: 96% (12 Jul 2020 05:17) (95% - 96%)        I&O's Detail    11 Jul 2020 07:01  -  12 Jul 2020 07:00  --------------------------------------------------------  IN:    Oral Fluid: 420 mL  Total IN: 420 mL    OUT:    Indwelling Catheter - Urethral: 300 mL    Intermittent Catheterization - Urethral: 700 mL    Voided: 2150 mL  Total OUT: 3150 mL    Total NET: -2730 mL          Daily     Daily     LABS:                        11.4   6.14  )-----------( 302      ( 12 Jul 2020 07:24 )             34.5     07-11    135  |  102  |  13  ----------------------------<  121<H>  4.8   |  24  |  0.96    Ca    8.4      11 Jul 2020 06:48  Mg     2.1     07-11                    PHYSICAL EXAM:  Constitutional: well developed, well nourished, NAD  HEENT: anicteric, poor dentition  Respiratory: CTAB  Cardiovascular: appears well perfused  Gastrointestinal: abdomen soft, nontender, nondistended. No obvious masses. incisions c/d/i  Psychiatric: oriented x 3; appropriate
BLUE TEAMSURGERY DAILY PROGRESS NOTE:       SUBJECTIVE/ROS: Patient feels well. c/o brief episode of SOB but denies nausea, vomiting, chest pain. No acute events overnight.         MEDICATIONS  (STANDING):  acetaminophen  IVPB .. 1000 milliGRAM(s) IV Intermittent every 6 hours  enoxaparin Injectable 40 milliGRAM(s) SubCutaneous daily  ketorolac   Injectable 15 milliGRAM(s) IV Push every 6 hours  lactated ringers. 1000 milliLiter(s) (125 mL/Hr) IV Continuous <Continuous>    MEDICATIONS  (PRN):  HYDROmorphone  Injectable 0.5 milliGRAM(s) IV Push every 6 hours PRN Severe Pain (7 - 10)  naloxone Injectable 0.1 milliGRAM(s) IV Push every 3 minutes PRN For ANY of the following changes in patient status:  A. RR LESS THAN 10 breaths per minute, B. Oxygen saturation LESS THAN 90%, C. Sedation score of 6      OBJECTIVE:    Vital Signs Last 24 Hrs  T(C): 36.5 (11 Jul 2020 10:44), Max: 36.7 (10 Jul 2020 19:40)  T(F): 97.7 (11 Jul 2020 10:44), Max: 98.1 (10 Jul 2020 19:40)  HR: 87 (11 Jul 2020 10:44) (60 - 87)  BP: 118/76 (11 Jul 2020 10:44) (108/72 - 133/78)  BP(mean): 97 (10 Jul 2020 16:45) (97 - 97)  RR: 17 (11 Jul 2020 10:44) (14 - 18)  SpO2: 96% (11 Jul 2020 10:44) (94% - 99%)        I&O's Detail    10 Jul 2020 07:01  -  11 Jul 2020 07:00  --------------------------------------------------------  IN:    IV PiggyBack: 300 mL    lactated ringers.: 2000 mL    Oral Fluid: 560 mL  Total IN: 2860 mL    OUT:    Indwelling Catheter - Urethral: 720 mL  Total OUT: 720 mL    Total NET: 2140 mL          Daily     Daily     LABS:                        11.8   8.86  )-----------( 320      ( 11 Jul 2020 06:48 )             36.3     07-11    135  |  102  |  13  ----------------------------<  121<H>  4.8   |  24  |  0.96    Ca    8.4      11 Jul 2020 06:48  Mg     2.1     07-11                    PHYSICAL EXAM:  Constitutional: well developed, well nourished, NAD  Eyes: anicteric  ENMT: normal facies, symmetric  Neck: supple  Respiratory: CTA bilaterally  Cardiovascular: RRR  Gastrointestinal: abdomen soft, nontender, nondistended. No obvious masses. No peritonitis  Extremities: FROM, warm  Neurological: intact, non-focal  Skin: no gross lesions  Lymph Nodes: no gross adenopathy  Musculoskeletal: equal strength bilateral upper and lower extremities  Psychiatric: oriented x 3; appropriate
Day 1 of Anesthesia Pain Management Service    SUBJECTIVE:  Pain Scale Score:          [X] Refer to charted pain scores    THERAPY:    s/p 0.300_mg PF morphine on 07/10/2020      MEDICATIONS  (STANDING):  acetaminophen  IVPB .. 1000 milliGRAM(s) IV Intermittent every 6 hours  enoxaparin Injectable 40 milliGRAM(s) SubCutaneous daily  ketorolac   Injectable 15 milliGRAM(s) IV Push every 6 hours  lactated ringers. 1000 milliLiter(s) (125 mL/Hr) IV Continuous <Continuous>    MEDICATIONS  (PRN):  HYDROmorphone  Injectable 0.5 milliGRAM(s) IV Push every 6 hours PRN Severe Pain (7 - 10)  naloxone Injectable 0.1 milliGRAM(s) IV Push every 3 minutes PRN For ANY of the following changes in patient status:  A. RR LESS THAN 10 breaths per minute, B. Oxygen saturation LESS THAN 90%, C. Sedation score of 6      OBJECTIVE:    Sedation:        	[X] Alert	[ ] Drowsy	[ ] Arousable      [ ] Asleep       [ ] Unresponsive    Side Effects:	[X] None	[ ] Nausea	[ ] Vomiting         [ ] Pruritus  		[ ] Weakness            [ ] Numbness	          [ ] Other:    Vital Signs Last 24 Hrs  T(C): 36.4 (11 Jul 2020 05:31), Max: 36.7 (10 Jul 2020 19:40)  T(F): 97.6 (11 Jul 2020 05:31), Max: 98.1 (10 Jul 2020 19:40)  HR: 71 (11 Jul 2020 05:31) (60 - 74)  BP: 128/80 (11 Jul 2020 05:31) (108/72 - 133/78)  BP(mean): 97 (10 Jul 2020 16:45) (97 - 97)  RR: 18 (11 Jul 2020 05:31) (14 - 18)  SpO2: 94% (11 Jul 2020 05:31) (94% - 99%)    ASSESSMENT/ PLAN  [X] Patient transitioned to prn analgesics  [X] Pain management per primary service, pain service to sign off   [X]Documentation and Verification of current medications
GENERAL SURGERY PROGRESS NOTE    SUBJECTIVE  Patient seen and examined.   denies pain  wants to go home        OBJECTIVE    PHYSICAL EXAM  General: Appears well, NAD  CHEST: breathing comfortably  CV: appears well perfused  Abdomen: soft, nontender, nondistended, incision c/d/i  Extremities: Grossly symmetric    T(C): 36.4 (07-13-20 @ 09:32), Max: 36.9 (07-12-20 @ 13:37)  HR: 75 (07-13-20 @ 09:32) (71 - 79)  BP: 151/78 (07-13-20 @ 09:32) (127/80 - 151/94)  RR: 17 (07-13-20 @ 09:32) (16 - 18)  SpO2: 97% (07-13-20 @ 09:32) (94% - 97%)    07-12-20 @ 07:01  -  07-13-20 @ 07:00  --------------------------------------------------------  IN: 460 mL / OUT: 3000 mL / NET: -2540 mL    07-13-20 @ 07:01  -  07-13-20 @ 12:24  --------------------------------------------------------  IN: 320 mL / OUT: 300 mL / NET: 20 mL        MEDICATIONS  acetaminophen   Tablet .. 650 milliGRAM(s) Oral every 6 hours  enoxaparin Injectable 40 milliGRAM(s) SubCutaneous daily  ibuprofen  Tablet. 600 milliGRAM(s) Oral every 6 hours  oxyCODONE    IR 5 milliGRAM(s) Oral every 4 hours PRN  oxyCODONE    IR 10 milliGRAM(s) Oral every 4 hours PRN      LABS                        11.4   6.14  )-----------( 302      ( 12 Jul 2020 07:24 )             34.5                 RADIOLOGY & ADDITIONAL STUDIES

## 2020-07-14 PROBLEM — F10.10 ALCOHOL ABUSE, UNCOMPLICATED: Chronic | Status: ACTIVE | Noted: 2020-06-29

## 2020-07-14 PROBLEM — C18.9 MALIGNANT NEOPLASM OF COLON, UNSPECIFIED: Chronic | Status: ACTIVE | Noted: 2020-02-12

## 2020-07-14 PROBLEM — I10 ESSENTIAL (PRIMARY) HYPERTENSION: Chronic | Status: ACTIVE | Noted: 2017-03-22

## 2020-07-14 PROBLEM — G47.33 OBSTRUCTIVE SLEEP APNEA (ADULT) (PEDIATRIC): Chronic | Status: ACTIVE | Noted: 2020-06-29

## 2020-07-16 LAB — SURGICAL PATHOLOGY STUDY: SIGNIFICANT CHANGE UP

## 2020-07-17 NOTE — ED ADULT NURSE REASSESSMENT NOTE - ED CARDIAC CAPILLARY REFILL
[FreeTextEntry1] : Ms. COOPER presents with finding of abnormal EEG. By description, the abnormal mentation/lethargy that led to the study was not likely from seizure and in any event, no seizure were detected over several days of EEG recording. She also recalls that she was on buproprion at time of admission.  Given the history of seizures in her brother, which he "outgrew", it seems possible that Ms. COOPER has a genetic susceptibility but has never had a seizure. We discussed seizure safety, avoiding ladders, swimming precautions. We also discussed driving. As [facility] law does not address epileptiform EEG findings and Ms. COOPER has never had a seizure, I did not advise that she should stop driving. Repeat amb EEG is normal. I do not think there is any indication for seizure medication or driving restriction. \par \par 1. no AED for now, Ms. COOPER was counseled to return if there are any events suspicious for seizures. She was also counselled to let her psychiatrist know that I recommend avoiding buproprion.\par 2. RTC prn\par 3. seizure safety and risk reduction discussed. 
2 seconds or less
2 seconds or less

## 2020-07-21 ENCOUNTER — APPOINTMENT (OUTPATIENT)
Dept: SURGICAL ONCOLOGY | Facility: CLINIC | Age: 60
End: 2020-07-21
Payer: MEDICAID

## 2020-07-21 VITALS
DIASTOLIC BLOOD PRESSURE: 110 MMHG | SYSTOLIC BLOOD PRESSURE: 160 MMHG | WEIGHT: 172 LBS | TEMPERATURE: 97.3 F | RESPIRATION RATE: 18 BRPM | BODY MASS INDEX: 24.35 KG/M2 | HEART RATE: 101 BPM | OXYGEN SATURATION: 97 % | HEIGHT: 70.5 IN

## 2020-07-21 PROCEDURE — 99024 POSTOP FOLLOW-UP VISIT: CPT

## 2020-07-27 NOTE — REASON FOR VISIT
[de-identified] : exploratory laparotomy, liver biopsy, peritoneal biopsy [de-identified] : 7/10/20 [Spouse] : spouse [de-identified] : 2

## 2020-07-27 NOTE — ASSESSMENT
[FreeTextEntry1] : 60 year old man recovering well 2 weeks after surgery. We planned for total abdominal colectomy and possible liver microwave ablation given his good response to systemic chemotherapy. Unfortunately I encountered multiple foci of low-volume peritoneal carcinomatosis. I made the judgement at that time that total colectomy with liver ablation was not indicated given the disease had spread to the peritoneal cavity, especially given that his colon lesions were well treated and his bowel lumen widely patent.\par \par We discussed his good response to chemotherapy and the fact that continued treatment likely represented his best course of action going forward. Also I told him that since I performed the liver wedge resection of one of his lesions, this tissue can perhaps be used for advanced genetic/foundation 1 testing should he stop responding to conventional chemotherapy. \par \par I told him he should resume follow up with Dr. Jules to resume systemic therapy and can return to see me as needed.

## 2020-07-27 NOTE — PHYSICAL EXAM
[Normal] : supple, no neck mass and thyroid not enlarged [Normal Neck Lymph Nodes] : normal neck lymph nodes  [Normal Supraclavicular Lymph Nodes] : normal supraclavicular lymph nodes [Normal Axillary Lymph Nodes] : normal axillary lymph nodes [Normal Groin Lymph Nodes] : normal groin lymph nodes [de-identified] : midline laparotomy incision clean dry, intact [Normal] : oriented to person, place and time, with appropriate affect

## 2020-07-27 NOTE — HISTORY OF PRESENT ILLNESS
[FreeTextEntry1] : Mr. Santana returns for post-op visit following exploratory laparotomy with liver and peritoneal biopsies. His peritoneal tissue revealed multiple foci of metastatic colorectal carcinoma. He reports healing well. He denies pain, PO intolerance, or wound-healing issues.\par \par

## 2020-11-11 ENCOUNTER — APPOINTMENT (OUTPATIENT)
Dept: SURGICAL ONCOLOGY | Facility: CLINIC | Age: 60
End: 2020-11-11
Payer: MEDICAID

## 2020-11-11 VITALS
TEMPERATURE: 97.8 F | DIASTOLIC BLOOD PRESSURE: 97 MMHG | OXYGEN SATURATION: 96 % | HEART RATE: 82 BPM | SYSTOLIC BLOOD PRESSURE: 150 MMHG | BODY MASS INDEX: 26.28 KG/M2 | WEIGHT: 185.8 LBS | RESPIRATION RATE: 18 BRPM

## 2020-11-11 PROCEDURE — 99215 OFFICE O/P EST HI 40 MIN: CPT

## 2020-11-11 PROCEDURE — 99072 ADDL SUPL MATRL&STAF TM PHE: CPT

## 2020-11-11 NOTE — ASSESSMENT
[FreeTextEntry1] : 60 year-old male returns for follow up.  Initial baseline screening colonoscopy was performed by Dr. Rd Garcia in September 2016, which showed numerous sizeable polyps in the right colon.  Pathology of all polyps was consistent with fragments of tubular adenoma.  He subsequently underwent a colonoscopy with Dr. Markham for polyp removal in October 2016.  At that time, he was also found to have multiple polyps involving the descending colon, which were not excised given already large number of polyps removed from the ascending colon.  Pathology of one polyp found in the cecum demonstrated tubulovillous adenoma with foci of high grade dysplasia.  He also had a CT in September 2016 which showed no evidence of malignancy but some suggestion of possible gallstones and equivocal findings in the bladder for which pelvic sono was recommended but not performed.\par \par He is s/p robotic extended right colectomy 4/7/17.  Final pathology demonstrated 3 tubular adenomas, no high grade dysplasia or invasive carcinoma, cecal submucosal lipoma with overlying hyperplastic polyps, and 20 negative lymph nodes.\par \par Colonoscopy was performed by Dr. Carmelo Marr on 5/24/19 to evaluate new onset of anemia- Dr. Garcia was not available to do the exam at that time.  Exam revealed 2 hard areas of nodular mucosa at the ileocolonic anastomosis which were biopsied and consistent with adenocarcinoma with LVI present.  A partially obstructing mass was seen in the descending colon at approximately 50 cm which measured 2-3 cm in length.  Pediatric scope was required to traverse the mass.  Pathology was also adenocarcinoma.  The colon proximal to the mass had copious stool which limited evaluation.  There was a 1 cm polyp in the transverse colon but polypectomy was not attempted given findings of the mass in the descending colon. \par \par A CT of the chest, abdomen and pelvis on 6/5/19 revealed lesions in the right hepatic lobe measuring 1.1 cm and 1 cm, and an additional suspected 1.6 cm lesion concerning for metastatic disease.  There is wall thickening involving the left colon and prominent lymph nodes surrounding the bowel anastomosis.  There is non specific stranding along the course of the distal left ureter and urinalysis was recommended to exclude underlying infection. 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 \par \par PET/CT was performed on 8/1/19 and demonstrated multiple FDG avid hepatic lesions corresponding to lesions seen on CT.  There is FDG avid soft tissue at the ileocolic anastomosis and FDG avid soft tissue thickening in the mid descending colon, consistent with recurrent adenocarcinoma.  There are subcentimeter lymph nodes adjacent to the ileocolic anastomosis, probable metastatic but not discretely FDG avid and too small to characterize.\par \par He was subjected to a percutaneous liver biopsy on 9/23/19 which confirmed metastatic adenocarcinoma.\par \par He began FOLFOX + Avastin under the care of Dr. Chris Jules in October 2019.  He was admitted to the hospital in October 2019 with seizure activity.  No evidence of intracranial metastasis.  He was placed on Keppra which he self discontinued.  Seizures were attributed to alcohol withdrawal.\par \par He had an MRI in November 2019 which demonstrated a 10.8 mm enhancing lesion in the right orbit.  He underwent retinal evaluation and was told that no further work up was required.  Post treatment PET/CT performed on 12/13/19 demonstrated FDG uptake at the anastomosis in the right upper quadrant and additional focus in a loop of small bowel inferior to the former focus.  Additional focal areas of uptake in the bowel.  There are small retroperitoneal lymph nodes, some of which demonstrated mild uptake.  Previously seen hepatic foci have resolved and currently there is heterogeneous uptake in the liver but no discrete intense focal areas of uptake.  A right orbital lesion cannot be adequately assessed due to physiologic uptake in the orbital muscles.  There is nonspecific asymmetric uptake in the left palatine tonsil, and FDG avid bilateral cervical lymph nodes which are nonspecific in light of former findings in the head or neck region. \par \par Dr. Jules has asked him to see me to determine if there is any role for surgery at this point given his excellent response in the liver.   He had a scheduled appointment on 2/12/20, however, suffered a seizure in the waiting room and was evaluated at [facility].  CT of the brain raised concern for metastatic disease, however, subsequent brain MRI was negative (radiology and neurosurgery in agreement).   Seizures again attributed to alcohol withdrawal.  He will follow up with neurology in the near future.\par \par He met with Dr. Jules after his discharge and they discussed reducing the dose of his chemotherapy, however, he prefered to continue the regimen since he has been responding well- scheduled to conclude chemotherapy potentially mid March 2020.\par \par Restaging PET/CT 5/22/20: FDG uptake overlying what appears to be the right colon anastomosis (new from prior, MRI recommended), with no adjacent lymphadenopathy or additional areas of suspected metastatic or recurrent disease elsewhere in the abdomen and pelvis. The right infraorbital lesion is stable. There is a new suspected punctate micronodule in the right lung (attention to follow up), and bilateral prominent ureters on PET portion of the exam (correlate clinically). \par \par He was taken to the OR with Dr. Oscar Lance on 7/10/20 for a planned total abdominal colectomy and possible liver microwave ablation given his good response to systemic chemotherapy.  Unfortunately Dr. Lance encountered multiple foci of low-volume peritoneal carcinomatosis.  Decision was made at that time that total colectomy with liver ablation was not indicated given the disease had spread to the peritoneal cavity, especially given that his colon lesions were well treated and his bowel lumen widely patent.  His peritoneal tissue revealed multiple foci of metastatic colorectal carcinoma. \par \par His PMH is otherwise unremarkable.  He has no prior surgeries and no family history of malignancies. \par \par A&P:\par History of colon cancer with liver metastasis, status post neoadjuvant chemotherapy with good response.  He was taken to the OR with Dr. Oscar Lance on 7/10/20 for a planned total abdominal colectomy and possible liver microwave ablation given his good response to systemic chemotherapy.  Unfortunately Dr. Lance encountered multiple foci of low-volume peritoneal carcinomatosis.  Decision was made at that time that total colectomy with liver ablation was not indicated given the disease had spread to the peritoneal cavity, especially given that his colon lesions were well treated and his bowel lumen widely patent.  His peritoneal tissue revealed multiple foci of metastatic colorectal carcinoma. Patient should follow up with medical oncology for systemic therapy and interval imaging.  I have reviewed the pertinent imaging, blood work and pathology. \par we will svchedule debridement chest wall abscess

## 2020-11-11 NOTE — HISTORY OF PRESENT ILLNESS
[de-identified] : 60 year-old male returns for follow up.  Initial baseline screening colonoscopy was performed by Dr. Rd Garcia in September 2016, which showed numerous sizeable polyps in the right colon.  Pathology of all polyps was consistent with fragments of tubular adenoma.  He subsequently underwent a colonoscopy with Dr. Markham for polyp removal in October 2016.  At that time, he was also found to have multiple polyps involving the descending colon, which were not excised given already large number of polyps removed from the ascending colon.  Pathology of one polyp found in the cecum demonstrated tubulovillous adenoma with foci of high grade dysplasia.  He also had a CT in September 2016 which showed no evidence of malignancy but some suggestion of possible gallstones and equivocal findings in the bladder for which pelvic sono was recommended but not performed.\par \par He is s/p robotic extended right colectomy 4/7/17.  Final pathology demonstrated 3 tubular adenomas, no high grade dysplasia or invasive carcinoma, cecal submucosal lipoma with overlying hyperplastic polyps, and 20 negative lymph nodes.\par \par Colonoscopy was performed by Dr. Carmelo Marr on 5/24/19 to evaluate new onset of anemia- Dr. Garcia was not available to do the exam at that time.  Exam revealed 2 hard areas of nodular mucosa at the ileocolonic anastomosis which were biopsied and consistent with adenocarcinoma with LVI present.  A partially obstructing mass was seen in the descending colon at approximately 50 cm which measured 2-3 cm in length.  Pediatric scope was required to traverse the mass.  Pathology was also adenocarcinoma.  The colon proximal to the mass had copious stool which limited evaluation.  There was a 1 cm polyp in the transverse colon but polypectomy was not attempted given findings of the mass in the descending colon. \par \par A CT of the chest, abdomen and pelvis on 6/5/19 revealed lesions in the right hepatic lobe measuring 1.1 cm and 1 cm, and an additional suspected 1.6 cm lesion concerning for metastatic disease.  There is wall thickening involving the left colon and prominent lymph nodes surrounding the bowel anastomosis.  There is non specific stranding along the course of the distal left ureter and urinalysis was recommended to exclude underlying infection. 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 \par \par PET/CT was performed on 8/1/19 and demonstrated multiple FDG avid hepatic lesions corresponding to lesions seen on CT.  There is FDG avid soft tissue at the ileocolic anastomosis and FDG avid soft tissue thickening in the mid descending colon, consistent with recurrent adenocarcinoma.  There are subcentimeter lymph nodes adjacent to the ileocolic anastomosis, probable metastatic but not discretely FDG avid and too small to characterize.\par \par He was subjected to a percutaneous liver biopsy on 9/23/19 which confirmed metastatic adenocarcinoma.\par \par He began FOLFOX + Avastin under the care of Dr. Chris Jules in October 2019.  He was admitted to the hospital in October 2019 with seizure activity.  No evidence of intracranial metastasis.  He was placed on Keppra which he self discontinued.  Seizures were attributed to alcohol withdrawal.\par \par He had an MRI in November 2019 which demonstrated a 10.8 mm enhancing lesion in the right orbit.  He underwent retinal evaluation and was told that no further work up was required.  Post treatment PET/CT performed on 12/13/19 demonstrated FDG uptake at the anastomosis in the right upper quadrant and additional focus in a loop of small bowel inferior to the former focus.  Additional focal areas of uptake in the bowel.  There are small retroperitoneal lymph nodes, some of which demonstrated mild uptake.  Previously seen hepatic foci have resolved and currently there is heterogeneous uptake in the liver but no discrete intense focal areas of uptake.  A right orbital lesion cannot be adequately assessed due to physiologic uptake in the orbital muscles.  There is nonspecific asymmetric uptake in the left palatine tonsil, and FDG avid bilateral cervical lymph nodes which are nonspecific in light of former findings in the head or neck region. \par \par Dr. Jules has asked him to see me to determine if there is any role for surgery at this point given his excellent response in the liver.   He had a scheduled appointment on 2/12/20, however, suffered a seizure in the waiting room and was evaluated at [facility].  CT of the brain raised concern for metastatic disease, however, subsequent brain MRI was negative (radiology and neurosurgery in agreement).   Seizures again attributed to alcohol withdrawal.  He will follow up with neurology in the near future.\par \par He met with Dr. Jules after his discharge and they discussed reducing the dose of his chemotherapy, however, he prefered to continue the regimen since he has been responding well- scheduled to conclude chemotherapy potentially mid March 2020.\par \par Restaging PET/CT 5/22/20: FDG uptake overlying what appears to be the right colon anastomosis (new from prior, MRI recommended), with no adjacent lymphadenopathy or additional areas of suspected metastatic or recurrent disease elsewhere in the abdomen and pelvis. The right infraorbital lesion is stable. There is a new suspected punctate micronodule in the right lung (attention to follow up), and bilateral prominent ureters on PET portion of the exam (correlate clinically). \par \par He was taken to the OR with Dr. Oscar Lance on 7/10/20 for a planned total abdominal colectomy and possible liver microwave ablation given his good response to systemic chemotherapy.  Unfortunately Dr. Lance encountered multiple foci of low-volume peritoneal carcinomatosis.  Decision was made at that time that total colectomy with liver ablation was not indicated given the disease had spread to the peritoneal cavity, especially given that his colon lesions were well treated and his bowel lumen widely patent.  His peritoneal tissue revealed multiple foci of metastatic colorectal carcinoma. \par \par His PMH is otherwise unremarkable.  He has no prior surgeries and no family history of malignancies.  \par \par \par PCP: Dr. Daina Nj\par Referring MD/GI: Dr. Rd Garcia\par GI: Dr. Rock Markham, Dr. Carmelo Marr

## 2020-11-11 NOTE — CONSULT LETTER
[Dear  ___] : Dear  [unfilled], [Consult Letter:] : I had the pleasure of evaluating your patient, [unfilled]. [Consult Closing:] : Thank you very much for allowing me to participate in the care of this patient.  If you have any questions, please do not hesitate to contact me. [Sincerely,] : Sincerely, [DrScott  ___] : Dr. HILL [FreeTextEntry2] : Rd Garcia MD [FreeTextEntry1] : 60 year-old male returns for follow up.  Initial baseline screening colonoscopy was performed by Dr. Rd Garcia in September 2016, which showed numerous sizeable polyps in the right colon.  Pathology of all polyps was consistent with fragments of tubular adenoma.  He subsequently underwent a colonoscopy with Dr. Markham for polyp removal in October 2016.  At that time, he was also found to have multiple polyps involving the descending colon, which were not excised given already large number of polyps removed from the ascending colon.  Pathology of one polyp found in the cecum demonstrated tubulovillous adenoma with foci of high grade dysplasia.  He also had a CT in September 2016 which showed no evidence of malignancy but some suggestion of possible gallstones and equivocal findings in the bladder for which pelvic sono was recommended but not performed.\par \par He is s/p robotic extended right colectomy 4/7/17.  Final pathology demonstrated 3 tubular adenomas, no high grade dysplasia or invasive carcinoma, cecal submucosal lipoma with overlying hyperplastic polyps, and 20 negative lymph nodes.\par \par Colonoscopy was performed by Dr. Carmelo Marr on 5/24/19 to evaluate new onset of anemia- Dr. Garcia was not available to do the exam at that time.  Exam revealed 2 hard areas of nodular mucosa at the ileocolonic anastomosis which were biopsied and consistent with adenocarcinoma with LVI present.  A partially obstructing mass was seen in the descending colon at approximately 50 cm which measured 2-3 cm in length.  Pediatric scope was required to traverse the mass.  Pathology was also adenocarcinoma.  The colon proximal to the mass had copious stool which limited evaluation.  There was a 1 cm polyp in the transverse colon but polypectomy was not attempted given findings of the mass in the descending colon. \par \par A CT of the chest, abdomen and pelvis on 6/5/19 revealed lesions in the right hepatic lobe measuring 1.1 cm and 1 cm, and an additional suspected 1.6 cm lesion concerning for metastatic disease.  There is wall thickening involving the left colon and prominent lymph nodes surrounding the bowel anastomosis.  There is non specific stranding along the course of the distal left ureter and urinalysis was recommended to exclude underlying infection. 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 \par \par PET/CT was performed on 8/1/19 and demonstrated multiple FDG avid hepatic lesions corresponding to lesions seen on CT.  There is FDG avid soft tissue at the ileocolic anastomosis and FDG avid soft tissue thickening in the mid descending colon, consistent with recurrent adenocarcinoma.  There are subcentimeter lymph nodes adjacent to the ileocolic anastomosis, probable metastatic but not discretely FDG avid and too small to characterize.\par \par He was subjected to a percutaneous liver biopsy on 9/23/19 which confirmed metastatic adenocarcinoma.\par \par He began FOLFOX + Avastin under the care of Dr. Chris Jules in October 2019.  He was admitted to the hospital in October 2019 with seizure activity.  No evidence of intracranial metastasis.  He was placed on Keppra which he self discontinued.  Seizures were attributed to alcohol withdrawal.\par \par He had an MRI in November 2019 which demonstrated a 10.8 mm enhancing lesion in the right orbit.  He underwent retinal evaluation and was told that no further work up was required.  Post treatment PET/CT performed on 12/13/19 demonstrated FDG uptake at the anastomosis in the right upper quadrant and additional focus in a loop of small bowel inferior to the former focus.  Additional focal areas of uptake in the bowel.  There are small retroperitoneal lymph nodes, some of which demonstrated mild uptake.  Previously seen hepatic foci have resolved and currently there is heterogeneous uptake in the liver but no discrete intense focal areas of uptake.  A right orbital lesion cannot be adequately assessed due to physiologic uptake in the orbital muscles.  There is nonspecific asymmetric uptake in the left palatine tonsil, and FDG avid bilateral cervical lymph nodes which are nonspecific in light of former findings in the head or neck region. \par \par Dr. Jules has asked him to see me to determine if there is any role for surgery at this point given his excellent response in the liver.   He had a scheduled appointment on 2/12/20, however, suffered a seizure in the waiting room and was evaluated at [facility].  CT of the brain raised concern for metastatic disease, however, subsequent brain MRI was negative (radiology and neurosurgery in agreement).   Seizures again attributed to alcohol withdrawal.  He will follow up with neurology in the near future.\par \par He met with Dr. Jules after his discharge and they discussed reducing the dose of his chemotherapy, however, he prefered to continue the regimen since he has been responding well- scheduled to conclude chemotherapy potentially mid March 2020.\par \par Restaging PET/CT 5/22/20: FDG uptake overlying what appears to be the right colon anastomosis (new from prior, MRI recommended), with no adjacent lymphadenopathy or additional areas of suspected metastatic or recurrent disease elsewhere in the abdomen and pelvis. The right infraorbital lesion is stable. There is a new suspected punctate micronodule in the right lung (attention to follow up), and bilateral prominent ureters on PET portion of the exam (correlate clinically). \par \par He was taken to the OR with Dr. Oscar Lance on 7/10/20 for a planned total abdominal colectomy and possible liver microwave ablation given his good response to systemic chemotherapy.  Unfortunately Dr. Lance encountered multiple foci of low-volume peritoneal carcinomatosis.  Decision was made at that time that total colectomy with liver ablation was not indicated given the disease had spread to the peritoneal cavity, especially given that his colon lesions were well treated and his bowel lumen widely patent.  His peritoneal tissue revealed multiple foci of metastatic colorectal carcinoma. \par \par His PMH is otherwise unremarkable.  He has no prior surgeries and no family history of malignancies. \par \par A&P:\par History of colon cancer with liver metastasis, status post neoadjuvant chemotherapy with good response.  He was taken to the OR with Dr. Oscar Lance on 7/10/20 for a planned total abdominal colectomy and possible liver microwave ablation given his good response to systemic chemotherapy.  Unfortunately Dr. Lance encountered multiple foci of low-volume peritoneal carcinomatosis.  Decision was made at that time that total colectomy with liver ablation was not indicated given the disease had spread to the peritoneal cavity, especially given that his colon lesions were well treated and his bowel lumen widely patent.  His peritoneal tissue revealed multiple foci of metastatic colorectal carcinoma. Patient should follow up with medical oncology for systemic therapy and interval imaging.  I have reviewed the pertinent imaging, blood work and pathology. \par we will svchedule debridement chest wall abscess\par \par \par \par PCP: Dr. Daina Nj\par Referring MD/GI: Dr. Rd Garcia\par GI: Dr. Rock Markham, Dr. Carmelo Marr [FreeTextEntry3] : Dean Cooley MD, FACS, FASCRS\par , Department of Surgery\par Director of the [facility] [place]\par , Minimally Invasive/Robotic Cancer Surgery, Central & Eastern Divisions\par Division of Surgical Oncology

## 2020-11-16 ENCOUNTER — APPOINTMENT (OUTPATIENT)
Dept: SURGICAL ONCOLOGY | Facility: HOSPITAL | Age: 60
End: 2020-11-16

## 2020-11-23 ENCOUNTER — APPOINTMENT (OUTPATIENT)
Dept: UROLOGY | Facility: CLINIC | Age: 60
End: 2020-11-23

## 2020-11-24 ENCOUNTER — NON-APPOINTMENT (OUTPATIENT)
Age: 60
End: 2020-11-24

## 2021-01-25 ENCOUNTER — NON-APPOINTMENT (OUTPATIENT)
Age: 61
End: 2021-01-25

## 2021-02-08 ENCOUNTER — APPOINTMENT (OUTPATIENT)
Dept: INTERNAL MEDICINE | Facility: CLINIC | Age: 61
End: 2021-02-08

## 2021-02-18 ENCOUNTER — TRANSCRIPTION ENCOUNTER (OUTPATIENT)
Age: 61
End: 2021-02-18

## 2021-02-19 ENCOUNTER — TRANSCRIPTION ENCOUNTER (OUTPATIENT)
Age: 61
End: 2021-02-19

## 2021-02-22 ENCOUNTER — TRANSCRIPTION ENCOUNTER (OUTPATIENT)
Age: 61
End: 2021-02-22

## 2021-03-01 ENCOUNTER — NON-APPOINTMENT (OUTPATIENT)
Age: 61
End: 2021-03-01

## 2021-04-09 ENCOUNTER — NON-APPOINTMENT (OUTPATIENT)
Age: 61
End: 2021-04-09

## 2021-04-12 ENCOUNTER — TRANSCRIPTION ENCOUNTER (OUTPATIENT)
Age: 61
End: 2021-04-12

## 2021-10-25 ENCOUNTER — APPOINTMENT (OUTPATIENT)
Dept: SURGICAL ONCOLOGY | Facility: AMBULATORY SURGERY CENTER | Age: 61
End: 2021-10-25

## 2021-10-27 ENCOUNTER — NON-APPOINTMENT (OUTPATIENT)
Age: 61
End: 2021-10-27

## 2021-11-08 ENCOUNTER — APPOINTMENT (OUTPATIENT)
Dept: INFECTIOUS DISEASE | Facility: CLINIC | Age: 61
End: 2021-11-08

## 2021-11-17 ENCOUNTER — APPOINTMENT (OUTPATIENT)
Dept: INFECTIOUS DISEASE | Facility: CLINIC | Age: 61
End: 2021-11-17
Payer: MEDICARE

## 2021-11-17 ENCOUNTER — NON-APPOINTMENT (OUTPATIENT)
Age: 61
End: 2021-11-17

## 2021-11-17 VITALS
WEIGHT: 163 LBS | BODY MASS INDEX: 23.34 KG/M2 | SYSTOLIC BLOOD PRESSURE: 112 MMHG | HEART RATE: 85 BPM | OXYGEN SATURATION: 98 % | DIASTOLIC BLOOD PRESSURE: 74 MMHG | HEIGHT: 70 IN | TEMPERATURE: 98.5 F | RESPIRATION RATE: 15 BRPM

## 2021-11-17 DIAGNOSIS — M46.44 DISCITIS, UNSPECIFIED, THORACIC REGION: ICD-10-CM

## 2021-11-17 PROCEDURE — 99214 OFFICE O/P EST MOD 30 MIN: CPT

## 2021-11-17 RX ORDER — CEFADROXIL 500 MG/1
500 CAPSULE ORAL
Qty: 56 | Refills: 0 | Status: ACTIVE | COMMUNITY
Start: 2021-11-17 | End: 1900-01-01

## 2021-11-17 NOTE — HISTORY OF PRESENT ILLNESS
[FreeTextEntry1] : Patient here for hospital follow up \par \par 62 y/o man with metastatic colon cancer to peritoneum and liver, ETOH abuse, s/p multiple recent admissions, with h/o MSSA bacteremia s/p 2 weeks IV therapy, with a negative TTE at the time, , h/o back pain -- + vertebral osteomyelitis and T8-9 discitis, with no positive blood cultures a that time, negative repeat TTE, s/p bone biopsy 10/11 showing acute and chronic inflammatory cells, no evidence of malignancy - chronic osteomyelitis , who was discharged on IV Ancef which he took about 4 weeks of ( but was non compliant with doses and ETOH use)\par  s/p recent admission s/p fall with a left infra orbital trauma with eyelid laceration, discharged then on po cefadroxil to complete therapy since he was no longer a good candidate for home IV therapy. \par Patient here for follow up \par \par c/o intermittent back pain with walking and standing for long hours \par He hasn't followed up with oncology or opthalmology \par He has no primary care physician since he moved from [facility]\par He has no other complaints and is tolerating the Abxs well without GI complaints \par \par \par \par

## 2021-11-17 NOTE — PHYSICAL EXAM
[General Appearance - Alert] : alert [General Appearance - In No Acute Distress] : in no acute distress [Sclera] : the sclera and conjunctiva were normal [Outer Ear] : the ears and nose were normal in appearance [Examination Of The Oral Cavity] : the lips and gums were normal [Neck Appearance] : the appearance of the neck was normal [Respiration, Rhythm And Depth] : normal respiratory rhythm and effort [Auscultation Breath Sounds / Voice Sounds] : lungs were clear to auscultation bilaterally [Heart Rate And Rhythm] : heart rate was normal and rhythm regular [Heart Sounds] : normal S1 and S2 [Edema] : there was no peripheral edema [Bowel Sounds] : normal bowel sounds [Abdomen Soft] : soft [Abdomen Tenderness] : non-tender Opt out [Costovertebral Angle Tenderness] : no CVA tenderness [Musculoskeletal - Swelling] : no joint swelling [Range of Motion to Joints] : range of motion to joints [FreeTextEntry1] : no spinal or paraspinal tenderness  [Skin Color & Pigmentation] : normal skin color and pigmentation [] : no rash [Cranial Nerves] : cranial nerves 2-12 were intact [No Focal Deficits] : no focal deficits [Oriented To Time, Place, And Person] : oriented to person, place, and time [Affect] : the affect was normal

## 2021-11-17 NOTE — ASSESSMENT
[FreeTextEntry1] : 60 y/o man with metastatic colon cancer to peritoneum and liver, ETOH abuse, s/p multiple recent admissions, with h/o MSSA bacteremia s/p 2 weeks IV therapy, with a negative TTE at the time, , h/o back pain -- + vertebral osteomyelitis and T8-9 discitis, with no positive blood cultures a that time, negative repeat TTE, s/p bone biopsy 10/11 showing acute and chronic inflammatory cells, no evidence of malignancy - chronic osteomyelitis , who was discharged on IV Ancef which he took about 4 weeks of ( but was non compliant with doses and ETOH use)\par  s/p recent admission s/p fall with a left infra orbital trauma with eyelid laceration, discharged then on po cefadroxil to complete therapy since he was no longer a good candidate for home IV therapy. \par Patient here for follow up \par \par c/o intermittent back pain with walking and standing for long hours \par He hasn't followed up with oncology or opthalmology \par He has no primary care physician since he moved from [facility]\par He has no other complaints and is tolerating the Abxs well without GI complaints \par \par Rec:\par \par 1. CBC, CMP, ESR, CRP today\par 2. Continue Cefadroxil 1gpo q 12h x 2 more weeks ( given a new script) \par 3. Counselled on importance of following up with his other physicians and on establishing a primary care physician \par 4. Needs to scheduled an appointment soon with Oncology \par 5. Follow up in 2 weeks \par \par Plan above d/w patient in detail, questions answered \par  [Treatment Education] : treatment education [Treatment Adherence] : treatment adherence

## 2021-11-17 NOTE — REASON FOR VISIT
[Post Hospitalization] : a post hospitalization visit [FreeTextEntry1] : fu from hospital stay ([facility]) for MSSA bacteremia\par taking Duricef x 2 wks, feels well

## 2021-11-24 LAB
ALBUMIN SERPL ELPH-MCNC: 4.2 G/DL
ALP BLD-CCNC: 73 U/L
ALT SERPL-CCNC: 12 U/L
ANION GAP SERPL CALC-SCNC: 14 MMOL/L
AST SERPL-CCNC: 20 U/L
BASOPHILS # BLD AUTO: 0.07 K/UL
BASOPHILS NFR BLD AUTO: 1 %
BILIRUB SERPL-MCNC: 0.3 MG/DL
BUN SERPL-MCNC: 11 MG/DL
CALCIUM SERPL-MCNC: 10.5 MG/DL
CHLORIDE SERPL-SCNC: 100 MMOL/L
CO2 SERPL-SCNC: 24 MMOL/L
CREAT SERPL-MCNC: 1.14 MG/DL
CRP SERPL-MCNC: <3 MG/L
EOSINOPHIL # BLD AUTO: 0.12 K/UL
EOSINOPHIL NFR BLD AUTO: 1.7 %
ERYTHROCYTE [SEDIMENTATION RATE] IN BLOOD BY WESTERGREN METHOD: 5 MM/HR
GLUCOSE SERPL-MCNC: 124 MG/DL
HCT VFR BLD CALC: 39.2 %
HGB BLD-MCNC: 12.4 G/DL
IMM GRANULOCYTES NFR BLD AUTO: 0.4 %
LYMPHOCYTES # BLD AUTO: 2.03 K/UL
LYMPHOCYTES NFR BLD AUTO: 28.2 %
MAN DIFF?: NORMAL
MCHC RBC-ENTMCNC: 31.3 PG
MCHC RBC-ENTMCNC: 31.6 GM/DL
MCV RBC AUTO: 99 FL
MONOCYTES # BLD AUTO: 0.73 K/UL
MONOCYTES NFR BLD AUTO: 10.2 %
NEUTROPHILS # BLD AUTO: 4.21 K/UL
NEUTROPHILS NFR BLD AUTO: 58.5 %
PLATELET # BLD AUTO: 500 K/UL
POTASSIUM SERPL-SCNC: 4.4 MMOL/L
PROT SERPL-MCNC: 6.1 G/DL
RBC # BLD: 3.96 M/UL
RBC # FLD: 16.7 %
SODIUM SERPL-SCNC: 138 MMOL/L
WBC # FLD AUTO: 7.19 K/UL

## 2021-12-01 ENCOUNTER — APPOINTMENT (OUTPATIENT)
Dept: INFECTIOUS DISEASE | Facility: CLINIC | Age: 61
End: 2021-12-01
Payer: MEDICARE

## 2021-12-01 VITALS
RESPIRATION RATE: 15 BRPM | DIASTOLIC BLOOD PRESSURE: 86 MMHG | SYSTOLIC BLOOD PRESSURE: 132 MMHG | TEMPERATURE: 97.8 F | HEIGHT: 70 IN | HEART RATE: 77 BPM | BODY MASS INDEX: 22.9 KG/M2 | WEIGHT: 160 LBS | OXYGEN SATURATION: 99 %

## 2021-12-01 DIAGNOSIS — C18.9 MALIGNANT NEOPLASM OF COLON, UNSPECIFIED: ICD-10-CM

## 2021-12-01 DIAGNOSIS — F10.21 ALCOHOL DEPENDENCE, IN REMISSION: ICD-10-CM

## 2021-12-01 DIAGNOSIS — M54.9 DORSALGIA, UNSPECIFIED: ICD-10-CM

## 2021-12-01 DIAGNOSIS — M46.20 OSTEOMYELITIS OF VERTEBRA, SITE UNSPECIFIED: ICD-10-CM

## 2021-12-01 PROCEDURE — 99214 OFFICE O/P EST MOD 30 MIN: CPT

## 2021-12-01 NOTE — REASON FOR VISIT
[Follow-Up: _____] : a [unfilled] follow-up visit [FreeTextEntry1] : 2 wk fu on MSSA bacteremia\par taking Cefadroxil, feels well\par pt c/o back pain\par has not yet seen Oncologist, has an appointment 12/7/21 (Dr. Jules @ [facility]) & Pain management on 12/6/21

## 2021-12-01 NOTE — ASSESSMENT
[FreeTextEntry1] : 62 y/o man with metastatic colon cancer to peritoneum and liver, ETOH abuse, s/p multiple recent admissions, with h/o MSSA bacteremia s/p 2 weeks IV therapy, with a negative TTE at the time, , h/o back pain -- + vertebral osteomyelitis and T8-9 discitis, with no positive blood cultures a that time, negative repeat TTE, s/p bone biopsy 10/11 showing acute and chronic inflammatory cells, no evidence of malignancy - chronic osteomyelitis , who was discharged on IV Ancef which he took about 4 weeks of ( but was non compliant with doses and ETOH use)\par  s/p recent admission s/p fall with a left infra orbital trauma with eyelid laceration, discharged then on po cefadroxil to complete therapy since he was no longer a good candidate for home IV therapy. \par Patient here for follow up \par missed several days of Duricef but now back on it again \par Still c/o some back pain with moving and standing for a long time \par Hasn't seen a PCP or Heme/Onc yet \par He stopped drinking and is in a sober house at present \par Blood work reviewed with patient -- ESR and CRP normal, WBC count normal \par \par Rec:\par \par 1. Complete Cefadroxil as prescribed then DC \par 2. Follow up with Heme/onc\par 3. Follow up with spine surgery -- ? need for a repeat MRI - will defer to spine surgery \par 4. Counseled on importance of compliance with medical care and appointments \par 5. f/u prn \par \par Plan above d/w patient in great detail, all questions answered \par \par \par

## 2021-12-01 NOTE — HISTORY OF PRESENT ILLNESS
[FreeTextEntry1] : Patient here for follow up \par \par 62 y/o man with metastatic colon cancer to peritoneum and liver, ETOH abuse, s/p multiple recent admissions, with h/o MSSA bacteremia s/p 2 weeks IV therapy, with a negative TTE at the time, , h/o back pain -- + vertebral osteomyelitis and T8-9 discitis, with no positive blood cultures a that time, negative repeat TTE, s/p bone biopsy 10/11 showing acute and chronic inflammatory cells, no evidence of malignancy - chronic osteomyelitis , who was discharged on IV Ancef which he took about 4 weeks of ( but was non compliant with doses and ETOH use)\par  s/p recent admission s/p fall with a left infra orbital trauma with eyelid laceration, discharged then on po cefadroxil to complete therapy since he was no longer a good candidate for home IV therapy. \par Patient here for follow up \par \par On Cefadroxil -- stopped it for about a week \par has about a week more to go \par \par Still c/o some back pain with moving and standing for a long time \par \par Hasn't seen a PCP or Heme/Onc yet \par He stopped drinking and is in a sober house at present \par \par \par \par \par \par \par \par \par \par

## 2021-12-01 NOTE — PHYSICAL EXAM
[General Appearance - Alert] : alert [General Appearance - In No Acute Distress] : in no acute distress [Sclera] : the sclera and conjunctiva were normal [Outer Ear] : the ears and nose were normal in appearance [Examination Of The Oral Cavity] : the lips and gums were normal [Neck Appearance] : the appearance of the neck was normal [Respiration, Rhythm And Depth] : normal respiratory rhythm and effort [Auscultation Breath Sounds / Voice Sounds] : lungs were clear to auscultation bilaterally [Heart Rate And Rhythm] : heart rate was normal and rhythm regular [Heart Sounds] : normal S1 and S2 [Edema] : there was no peripheral edema [Bowel Sounds] : normal bowel sounds [Abdomen Soft] : soft [Abdomen Tenderness] : non-tender [Costovertebral Angle Tenderness] : no CVA tenderness [Musculoskeletal - Swelling] : no joint swelling [Range of Motion to Joints] : range of motion to joints [FreeTextEntry1] : no spinal or paraspinal tenderness  [Skin Color & Pigmentation] : normal skin color and pigmentation [] : no rash [Cranial Nerves] : cranial nerves 2-12 were intact [No Focal Deficits] : no focal deficits [Oriented To Time, Place, And Person] : oriented to person, place, and time [Affect] : the affect was normal

## 2021-12-01 NOTE — REVIEW OF SYSTEMS
[As Noted in HPI] : as noted in HPI [Joint Swelling] : no joint swelling [Joint Stiffness] : no joint stiffness [Negative] : Heme/Lymph [FreeTextEntry9] : back pain

## 2022-01-03 ENCOUNTER — OUTPATIENT (OUTPATIENT)
Dept: OUTPATIENT SERVICES | Facility: HOSPITAL | Age: 62
LOS: 1 days | End: 2022-01-03

## 2022-01-03 ENCOUNTER — INPATIENT (INPATIENT)
Facility: HOSPITAL | Age: 62
LOS: 2 days | Discharge: NURSING, REHAB OR LONG-TERM CARE | End: 2022-01-06
Admitting: STUDENT IN AN ORGANIZED HEALTH CARE EDUCATION/TRAINING PROGRAM
Payer: MEDICAID

## 2022-01-03 DIAGNOSIS — K63.5 POLYP OF COLON: Chronic | ICD-10-CM

## 2022-01-03 DIAGNOSIS — Z98.890 OTHER SPECIFIED POSTPROCEDURAL STATES: Chronic | ICD-10-CM

## 2022-01-03 DIAGNOSIS — N46.9 MALE INFERTILITY, UNSPECIFIED: Chronic | ICD-10-CM

## 2022-01-03 DIAGNOSIS — Z90.49 ACQUIRED ABSENCE OF OTHER SPECIFIED PARTS OF DIGESTIVE TRACT: Chronic | ICD-10-CM

## 2022-01-03 PROCEDURE — 70498 CT ANGIOGRAPHY NECK: CPT | Mod: 26

## 2022-01-03 PROCEDURE — 93010 ELECTROCARDIOGRAM REPORT: CPT

## 2022-01-03 PROCEDURE — 99285 EMERGENCY DEPT VISIT HI MDM: CPT

## 2022-01-03 PROCEDURE — 71045 X-RAY EXAM CHEST 1 VIEW: CPT | Mod: 26

## 2022-01-03 PROCEDURE — 70496 CT ANGIOGRAPHY HEAD: CPT | Mod: 26

## 2022-01-03 PROCEDURE — 70450 CT HEAD/BRAIN W/O DYE: CPT | Mod: 26,59

## 2022-01-03 PROCEDURE — 0042T: CPT

## 2022-01-04 ENCOUNTER — OUTPATIENT (OUTPATIENT)
Dept: OUTPATIENT SERVICES | Facility: HOSPITAL | Age: 62
LOS: 1 days | End: 2022-01-04

## 2022-01-04 DIAGNOSIS — N46.9 MALE INFERTILITY, UNSPECIFIED: Chronic | ICD-10-CM

## 2022-01-04 DIAGNOSIS — K63.5 POLYP OF COLON: Chronic | ICD-10-CM

## 2022-01-04 DIAGNOSIS — Z90.49 ACQUIRED ABSENCE OF OTHER SPECIFIED PARTS OF DIGESTIVE TRACT: Chronic | ICD-10-CM

## 2022-01-04 DIAGNOSIS — Z98.890 OTHER SPECIFIED POSTPROCEDURAL STATES: Chronic | ICD-10-CM

## 2022-01-04 PROCEDURE — 70553 MRI BRAIN STEM W/O & W/DYE: CPT | Mod: 26

## 2022-01-04 PROCEDURE — 72157 MRI CHEST SPINE W/O & W/DYE: CPT | Mod: 26

## 2022-01-05 ENCOUNTER — OUTPATIENT (OUTPATIENT)
Dept: OUTPATIENT SERVICES | Facility: HOSPITAL | Age: 62
LOS: 1 days | End: 2022-01-05

## 2022-01-05 DIAGNOSIS — Z98.890 OTHER SPECIFIED POSTPROCEDURAL STATES: Chronic | ICD-10-CM

## 2022-01-05 DIAGNOSIS — N46.9 MALE INFERTILITY, UNSPECIFIED: Chronic | ICD-10-CM

## 2022-01-05 DIAGNOSIS — Z90.49 ACQUIRED ABSENCE OF OTHER SPECIFIED PARTS OF DIGESTIVE TRACT: Chronic | ICD-10-CM

## 2022-01-05 DIAGNOSIS — K63.5 POLYP OF COLON: Chronic | ICD-10-CM

## 2022-01-06 ENCOUNTER — OUTPATIENT (OUTPATIENT)
Dept: OUTPATIENT SERVICES | Facility: HOSPITAL | Age: 62
LOS: 1 days | End: 2022-01-06

## 2022-01-06 DIAGNOSIS — Z98.890 OTHER SPECIFIED POSTPROCEDURAL STATES: Chronic | ICD-10-CM

## 2022-01-06 DIAGNOSIS — Z90.49 ACQUIRED ABSENCE OF OTHER SPECIFIED PARTS OF DIGESTIVE TRACT: Chronic | ICD-10-CM

## 2022-01-06 DIAGNOSIS — N46.9 MALE INFERTILITY, UNSPECIFIED: Chronic | ICD-10-CM

## 2022-01-06 DIAGNOSIS — K63.5 POLYP OF COLON: Chronic | ICD-10-CM

## 2022-01-16 NOTE — ED PROVIDER NOTE - CARE PLAN
Go for blood tests as directed. Your doctor will do lab tests at regular visits to monitor the effects of this medicine. Please follow up with your doctor and keep your health care provider appointments. Principal Discharge DX:	Seizure Principal Discharge DX:	Seizure  Secondary Diagnosis:	Alcohol abuse

## 2022-01-24 PROBLEM — R06.83 SNORING: Chronic | Status: INACTIVE | Noted: 2017-04-07 | Resolved: 2020-02-12

## 2022-01-24 PROBLEM — F10.10 ALCOHOL ABUSE, UNCOMPLICATED: Chronic | Status: INACTIVE | Noted: 2017-03-22 | Resolved: 2020-02-12

## 2022-01-24 PROBLEM — K63.5 POLYP OF COLON: Chronic | Status: INACTIVE | Noted: 2017-03-22 | Resolved: 2020-06-29

## 2022-01-24 PROBLEM — R06.83 SNORING: Chronic | Status: INACTIVE | Noted: 2017-03-22 | Resolved: 2020-06-29

## 2022-01-31 DIAGNOSIS — R00.0 TACHYCARDIA, UNSPECIFIED: ICD-10-CM

## 2022-01-31 DIAGNOSIS — R41.82 ALTERED MENTAL STATUS, UNSPECIFIED: ICD-10-CM

## 2022-01-31 DIAGNOSIS — F10.188 ALCOHOL ABUSE WITH OTHER ALCOHOL-INDUCED DISORDER: ICD-10-CM

## 2022-01-31 DIAGNOSIS — N40.0 BENIGN PROSTATIC HYPERPLASIA WITHOUT LOWER URINARY TRACT SYMPTOMS: ICD-10-CM

## 2022-01-31 DIAGNOSIS — C79.9 SECONDARY MALIGNANT NEOPLASM OF UNSPECIFIED SITE: ICD-10-CM

## 2022-01-31 DIAGNOSIS — M86.8X9 OTHER OSTEOMYELITIS, UNSPECIFIED SITES: ICD-10-CM

## 2022-01-31 DIAGNOSIS — C18.9 MALIGNANT NEOPLASM OF COLON, UNSPECIFIED: ICD-10-CM

## 2022-02-02 DIAGNOSIS — N40.0 BENIGN PROSTATIC HYPERPLASIA WITHOUT LOWER URINARY TRACT SYMPTOMS: ICD-10-CM

## 2022-02-02 DIAGNOSIS — R00.0 TACHYCARDIA, UNSPECIFIED: ICD-10-CM

## 2022-02-02 DIAGNOSIS — C18.9 MALIGNANT NEOPLASM OF COLON, UNSPECIFIED: ICD-10-CM

## 2022-02-02 DIAGNOSIS — R41.82 ALTERED MENTAL STATUS, UNSPECIFIED: ICD-10-CM

## 2022-02-02 DIAGNOSIS — M86.8X9 OTHER OSTEOMYELITIS, UNSPECIFIED SITES: ICD-10-CM

## 2022-02-02 DIAGNOSIS — F10.188 ALCOHOL ABUSE WITH OTHER ALCOHOL-INDUCED DISORDER: ICD-10-CM

## 2022-02-14 ENCOUNTER — EMERGENCY (EMERGENCY)
Facility: HOSPITAL | Age: 62
LOS: 1 days | Discharge: HOME | End: 2022-02-14
Admitting: EMERGENCY MEDICINE
Payer: MEDICAID

## 2022-02-14 DIAGNOSIS — K63.5 POLYP OF COLON: Chronic | ICD-10-CM

## 2022-02-14 DIAGNOSIS — N46.9 MALE INFERTILITY, UNSPECIFIED: Chronic | ICD-10-CM

## 2022-02-14 DIAGNOSIS — Z98.890 OTHER SPECIFIED POSTPROCEDURAL STATES: Chronic | ICD-10-CM

## 2022-02-14 DIAGNOSIS — Z90.49 ACQUIRED ABSENCE OF OTHER SPECIFIED PARTS OF DIGESTIVE TRACT: Chronic | ICD-10-CM

## 2022-02-14 PROCEDURE — 99285 EMERGENCY DEPT VISIT HI MDM: CPT

## 2022-02-14 PROCEDURE — 70450 CT HEAD/BRAIN W/O DYE: CPT | Mod: 26

## 2022-02-14 PROCEDURE — 72125 CT NECK SPINE W/O DYE: CPT | Mod: 26

## 2022-02-18 DIAGNOSIS — F10.129 ALCOHOL ABUSE WITH INTOXICATION, UNSPECIFIED: ICD-10-CM

## 2022-02-21 ENCOUNTER — EMERGENCY (EMERGENCY)
Facility: HOSPITAL | Age: 62
LOS: 1 days | Discharge: HOME | End: 2022-02-21
Admitting: EMERGENCY MEDICINE
Payer: MEDICARE

## 2022-02-21 DIAGNOSIS — N46.9 MALE INFERTILITY, UNSPECIFIED: Chronic | ICD-10-CM

## 2022-02-21 DIAGNOSIS — K63.5 POLYP OF COLON: Chronic | ICD-10-CM

## 2022-02-21 DIAGNOSIS — Z90.49 ACQUIRED ABSENCE OF OTHER SPECIFIED PARTS OF DIGESTIVE TRACT: Chronic | ICD-10-CM

## 2022-02-21 DIAGNOSIS — Z98.890 OTHER SPECIFIED POSTPROCEDURAL STATES: Chronic | ICD-10-CM

## 2022-02-21 PROCEDURE — 99282 EMERGENCY DEPT VISIT SF MDM: CPT

## 2022-02-23 DIAGNOSIS — Z45.2 ENCOUNTER FOR ADJUSTMENT AND MANAGEMENT OF VASCULAR ACCESS DEVICE: ICD-10-CM

## 2022-02-23 DIAGNOSIS — Z79.899 OTHER LONG TERM (CURRENT) DRUG THERAPY: ICD-10-CM

## 2022-02-23 DIAGNOSIS — C18.9 MALIGNANT NEOPLASM OF COLON, UNSPECIFIED: ICD-10-CM

## 2022-03-17 NOTE — PATIENT PROFILE ADULT. - MEDICATION PATCH, PROFILE
none Complex Repair And Double Advancement Flap Text: The defect edges were debeveled with a #15 scalpel blade.  The primary defect was closed partially with a complex linear closure.  Given the location of the remaining defect, shape of the defect and the proximity to free margins a double advancement flap was deemed most appropriate for complete closure of the defect.  Using a sterile surgical marker, an appropriate advancement flap was drawn incorporating the defect and placing the expected incisions within the relaxed skin tension lines where possible.    The area thus outlined was incised deep to adipose tissue with a #15 scalpel blade.  The skin margins were undermined to an appropriate distance in all directions utilizing iris scissors.

## 2022-03-19 ENCOUNTER — EMERGENCY (EMERGENCY)
Facility: HOSPITAL | Age: 62
LOS: 1 days | Discharge: HOME | End: 2022-03-19
Admitting: EMERGENCY MEDICINE
Payer: MEDICAID

## 2022-03-19 DIAGNOSIS — Z98.890 OTHER SPECIFIED POSTPROCEDURAL STATES: Chronic | ICD-10-CM

## 2022-03-19 DIAGNOSIS — N46.9 MALE INFERTILITY, UNSPECIFIED: Chronic | ICD-10-CM

## 2022-03-19 DIAGNOSIS — K63.5 POLYP OF COLON: Chronic | ICD-10-CM

## 2022-03-19 DIAGNOSIS — Z90.49 ACQUIRED ABSENCE OF OTHER SPECIFIED PARTS OF DIGESTIVE TRACT: Chronic | ICD-10-CM

## 2022-03-19 PROCEDURE — 99283 EMERGENCY DEPT VISIT LOW MDM: CPT

## 2022-03-21 DIAGNOSIS — L08.89 OTHER SPECIFIED LOCAL INFECTIONS OF THE SKIN AND SUBCUTANEOUS TISSUE: ICD-10-CM

## 2022-03-21 DIAGNOSIS — Z45.2 ENCOUNTER FOR ADJUSTMENT AND MANAGEMENT OF VASCULAR ACCESS DEVICE: ICD-10-CM

## 2022-03-21 DIAGNOSIS — C18.9 MALIGNANT NEOPLASM OF COLON, UNSPECIFIED: ICD-10-CM

## 2022-03-21 DIAGNOSIS — Z95.828 PRESENCE OF OTHER VASCULAR IMPLANTS AND GRAFTS: ICD-10-CM

## 2022-05-12 ENCOUNTER — EMERGENCY (EMERGENCY)
Facility: HOSPITAL | Age: 62
LOS: 1 days | Discharge: HOME | End: 2022-05-12
Admitting: EMERGENCY MEDICINE
Payer: MEDICAID

## 2022-05-12 DIAGNOSIS — Z90.49 ACQUIRED ABSENCE OF OTHER SPECIFIED PARTS OF DIGESTIVE TRACT: Chronic | ICD-10-CM

## 2022-05-12 DIAGNOSIS — Z98.890 OTHER SPECIFIED POSTPROCEDURAL STATES: Chronic | ICD-10-CM

## 2022-05-12 DIAGNOSIS — N46.9 MALE INFERTILITY, UNSPECIFIED: Chronic | ICD-10-CM

## 2022-05-12 DIAGNOSIS — K63.5 POLYP OF COLON: Chronic | ICD-10-CM

## 2022-05-12 PROCEDURE — 99284 EMERGENCY DEPT VISIT MOD MDM: CPT

## 2022-05-13 DIAGNOSIS — F10.10 ALCOHOL ABUSE, UNCOMPLICATED: ICD-10-CM

## 2022-05-13 DIAGNOSIS — Z85.038 PERSONAL HISTORY OF OTHER MALIGNANT NEOPLASM OF LARGE INTESTINE: ICD-10-CM

## 2022-05-14 ENCOUNTER — EMERGENCY (EMERGENCY)
Facility: HOSPITAL | Age: 62
LOS: 1 days | Discharge: HOME | End: 2022-05-14
Admitting: EMERGENCY MEDICINE
Payer: MEDICARE

## 2022-05-14 DIAGNOSIS — N46.9 MALE INFERTILITY, UNSPECIFIED: Chronic | ICD-10-CM

## 2022-05-14 DIAGNOSIS — K63.5 POLYP OF COLON: Chronic | ICD-10-CM

## 2022-05-14 DIAGNOSIS — Z90.49 ACQUIRED ABSENCE OF OTHER SPECIFIED PARTS OF DIGESTIVE TRACT: Chronic | ICD-10-CM

## 2022-05-14 DIAGNOSIS — Z98.890 OTHER SPECIFIED POSTPROCEDURAL STATES: Chronic | ICD-10-CM

## 2022-05-14 PROCEDURE — 99284 EMERGENCY DEPT VISIT MOD MDM: CPT

## 2022-05-17 DIAGNOSIS — F10.129 ALCOHOL ABUSE WITH INTOXICATION, UNSPECIFIED: ICD-10-CM

## 2022-05-17 DIAGNOSIS — Z85.038 PERSONAL HISTORY OF OTHER MALIGNANT NEOPLASM OF LARGE INTESTINE: ICD-10-CM

## 2022-06-10 NOTE — RESULTS/DATA
OB CHECK  6/10/2022    CHIEF COMPLAINT:    Chief Complaint   Patient presents with   • Prenatal Care        Michelle Guerrero is a 30 year old  female at 38w4d with JYOTI:  2022 by 9 wk US.  She presents today for routine OB Check.  Her pregnancy has been complicated by obesity and mood disorder, and past COVID-19 infection.     + fetal movement. No leakage of fluid, abnormal vaginal discharge.     Frustrated that she cannot be induced today. He's over 8 lbs and she is tired of being pregnant.     Objective:  Vitals:    06/10/22 0829   BP: 126/80   Pulse: 80   Resp: 18   Temp: 98.5 °F (36.9 °C)       GENERAL:  Alert and oriented.  No acute distress.  ABDOMEN:  Soft, gravid, Fundal height: 41 cm  FHR:125 bpm  SKIN:  Dry and intact.  No lesions or rashes noted.   EXTREMITIES:  No edema noted.   PSYCHIATRIC:  Mood stable.  Normal affect.    PRENATAL LABS:  Blood Type:  A Rh Positive   Antibody:  negative  GC/CHL:  Negative/Negative   H/H/Plts:  10.7/32.5/330  HepB Surface Antigen:  Negative   HIV:  Non-reactive   Rubella: Immune   RPR:  Non-reactive    Pap Smear:  ASCUS, HPV positive   First Trimester/Quad Screen:  negative  One-hour GTT:  105 and 98   GBS:  Negative       Assessment/Plan:  Michelle Guerrero is a 30 year old  female at 38w4d with JYOTI:  2022 by 9 wk US who is here for routine OB visit.  She is doing well.  Vital signs stable.    Prenatal Care:  - Prenatal labs:  Within normal limits, see above.  - Continue prenatal vitamin.  - Genetic Screen:  Normal  - Dating/Anatomy US: Done   - Tdap done   - Desires tubal ligation, consult done and papers signed 5/10/22  -  testing, recommend to go for NST today     Medical Problem #1:  Obesity   - Normal early 1 hour GTT, normal repeat GTT  - Discussed minimizing weight gain in pregnancy       Medical Problem #2:  Previous history of post-partum pre-eclampsia  - Monitor blood pressures   - Recommend ASA 81 mg daily - patient did not take      Medical Problem #3: UTI versus asymptomatic bacteruria   - E Coli 60,000 - 100,000, asked to repeat. Presented to ED for cramping and treated for UTI  - Negative repeat test of cure on 3/16/22     Medical Problem #4: Iron deficiency anemia   - Hemoglobin of 10.3 on 3/16  - Recommend Ferrous Sulfate 325 mg three days per week - patient not taking     Medical Problem #5: Gestational diabetes in a previous pregnancy  - Early GTT normal and normal GTT at 26 weeks   - normal glucose level on CMP on 4/20 and point of care of glucose on 5/25        Induction scheduled for 6/14/2022. I attempted to see if we could schedule on 6/13 per patient preference now, but no available slots     Discussed warning signs including decreased fetal movement and leakage of fluid       Maru Boyd MD           [FreeTextEntry1] : I have reviewed the pertinent imaging, blood work and pathology.

## 2022-06-21 PROBLEM — Z00.00 ENCOUNTER FOR PREVENTIVE HEALTH EXAMINATION: Noted: 2022-06-21

## 2022-08-03 NOTE — ED PROVIDER NOTE - NS ED MD DISPO ADMIT [facility]  Neurosurgery        Shreya Reyes   [id number]   1959         CHIEF COMPLAINT:    3 week post-op    HPI:    Shreya Reyes is a 63 y.o.-year-old female who presents today for post-operative follow-up.  She is s/p right C5-6, C6-7 laminoforaminotomies that was done on 7/13/2022. Overall she is doing well with significant improvement in preoperative symptoms. Intermittent tingling in 1st and 3rd finger on right side but says this is very infrequent since surgery. She reports she has been able to increase activity level, tolerating well. There have been no postoperative complications.     She is known to Dr. Sandoval following L1 kyphoplasty with spine jack and L3 kyphoplasty on 4/8/2022; and T11, T12 kyphoplasty on 4/21/2022.    Review of patient's allergies indicates:  No Known Allergies    Current Outpatient Medications   Medication Sig Dispense Refill    ALPRAZolam (XANAX) 0.25 MG tablet Take 0.25 mg by mouth every 8 (eight) hours as needed.      calcium carbonate (OS-ORAL) 600 mg calcium (1,500 mg) Tab Take 600 mg by mouth 2 (two) times daily.      cholecalciferol, vitamin D3, 125 mcg (5,000 unit) capsule Take 250 mcg by mouth.      hydrocortisone (CORTEF) 20 MG Tab Take 20 mg by mouth 2 (two) times daily.      ibuprofen (ADVIL,MOTRIN) 800 MG tablet Take 800 mg by mouth 3 (three) times daily as needed.      levothyroxine (SYNTHROID) 75 MCG tablet Take 1 tablet by mouth once daily.      losartan (COZAAR) 50 MG tablet Take 1 tablet by mouth once daily.      oxyCODONE-acetaminophen (PERCOCET) 5-325 mg per tablet Take 1 tablet by mouth every 4 (four) hours as needed for Pain. 40 tablet 0    LYSODREN 500 mg tablet TAKE 5 TABLETS BY MOUTH TWICE DAILY (Patient not taking: Reported on 8/3/2022) 300 tablet 11    pregabalin (LYRICA) 75 MG capsule Take 1 capsule (75 mg total) by mouth 2 (two) times daily. (Patient not taking: Reported on 8/3/2022) 30 capsule 0     No current  "facility-administered medications for this visit.       Past Medical History:   Diagnosis Date    Adrenal cancer     Closed compression fracture of L3 lumbar vertebra, sequela 2022    Closed wedge compression fracture of T11 vertebra 2022    Compression fracture of L1 vertebra     Compression fracture of L3 vertebra     Elevated liver enzymes 2021    Hyperlipidemia     Hypertension 2022    Osteoporosis     Thoracic compression fracture      Past Surgical History:   Procedure Laterality Date    ABDOMINOPLASTY      ADRENALECTOMY Left     L1 kyphoplasty w/ spine barbara, L3 kyphoplasty      Laproscopy for excision of adrenal mass      left cataract Left     MAGNETIC RESONANCE IMAGING N/A 2022    Procedure: MRI (MAGNETIC RESONANCE IMAGING);  Surgeon: Nam Sandoval MD;  Location: [facility];  Service: Neurosurgery;  Laterality: N/A;  MRI CERVICAL SPINE WITHOUT CONTRAST WITH ANESTHESIA    MINIMALLY INVASIVE FORAMINOTOMY CERVICAL SPINE Right 2022    Procedure: FORAMINOTOMY, SPINE, CERVICAL, MINIMALLY INVASIVE;  Surgeon: Nma Sandoval MD;  Location: [facility];  Service: Neurosurgery;  Laterality: Right;  right C5-6, C6-7 posterior foraminotomy    ORIF TIBIA & FIBULA FRACTURES Left     TONSILLECTOMY       Family History     Problem Relation (Age of Onset)    Heart disease Father, Sister, Brother    Hyperlipidemia Father    Lymphoma Mother        Social History     Socioeconomic History    Marital status:    Occupational History    Occupation: RN on leave   Tobacco Use    Smoking status: Former Smoker     Packs/day: 1.00     Types: Cigarettes     Quit date: 2009     Years since quittin.5    Smokeless tobacco: Never Used   Substance and Sexual Activity    Alcohol use: Yes     Comment: social       Review of systems:    Pertinent items are noted in HPI.      Vital Signs  Pulse: 84  Resp: 16  BP: (!) 165/98  Pain Score: 0-No pain  Height: 5' 5" (165.1 cm)  Weight: " 55.8 kg (123 lb)  Body mass index is 20.47 kg/m².      Physical Exam:    General:  Pleasant. Well-nourished. Well-groomed. Alert. Oriented. No acute distress.    Head:  Normocephalic, without obvious abnormality, atraumatic    Lungs:   Breathing is quiet, non-lablored    Neurological:    Oriented to Person, Place, Time   Speech:  normal  Memory, cognition, and affect are appropriate.  Motor Strength: Moves all extremities spontaneously with good tone.  No abnormal movements seen.  normal 5/5 strength in all tested muscle groups  Reflexs    Right Left   Triceps 2+ 2+   Biceps 2+ 2+   Brachioradialis 2+ 2+       Cervical incision:  Healing well without s/s of infection.     Gait:  normal        ASSESSMENT:     1. Cervical spondylosis with radiculopathy     Overall improvement in preoperative symptoms.  Incision healing well without s/s of infection.   Return to clinic as needed.       I, Dr. Nam Sandoval, personally performed the services described in this documentation. All medical record entries made by the gunnaribCarol velazquez NP, were at my direction and in my presence.  I have reviewed the chart and agree that the record reflects my personal performance and is accurate and complete. Nam Sandoval MD.  8:55 AM 08/03/2022           Nam Sandoval MD FACS [facility]         MEDICINE

## 2022-08-25 ENCOUNTER — OUTPATIENT (OUTPATIENT)
Dept: OUTPATIENT SERVICES | Facility: HOSPITAL | Age: 62
LOS: 1 days | End: 2022-08-25

## 2022-08-25 ENCOUNTER — INPATIENT (INPATIENT)
Facility: HOSPITAL | Age: 62
LOS: 0 days | Discharge: HOME | End: 2022-08-26

## 2022-08-25 DIAGNOSIS — Z98.890 OTHER SPECIFIED POSTPROCEDURAL STATES: Chronic | ICD-10-CM

## 2022-08-25 DIAGNOSIS — K63.5 POLYP OF COLON: Chronic | ICD-10-CM

## 2022-08-25 DIAGNOSIS — Z90.49 ACQUIRED ABSENCE OF OTHER SPECIFIED PARTS OF DIGESTIVE TRACT: Chronic | ICD-10-CM

## 2022-08-25 DIAGNOSIS — N46.9 MALE INFERTILITY, UNSPECIFIED: Chronic | ICD-10-CM

## 2022-08-25 PROCEDURE — 99053 MED SERV 10PM-8AM 24 HR FAC: CPT

## 2022-08-25 PROCEDURE — 71045 X-RAY EXAM CHEST 1 VIEW: CPT | Mod: 26

## 2022-08-25 PROCEDURE — 70552 MRI BRAIN STEM W/DYE: CPT | Mod: 26

## 2022-08-25 PROCEDURE — 70450 CT HEAD/BRAIN W/O DYE: CPT | Mod: 26,MA

## 2022-08-25 PROCEDURE — 99284 EMERGENCY DEPT VISIT MOD MDM: CPT

## 2022-08-25 PROCEDURE — 99222 1ST HOSP IP/OBS MODERATE 55: CPT

## 2022-08-26 ENCOUNTER — OUTPATIENT (OUTPATIENT)
Dept: OUTPATIENT SERVICES | Facility: HOSPITAL | Age: 62
LOS: 1 days | End: 2022-08-26

## 2022-08-26 DIAGNOSIS — Z98.890 OTHER SPECIFIED POSTPROCEDURAL STATES: Chronic | ICD-10-CM

## 2022-08-26 DIAGNOSIS — Z90.49 ACQUIRED ABSENCE OF OTHER SPECIFIED PARTS OF DIGESTIVE TRACT: Chronic | ICD-10-CM

## 2022-08-26 DIAGNOSIS — N46.9 MALE INFERTILITY, UNSPECIFIED: Chronic | ICD-10-CM

## 2022-08-26 DIAGNOSIS — K63.5 POLYP OF COLON: Chronic | ICD-10-CM

## 2022-08-26 PROCEDURE — 99232 SBSQ HOSP IP/OBS MODERATE 35: CPT

## 2022-09-06 DIAGNOSIS — Z79.899 OTHER LONG TERM (CURRENT) DRUG THERAPY: ICD-10-CM

## 2022-09-06 DIAGNOSIS — G40.909 EPILEPSY, UNSPECIFIED, NOT INTRACTABLE, WITHOUT STATUS EPILEPTICUS: ICD-10-CM

## 2022-09-06 DIAGNOSIS — R56.9 UNSPECIFIED CONVULSIONS: ICD-10-CM

## 2022-09-06 DIAGNOSIS — C78.6 SECONDARY MALIGNANT NEOPLASM OF RETROPERITONEUM AND PERITONEUM: ICD-10-CM

## 2022-09-06 DIAGNOSIS — F10.20 ALCOHOL DEPENDENCE, UNCOMPLICATED: ICD-10-CM

## 2022-09-06 DIAGNOSIS — C18.9 MALIGNANT NEOPLASM OF COLON, UNSPECIFIED: ICD-10-CM

## 2022-09-06 DIAGNOSIS — C78.01 SECONDARY MALIGNANT NEOPLASM OF RIGHT LUNG: ICD-10-CM

## 2022-09-06 DIAGNOSIS — C78.02 SECONDARY MALIGNANT NEOPLASM OF LEFT LUNG: ICD-10-CM

## 2022-09-06 DIAGNOSIS — Z20.822 CONTACT WITH AND (SUSPECTED) EXPOSURE TO COVID-19: ICD-10-CM

## 2022-09-06 DIAGNOSIS — N40.0 BENIGN PROSTATIC HYPERPLASIA WITHOUT LOWER URINARY TRACT SYMPTOMS: ICD-10-CM

## 2022-09-06 DIAGNOSIS — C78.7 SECONDARY MALIGNANT NEOPLASM OF LIVER AND INTRAHEPATIC BILE DUCT: ICD-10-CM

## 2022-09-06 DIAGNOSIS — D64.9 ANEMIA, UNSPECIFIED: ICD-10-CM

## 2022-09-23 DIAGNOSIS — R56.9 UNSPECIFIED CONVULSIONS: ICD-10-CM

## 2022-09-23 DIAGNOSIS — C18.9 MALIGNANT NEOPLASM OF COLON, UNSPECIFIED: ICD-10-CM

## 2022-09-26 DIAGNOSIS — R56.9 UNSPECIFIED CONVULSIONS: ICD-10-CM

## 2022-09-26 DIAGNOSIS — C18.9 MALIGNANT NEOPLASM OF COLON, UNSPECIFIED: ICD-10-CM

## 2023-02-15 NOTE — DISCHARGE NOTE ADULT - LAUNCH MEDICATION RECONCILIATION
<<-----Click here for Discharge Medication Review
Detail Level: Detailed
Quality 110: Preventive Care And Screening: Influenza Immunization: Influenza Immunization Administered during Influenza season

## 2023-06-14 NOTE — PRE-ANESTHESIA EVALUATION ADULT - MALLAMPATI CLASS
Temp <36 C
Class I (easy) - visualization of the soft palate, fauces, uvula, and both anterior and posterior pillars

## 2023-11-04 NOTE — H&P PST ADULT - ENMT COMMENTS
This was an emergent procedure and consent was implied.
This was an emergent procedure and consent was implied.
no oral erythema or lesions; 4 loose bottom teeth

## 2024-01-11 NOTE — PRE-OP CHECKLIST - BMI (KG/M2)
Type of Form Received:     Form Received (Date) 1/11/24   Form Filled out Yes, faxed 1/30   Placed in provider folder Yes     
27.5

## 2024-03-25 NOTE — DIETITIAN INITIAL EVALUATION ADULT. - NUTRITIONGOAL OUTCOME1
"    Follow-up Patient Progress Note      CC: thyroid    History of Present Illness:   58 yr female with Hypothyroidism diagnosed 2012. She has been stable.    She was initially started on a combination of levothyroxine and Cytomel but she cannot tolerate Cytomel.    Current meds:  Synthroid 100mcg qdaily    Physical Exam:  Body mass index is 21.8 kg/m².  /78 (BP Location: Left arm, Patient Position: Sitting, Cuff Size: Standard)   Pulse 66   Temp 99 °F (37.2 °C) (Tympanic)   Ht 5' 4\" (1.626 m)   Wt 57.6 kg (127 lb)   SpO2 99%   BMI 21.80 kg/m²    Vitals:    03/25/24 1347   Weight: 57.6 kg (127 lb)        Physical Exam  Constitutional:       General: She is not in acute distress.     Appearance: She is well-developed.   HENT:      Head: Normocephalic and atraumatic.      Nose: Nose normal.   Eyes:      Conjunctiva/sclera: Conjunctivae normal.   Pulmonary:      Effort: Pulmonary effort is normal.   Abdominal:      General: There is no distension.   Musculoskeletal:      Cervical back: Normal range of motion and neck supple.   Skin:     Findings: No rash.      Comments: No icterus   Neurological:      Mental Status: She is alert and oriented to person, place, and time.       Labs:   No results found for: \"HGBA1C\"    Lab Results   Component Value Date    EHY1AJDRPQSG 1.433 03/19/2024    TSH 0.47 08/17/2020       Lab Results   Component Value Date    CREATININE 0.70 03/12/2024    CREATININE 0.78 03/06/2023    CREATININE 0.74 02/17/2022    BUN 16 03/12/2024    K 3.6 03/12/2024     03/12/2024    CO2 29 03/12/2024     GFR, Calculated   Date Value Ref Range Status   10/21/2020 88 >60 mL/min/1.73m2 Final     Comment:     mL/min per 1.73 square meters                                            Normal Function or Mild Renal    Disease (if clinically at risk):  >or=60  Moderately Decreased:                30-59  Severely Decreased:                  15-29  Renal Failure:                         <15               "                              -American GFR: multiply reported GFR by 1.16    Please note that the eGFR is based on the CKD-EPI calculation, and is not intended to be used for drug dosing.                                            Note: Calculated GFR may not be an accurate indicator of renal function if the patient's renal function is not in a steady state.     eGFR   Date Value Ref Range Status   03/12/2024 95 ml/min/1.73sq m Final       Lab Results   Component Value Date    ALT 40 03/12/2024    AST 37 03/12/2024    ALKPHOS 62 03/12/2024       Lab Results   Component Value Date    CHOLESTEROL 187 03/12/2024    CHOLESTEROL 154 03/06/2023    CHOLESTEROL 202 (H) 02/17/2022     Lab Results   Component Value Date    HDL 63 03/12/2024    HDL 55 03/06/2023    HDL 85 02/17/2022     Lab Results   Component Value Date    TRIG 79 03/12/2024    TRIG 58 03/06/2023    TRIG 72 02/17/2022     Lab Results   Component Value Date    NONHDLC 124 03/12/2024    NONHDLC 99 03/06/2023    NONHDLC 117 02/17/2022         Assessment/Plan:    1. Acquired hypothyroidism  Assessment & Plan:  She seems to be in great control with most recent TSH and free T4 within normal limits.  She is on Synthroid 100 mcg daily which is roughly 1.6 mcg/kg body weight dosing.  We agreed to not make any changes at this time.  Repeat lipid labs prior to next visit in 6 to 12 months.      2. Palpitations  Assessment & Plan:  Avoid Cytomel in future.  Consider small dose beta-blocker.  Note polypharmacy which can contribute to palpitations..            I have spent a total time of 32 minutes on 03/25/24 in caring for this patient including greater than 50% of this time was spent in counseling/coordination of care as listed above.       Discussed with the patient and all questioned fully answered. She will contact me with concerns.    Margoth Chatman   Pt will consume/tolerate >80% estimated nutrient needs

## 2025-02-04 NOTE — ASU PREOP CHECKLIST - SELECT TESTS ORDERED
Type and Screen/EKG/CBC/BMP
Discontinue Regimen: -Picking
Detail Level: Zone
Plan: -Discussed with patient to follow up with PCP/GI to discuss abnormal triglycerides levels from labs colllected on 12-. Patient states he was not fasting prior to the 12- labs. \\n-Patient warned that Accutane can exacerbate Crohn’s disease and elevate triglyceride levels.\\n-Patient instructed to discuss repeating labs with PCP due to elevated triglyceride levels. \\n-Patient to follow up with  after discussing with PCP/GI and may consider Accutane in future.\\n-Discussed with patient benzoyl peroxide has potential to bleach clothing to take precautionary measures with clothing and towels.
Render In Strict Bullet Format?: No
Continue Regimen: -Adapalene gel apply a thin layer to face nightly.\\n-Clindamycin lotion apply to face every morning and night, patient was informed to not skip any days since it can develop resistance.\\n-BPO wash\\n-clindamycin 1 %-benzoyl peroxide 5 % topical gel Apply a thin layer (0.25 grams) to back and shoulders nightly.
